# Patient Record
Sex: FEMALE | Race: WHITE | NOT HISPANIC OR LATINO | Employment: OTHER | URBAN - METROPOLITAN AREA
[De-identification: names, ages, dates, MRNs, and addresses within clinical notes are randomized per-mention and may not be internally consistent; named-entity substitution may affect disease eponyms.]

---

## 2017-03-24 ENCOUNTER — TELEPHONE (OUTPATIENT)
Dept: FAMILY MEDICINE | Facility: CLINIC | Age: 77
End: 2017-03-24

## 2017-03-24 NOTE — TELEPHONE ENCOUNTER
Panel Management Review      Patient has the following on her problem list: None      Composite cancer screening  Chart review shows that this patient is due/due soon for the following Mammogram  Summary:    Patient is due/failing the following:   MAMMOGRAM    Action needed:   Patient needs referral/order: mammogram    Type of outreach:    Phone, left message for patient to call back.     Questions for provider review:    None                                                                                                                                    Reina Alvarado MA       Chart routed to Care Team .

## 2017-04-03 ENCOUNTER — RADIANT APPOINTMENT (OUTPATIENT)
Dept: MAMMOGRAPHY | Facility: CLINIC | Age: 77
End: 2017-04-03
Attending: FAMILY MEDICINE
Payer: COMMERCIAL

## 2017-04-03 DIAGNOSIS — Z12.31 VISIT FOR SCREENING MAMMOGRAM: ICD-10-CM

## 2017-04-03 PROCEDURE — G0202 SCR MAMMO BI INCL CAD: HCPCS | Mod: TC

## 2017-07-18 ENCOUNTER — OFFICE VISIT (OUTPATIENT)
Dept: FAMILY MEDICINE | Facility: CLINIC | Age: 77
End: 2017-07-18
Payer: COMMERCIAL

## 2017-07-18 VITALS — DIASTOLIC BLOOD PRESSURE: 82 MMHG | HEART RATE: 72 BPM | SYSTOLIC BLOOD PRESSURE: 134 MMHG

## 2017-07-18 DIAGNOSIS — Z23 NEED FOR SHINGLES VACCINE: ICD-10-CM

## 2017-07-18 DIAGNOSIS — K21.9 GASTROESOPHAGEAL REFLUX DISEASE, ESOPHAGITIS PRESENCE NOT SPECIFIED: ICD-10-CM

## 2017-07-18 DIAGNOSIS — D17.30 LIPOMA OF SKIN AND SUBCUTANEOUS TISSUE: Primary | ICD-10-CM

## 2017-07-18 PROCEDURE — 99213 OFFICE O/P EST LOW 20 MIN: CPT | Performed by: FAMILY MEDICINE

## 2017-07-18 NOTE — PATIENT INSTRUCTIONS
Health Maintenance   Topic Date Due     TETANUS IMMUNIZATION (SYSTEM ASSIGNED)  06/19/1960     DEXA SCAN SCREENING (SYSTEM ASSIGNED)  06/19/2007     LIPID SCREEN Q5 YR FEMALE (SYSTEM ASSIGNED)  02/24/2016     FALL RISK ASSESSMENT  12/03/2016     INFLUENZA VACCINE (SYSTEM ASSIGNED)  09/01/2017     MAMMO SCREEN Q2 YR (SYSTEM ASSIGNED)  04/03/2019     ADVANCE DIRECTIVE PLANNING Q5 YRS  08/07/2019     COLON CANCER SCREEN (SYSTEM ASSIGNED)  12/12/2022     PNEUMOCOCCAL  Completed     Thank you for choosing [facility].  You may be receiving a survey in the mail from [employer] regarding your visit today.  Please take a few minutes to complete and return the survey to let us know how we are doing.  Our Clinic hours are:  Mondays    7:20 am - 7 pm  Tues -  Fri  7:20 am - 5 pm  Clinic Phone: [phone]  The clinic lab opens at 7:30 am Mon - Fri and appointments are required.  [facility]. [phone]  Monday-Thursday 8 am - 7pm  Tues/Wed/Fri 8 am - 5:30 pm

## 2017-07-18 NOTE — NURSING NOTE
"Chief Complaint   Patient presents with     Derm Problem     growth on left shoulder       Initial /82 (BP Location: Right arm, Patient Position: Chair, Cuff Size: Adult Regular)  Pulse 72 Estimated body mass index is 28.58 kg/(m^2) as calculated from the following:    Height as of 12/3/15: 5' 3.75\" (1.619 m).    Weight as of 12/12/16: 165 lb 3.2 oz (74.9 kg).  Medication Reconciliation: complete   Radha Franz cMA    "

## 2017-07-18 NOTE — MR AVS SNAPSHOT
After Visit Summary   7/18/2017    Marlene Millan    MRN: [id number]           Patient Information     Date Of Birth          1940        Visit Information        Provider Department      7/18/2017 3:40 PM Ariella Castañeda MD [facility]        Today's Diagnoses     Lipoma of skin and subcutaneous tissue    -  1    Gastroesophageal reflux disease, esophagitis presence not specified          Care Instructions    Health Maintenance   Topic Date Due     TETANUS IMMUNIZATION (SYSTEM ASSIGNED)  06/19/1960     DEXA SCAN SCREENING (SYSTEM ASSIGNED)  06/19/2007     LIPID SCREEN Q5 YR FEMALE (SYSTEM ASSIGNED)  02/24/2016     FALL RISK ASSESSMENT  12/03/2016     INFLUENZA VACCINE (SYSTEM ASSIGNED)  09/01/2017     MAMMO SCREEN Q2 YR (SYSTEM ASSIGNED)  04/03/2019     ADVANCE DIRECTIVE PLANNING Q5 YRS  08/07/2019     COLON CANCER SCREEN (SYSTEM ASSIGNED)  12/12/2022     PNEUMOCOCCAL  Completed     Thank you for choosing [facility].  You may be receiving a survey in the mail from [facility] regarding your visit today.  Please take a few minutes to complete and return the survey to let us know how we are doing.  Our Clinic hours are:  Mondays    7:20 am - 7 pm  Tues -  Fri  7:20 am - 5 pm  Clinic Phone: [phone]  The clinic lab opens at 7:30 am Mon - Fri and appointments are required.  [facility]  Ph. [phone]  Monday-Thursday 8 am - 7pm  Tues/Wed/Fri 8 am - 5:30 pm             Follow-ups after your visit        Who to contact     If you have questions or need follow up information about today's clinic visit or your schedule please contact [facility] directly at [phone].  Normal or non-critical lab and imaging results will be communicated to you by MyChart, letter or phone within 4 business days after the clinic has received the results. If you do not hear from us within 7 days, please contact the clinic through MyChart or phone.  If you have a critical or abnormal lab result, we will notify you by phone as soon as possible.  Submit refill requests through [employer] or call your pharmacy and they will forward the refill request to us. Please allow 3 business days for your refill to be completed.          Additional Information About Your Visit        Nanoleafhart Information     [employer] gives you secure access to your electronic health record. If you see a primary care provider, you can also send messages to your care team and make appointments. If you have questions, please call your primary care clinic.  If you do not have a primary care provider, please call [phone] and they will assist you.        Care EveryWhere ID     This is your Care EveryWhere ID. This could be used by other organizations to access your [place] medical records  [id number]        Your Vitals Were     Pulse                   72            Blood Pressure from Last 3 Encounters:   07/18/17 134/82   12/12/16 133/78   04/15/16 116/66    Weight from Last 3 Encounters:   12/12/16 165 lb 3.2 oz (74.9 kg)   12/03/15 163 lb 9.6 oz (74.2 kg)   09/25/15 169 lb (76.7 kg)              Today, you had the following     No orders found for display         Today's Medication Changes          These changes are accurate as of: 7/18/17  4:53 PM.  If you have any questions, ask your nurse or doctor.               Start taking these medicines.        Dose/Directions    omeprazole 20 MG CR capsule   Commonly known as:  priLOSEC   Used for:  Gastroesophageal reflux disease, esophagitis presence not specified   Started by:  Ariella Castañeda MD        Dose:  20 mg   Take 1 capsule (20 mg) by mouth daily 1/2 hour prior to main meal   Quantity:  30 capsule   Refills:  1            Where to get your medicines      These medications were sent to [facility] [place], [place] - [address]  [address], [facility] [address]     Phone:  [phone]      omeprazole 20 MG CR capsule                Primary Care Provider Office Phone # Fax #    Ariella Dora Castañeda [phone]       [facility] [address] [address] [facility] [address]        Equal Access to Services     JAYLA EMMANUEL : Hadii leigh morgan hadstuo Soomaali, waaxda luqadaha, qaybta kaalmada adeegyada, waxerika idiin hayelidan ademendez light laElizabethjason nicole. So [facility] [phone].    ATENCIÓN: Si habla español, tiene a aldridge disposición servicios gratuitos de asistencia lingüística. Llame al [phone].    We comply with applicable federal civil rights laws and [place] laws. We do not discriminate on the basis of race, color, national origin, age, disability sex, sexual orientation or gender identity.            Thank you!     Thank you for choosing [facility]  for your care. Our goal is always to provide you with excellent care. Hearing back from our patients is one way we can continue to improve our services. Please take a few minutes to complete the written survey that you may receive in the mail after your visit with us. Thank you!             Your Updated Medication List - Protect others around you: Learn how to safely use, store and throw away your medicines at www.disposemymeds.org.          This list is accurate as of: 7/18/17  4:53 PM.  Always use your most recent med list.                   Brand Name Dispense Instructions for use Diagnosis    ibuprofen 800 MG tablet    ADVIL/MOTRIN    30 tablet    Take 1 tablet (800 mg) by mouth every 8 hours as needed for moderate pain    Herpes zoster without complication       MACULAR VITAMIN BENEFIT PO      Take 1 tablet by mouth daily        MULTIPLE VITAMINS PO      Take by mouth daily Chewable - 2 of them each day        omeprazole 20 MG CR capsule    priLOSEC    30 capsule    Take 1 capsule (20 mg) by mouth daily 1/2 hour prior to main meal    Gastroesophageal reflux disease, esophagitis presence not specified        valACYclovir 1000 mg tablet    VALTREX    21 tablet    Take 1 tablet (1,000 mg) by mouth 3 times daily    Herpes zoster without complication

## 2017-07-18 NOTE — PROGRESS NOTES
SUBJECTIVE:                                                    Marlene Millan is a 77 year old female who presents to clinic today for the following health issues:    Chief Complaint   Patient presents with     Derm Problem     growth on left shoulder     Janee is concerned about a soft tissue nodule on the top of her left shoulder in the midclavicular line. This is nontender; she is uncertain how long it has been present, or whether it has been recently enlarging.    She also notes some recurrent esophageal discomfort after eating, relieved by TUMs, but sometimes quite severe. She has been diagnosed with GERD, was on omeprazole and/or ranitidine in the past, but doesn't like to take medicines so weaned herself off the PPI and the H2 blocker. She does elevate the head of her bed, knows she is supposed to avoid bedtime snacks but feels she does better if she has a bit to eat then.    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    OBJECTIVE: /82 (BP Location: Right arm, Patient Position: Chair, Cuff Size: Adult Regular)  Pulse 72    She has a soft, compressible mass about 4-5 cm in diameter on the top of her left shoulder with indistinct borders, consistent with a benign lipoma.    ASSESSMENT:/PLAN:      ICD-10-CM    1. Lipoma of skin and subcutaneous tissue D17.30    2. Gastroesophageal reflux disease, esophagitis presence not specified K21.9 omeprazole (PRILOSEC) 20 MG CR capsule   3. Need for shingles vaccine Z23 ZOSTER VACC LIVE SUBQ NJX     She will check on insurance coverage for the shingles vaccine; a future order was placed in case she decides to get that here.  She is advised to resume the omeprazole for the next month or two.  If her symptoms resolve, then she may try going back to ranitidine, which would be a safer option for long term use.  But if her symptoms persist despite the PPI, then I would recommend an  EGD.    The lipoma is benign and of cosmetic significance only.  IF and when it bothers her enough to want it removed, she may request a referral to a general surgeon.    Ariella Castañeda md

## 2017-11-16 ENCOUNTER — ALLIED HEALTH/NURSE VISIT (OUTPATIENT)
Dept: FAMILY MEDICINE | Facility: CLINIC | Age: 77
End: 2017-11-16
Payer: COMMERCIAL

## 2017-11-16 DIAGNOSIS — Z23 NEED FOR PROPHYLACTIC VACCINATION AND INOCULATION AGAINST INFLUENZA: Primary | ICD-10-CM

## 2017-11-16 PROCEDURE — 99207 ZZC NO CHARGE NURSE ONLY: CPT

## 2017-11-16 PROCEDURE — 90662 IIV NO PRSV INCREASED AG IM: CPT

## 2017-11-16 PROCEDURE — 90471 IMMUNIZATION ADMIN: CPT

## 2017-11-16 NOTE — PROGRESS NOTES
Injectable Influenza Immunization Documentation    1.  Is the person to be vaccinated sick today?   No    2. Does the person to be vaccinated have an allergy to a component   of the vaccine?   No  Egg Allergy Algorithm Link    3. Has the person to be vaccinated ever had a serious reaction   to influenza vaccine in the past?   No    4. Has the person to be vaccinated ever had Guillain-Barré syndrome?   No    Form completed by Radha Franz CMA  Per orders of Dr. Leos, injection of flu vaccine given by Radha Franz CMA. Patient instructed to remain in clinic for 15 minutes afterwards, and to report any adverse reaction to me immediately.

## 2017-11-16 NOTE — MR AVS SNAPSHOT
After Visit Summary   11/16/2017    Marlene Millan    MRN: [id number]           Patient Information     Date Of Birth          1940        Visit Information        Provider Department      11/16/2017 2:15 PM Cma/Lpn, Fl Cl [facility]        Today's Diagnoses     Need for prophylactic vaccination and inoculation against influenza    -  1       Follow-ups after your visit        Your next 10 appointments already scheduled     Nov 16, 2017  2:15 PM CST   Nurse Only with Fl Cl Cma/Lpn   [facility] ([facility]    [address]  [facility] [address]   [phone]              Who to contact     If you have questions or need follow up information about today's clinic visit or your schedule please contact [facility] directly at [phone].  Normal or non-critical lab and imaging results will be communicated to you by Videolicioushart, letter or phone within 4 business days after the clinic has received the results. If you do not hear from us within 7 days, please contact the clinic through Videolicioushart or phone. If you have a critical or abnormal lab result, we will notify you by phone as soon as possible.  Submit refill requests through [employer] or call your pharmacy and they will forward the refill request to us. Please allow 3 business days for your refill to be completed.          Additional Information About Your Visit        MyChart Information     [employer] gives you secure access to your electronic health record. If you see a primary care provider, you can also send messages to your care team and make appointments. If you have questions, please call your primary care clinic.  If you do not have a primary care provider, please call [phone] and they will assist you.        Care EveryWhere ID     This is your Care EveryWhere ID. This could be used by other organizations to access your [facility]  records  [id number]         Blood Pressure from Last 3 Encounters:   07/18/17 134/82   12/12/16 133/78   04/15/16 116/66    Weight from Last 3 Encounters:   12/12/16 165 lb 3.2 oz (74.9 kg)   12/03/15 163 lb 9.6 oz (74.2 kg)   09/25/15 169 lb (76.7 kg)              We Performed the Following     FLU VACCINE, INCREASED ANTIGEN, PRESV FREE, AGE 65+ [65926]     Vaccine Administration, Initial [06250]        Primary Care Provider Office Phone # Fax #    Ariella Dora Castañeda [phone] [address] [facility] [address]        Equal Access to Services     JAYLA BENITEZ : Hadii leigh del rosarioo Soalbertina, waaxda luqadaha, qaybta kaalmada adeegyada, loni nicole. So [facility] [phone].    ATENCIÓN: Si habla español, tiene a aldridge disposición servicios gratuitos de asistencia lingüística. Llame al [phone].    We comply with applicable federal civil rights laws and [place] laws. We do not discriminate on the basis of race, color, national origin, age, disability, sex, sexual orientation, or gender identity.            Thank you!     Thank you for choosing [facility]  for your care. Our goal is always to provide you with excellent care. Hearing back from our patients is one way we can continue to improve our services. Please take a few minutes to complete the written survey that you may receive in the mail after your visit with us. Thank you!             Your Updated Medication List - Protect others around you: Learn how to safely use, store and throw away your medicines at www.disposemymeds.org.          This list is accurate as of: 11/16/17  2:06 PM.  Always use your most recent med list.                   Brand Name Dispense Instructions for use Diagnosis    ibuprofen 800 MG tablet    ADVIL/MOTRIN    30 tablet    Take 1 tablet (800 mg) by mouth every 8 hours as needed for moderate pain    Herpes zoster without complication       MACULAR VITAMIN  BENEFIT PO      Take 1 tablet by mouth daily        MULTIPLE VITAMINS PO      Take by mouth daily Chewable - 2 of them each day        omeprazole 20 MG CR capsule    priLOSEC    30 capsule    Take 1 capsule (20 mg) by mouth daily 1/2 hour prior to main meal    Gastroesophageal reflux disease, esophagitis presence not specified       valACYclovir 1000 mg tablet    VALTREX    21 tablet    Take 1 tablet (1,000 mg) by mouth 3 times daily    Herpes zoster without complication

## 2017-11-30 ENCOUNTER — TRANSFERRED RECORDS (OUTPATIENT)
Dept: HEALTH INFORMATION MANAGEMENT | Facility: CLINIC | Age: 77
End: 2017-11-30

## 2017-12-13 ENCOUNTER — OFFICE VISIT (OUTPATIENT)
Dept: FAMILY MEDICINE | Facility: CLINIC | Age: 77
End: 2017-12-13
Payer: COMMERCIAL

## 2017-12-13 VITALS
BODY MASS INDEX: 28.31 KG/M2 | DIASTOLIC BLOOD PRESSURE: 84 MMHG | TEMPERATURE: 98 F | SYSTOLIC BLOOD PRESSURE: 155 MMHG | HEIGHT: 64 IN | HEART RATE: 60 BPM | WEIGHT: 165.8 LBS

## 2017-12-13 DIAGNOSIS — Z23 NEED FOR VACCINATION: ICD-10-CM

## 2017-12-13 DIAGNOSIS — E78.5 HYPERLIPIDEMIA LDL GOAL <130: ICD-10-CM

## 2017-12-13 DIAGNOSIS — Z01.818 PREOP GENERAL PHYSICAL EXAM: Primary | ICD-10-CM

## 2017-12-13 DIAGNOSIS — H25.9 SENILE CATARACT OF RIGHT EYE, UNSPECIFIED AGE-RELATED CATARACT TYPE: ICD-10-CM

## 2017-12-13 LAB
CHOLEST SERPL-MCNC: 242 MG/DL
HDLC SERPL-MCNC: 54 MG/DL
LDLC SERPL CALC-MCNC: 156 MG/DL
NONHDLC SERPL-MCNC: 188 MG/DL
TRIGL SERPL-MCNC: 161 MG/DL

## 2017-12-13 PROCEDURE — 99214 OFFICE O/P EST MOD 30 MIN: CPT | Mod: 25 | Performed by: FAMILY MEDICINE

## 2017-12-13 PROCEDURE — 36415 COLL VENOUS BLD VENIPUNCTURE: CPT | Performed by: FAMILY MEDICINE

## 2017-12-13 PROCEDURE — 80061 LIPID PANEL: CPT | Performed by: FAMILY MEDICINE

## 2017-12-13 PROCEDURE — 90715 TDAP VACCINE 7 YRS/> IM: CPT | Performed by: FAMILY MEDICINE

## 2017-12-13 PROCEDURE — 90471 IMMUNIZATION ADMIN: CPT | Performed by: FAMILY MEDICINE

## 2017-12-13 ASSESSMENT — PAIN SCALES - GENERAL: PAINLEVEL: NO PAIN (0)

## 2017-12-13 NOTE — PROGRESS NOTES
[facility]  [address]  [facility] [address]  [phone]  Dept: [phone]    PRE-OP EVALUATION:  Today's date: 2017    Marlene Millan (: 1940) presents for pre-operative evaluation assessment as requested by Dr. Michel.  She requires evaluation and anesthesia risk assessment prior to undergoing surgery/procedure for treatment of cataract .  Proposed procedure: Right Cataract Removal with Implant    Date of Surgery/ Procedure: 17  Time of Surgery/ Procedure: ?  Hospital/Surgical Facility: [facility]    Primary Physician: Ariella Castañeda  Type of Anesthesia Anticipated: Combined MAC with Retrobulbar    Patient has a Health Care Directive or Living Will:  NO    1. NO - Do you have a history of heart attack, stroke, stent, bypass or surgery on an artery in the head, neck, heart or legs?  2. NO - Do you ever have any pain or discomfort in your chest?  3. NO - Do you have a history of  Heart Failure?  4. NO - Are you troubled by shortness of breath when: walking on the level, up a slight hill or at night?  5. NO - Do you currently have a cold, bronchitis or other respiratory infection?  6. NO - Do you have a cough, shortness of breath or wheezing?  7. NO - Do you sometimes get pains in the calves of your legs when you walk?  8. YES - Do you or anyone in your family have previous history of blood clots? Personal hx of blood clots  9. NO - Do you or does anyone in your family have a serious bleeding problem such as prolonged bleeding following surgeries or cuts?  10. NO - Have you ever had problems with anemia or been told to take iron pills?  11. NO - Have you had any abnormal blood loss such as black, tarry or bloody stools, or abnormal vaginal bleeding?  12. NO - Have you ever had a blood transfusion?  13. YES - Have you or any of your relatives ever had problems with anesthesia?  14. NO - Do you have sleep apnea, excessive snoring or  daytime drowsiness?  15. NO - Do you have any prosthetic heart valves?  16. YES - Do you have prosthetic joints?  Bilateral knee replacements  17. NO - Is there any chance that you may be pregnant?        HPI:                                                      Brief HPI related to upcoming procedure: Symptomatic cataracts.        See problem list for active medical problems.  Problems all longstanding and stable, except as noted/documented.  See ROS for pertinent symptoms related to these conditions.                                                                                                  .  HYPERLIPIDEMIA - Patient has a long history of significant Hyperlipidemia requiring medication for treatment with recent unable to assess control. Has no checked lipids since 2011.                                   .    MEDICAL HISTORY:                                                    Patient Active Problem List    Diagnosis Date Noted     Health Care Home 12/12/2012     Priority: Medium     Jazmin Morgan RN-PHN  FPA / ASH[facility]   [phone]    DX V65.8 REPLACED WITH 77454 HEALTH CARE HOME (04/08/2013)       Advanced directives, counseling/discussion 06/11/2012     Priority: Medium     Received 2007         Patent foramen ovale 02/27/2012     Priority: Medium     Incidental finding on transesophageal echocardiogram       GERD (gastroesophageal reflux disease) 02/25/2011     Priority: Medium     Controlled by diet       HYPERLIPIDEMIA LDL GOAL <130 10/31/2010     Priority: Medium     Macular degeneration 07/14/2008     Priority: Medium     Left eye       Spinal Stenosis of Lumbar Region 07/14/2008     Priority: Medium      Past Medical History:   Diagnosis Date     Hypertension 6/7/2010     Macular degeneration      PONV (postoperative nausea and vomiting)      Pulmonary embolism (H)      Pulmonary embolism, bilateral (H) 2/19/2012    Post-surgical at [facility] following total knee  replacement      Shingles outbreak December 2016     Past Surgical History:   Procedure Laterality Date     basal cell cancer of nose  Oct 2012     BREAST SURGERY      breast reduction     CARPAL TUNNEL RELEASE RT/LT      right     COMBINED REPAIR PTOSIS WITH BLEPHAROPLASTY BILATERAL Bilateral 6/30/2015    Procedure: COMBINED REPAIR PTOSIS WITH BLEPHAROPLASTY BILATERAL;  Surgeon: Ilir Marvin MD;  Location:  [facility]     HC REMOVAL GALLBLADDER       LIGATE VEIN VARICOSE, PHLEBECTOMY MULTIPLE STAB, COMBINED Bilateral 8/13/2015    Procedure: COMBINED LIGATE VEIN VARICOSE, PHLEBECTOMY MULTIPLE STAB;  Surgeon: Alphonse Pro MD;  Location: [facility] OR     ORTHOPEDIC SURGERY      bilateral knee     SURGICAL HISTORY OF -       breast reduction mammoplasty 1990s     SURGICAL HISTORY OF -       bilateral lower extremity vein stripping     SURGICAL HISTORY OF -   2/22/2010    left total knee arthroplasty     Current Outpatient Prescriptions   Medication Sig Dispense Refill     ibuprofen (ADVIL/MOTRIN) 800 MG tablet Take 1 tablet (800 mg) by mouth every 8 hours as needed for moderate pain 30 tablet 1     Multiple Vitamins-Minerals (MACULAR VITAMIN BENEFIT PO) Take 1 tablet by mouth daily       MULTIPLE VITAMINS PO Take by mouth daily Chewable - 2 of them each day       omeprazole (PRILOSEC) 20 MG CR capsule Take 1 capsule (20 mg) by mouth daily 1/2 hour prior to main meal (Patient not taking: Reported on 12/13/2017) 30 capsule 1     valACYclovir (VALTREX) 1000 mg tablet Take 1 tablet (1,000 mg) by mouth 3 times daily (Patient not taking: Reported on 12/13/2017) 21 tablet 0     OTC products: None, except as noted above    Allergies   Allergen Reactions     Sulfa Drugs      Affected eyesight      Latex Allergy: NO    Social History   Substance Use Topics     Smoking status: Former Smoker     Years: 20.00     Types: Cigarettes     Quit date: 2/13/1982     Smokeless tobacco: Never Used     Alcohol use Yes      Comment: occ  "wine     History   Drug Use No       REVIEW OF SYSTEMS:                                                    Constitutional, neuro, ENT, endocrine, pulmonary, cardiac, gastrointestinal, genitourinary, musculoskeletal, integument and psychiatric systems are negative, except as otherwise noted.      EXAM:                                                    /84 (BP Location: Right arm, Cuff Size: Adult Regular)  Pulse 60  Temp 98  F (36.7  C) (Oral)  Ht 5' 3.5\" (1.613 m)  Wt 165 lb 12.8 oz (75.2 kg)  Breastfeeding? No  BMI 28.91 kg/m2    GENERAL APPEARANCE: healthy, alert and no distress     EYES: EOMI, PERRL     HENT: ear canals and TM's normal and nose and mouth without ulcers or lesions     NECK: no adenopathy, no asymmetry, masses, or scars and thyroid normal to palpation     RESP: lungs clear to auscultation - no rales, rhonchi or wheezes     CV: regular rates and rhythm, normal S1 S2, no S3 or S4 and no murmur, click or rub     ABDOMEN:  soft, nontender, no HSM or masses and bowel sounds normal     MS: extremities normal- no gross deformities noted, no evidence of inflammation in joints, FROM in all extremities.     SKIN: no suspicious lesions or rashes     NEURO: Normal strength and tone, sensory exam grossly normal, mentation intact and speech normal     PSYCH: mentation appears normal. and affect normal/bright     LYMPHATICS: No axillary, cervical, or supraclavicular nodes    DIAGNOSTICS:                                                    No labs or EKG required for low risk surgery (cataract, skin procedure, breast biopsy, etc)    Recent Labs   Lab Test  08/06/15   1706 07/13/12 06/15/12   02/08/12   1028   HGB  12.4   --    --    --   13.0   PLT  254   --    --    --   259   INR   --   1.6*  1.6*   < >  0.96   NA  138   --    --    --   141   POTASSIUM  4.1   --    --    --   4.3   CR  0.80   --    --    --   0.79    < > = values in this interval not displayed.        IMPRESSION:                     "                                Reason for surgery/procedure: cataract  Diagnosis/reason for consult: Preoperative H&P     The proposed surgical procedure is considered LOW risk.    REVISED CARDIAC RISK INDEX  The patient has the following serious cardiovascular risks for perioperative complications such as (MI, PE, VFib and 3  AV Block):  No serious cardiac risks  INTERPRETATION: 0 risks: Class I (very low risk - 0.4% complication rate)    The patient has the following additional risks for perioperative complications:  No identified additional risks      ICD-10-CM    1. Preop general physical exam Z01.818    2. Senile cataract of right eye, unspecified age-related cataract type H25.9    3. Hyperlipidemia LDL goal <130 E78.5 Lipid Profile (Chol, Trig, HDL, LDL calc)   4. Need for vaccination Z23 TDAP VACCINE (ADACEL) [74168.002]     1st  Administration  [73534]       RECOMMENDATIONS:                                                          --Patient is to take all scheduled medications on the day of surgery EXCEPT for modifications listed below.    APPROVAL GIVEN to proceed with proposed procedure, without further diagnostic evaluation       Signed Electronically by: Katy Rodriguez MD    Copy of this evaluation report is provided to requesting physician.    [facility] Preop Guidelines

## 2017-12-13 NOTE — MR AVS SNAPSHOT
After Visit Summary   12/13/2017    Marlene Millan    MRN: [id number]           Patient Information     Date Of Birth          1940        Visit Information        Provider Department      12/13/2017 8:00 AM Katy Rodriguez MD [facility]        Today's Diagnoses     Preop general physical exam    -  1    Senile cataract of right eye, unspecified age-related cataract type        Hyperlipidemia LDL goal <130        Need for vaccination          Care Instructions      Before Your Surgery      Call your surgeon if there is any change in your health. This includes signs of a cold or flu (such as a sore throat, runny nose, cough, rash or fever).    Do not smoke, drink alcohol or take over the counter medicine (unless your surgeon or primary care doctor tells you to) for the 24 hours before and after surgery.    If you take prescribed drugs: Follow your doctor s orders about which medicines to take and which to stop until after surgery.    Eating and drinking prior to surgery: follow the instructions from your surgeon    Take a shower or bath the night before surgery. Use the soap your surgeon gave you to gently clean your skin. If you do not have soap from your surgeon, use your regular soap. Do not shave or scrub the surgery site.  Wear clean pajamas and have clean sheets on your bed.           Follow-ups after your visit        Your next 10 appointments already scheduled     Dec 21, 2017   Procedure with Clif Michel MD   [facility] (--)    [address] [address] [phone]           The medical center is located at [address]. (between [address] in [place], [place] miles north of [place]).              Who to contact     If you have questions or need follow up information about today's clinic visit or your schedule please contact [facility] directly at [phone].  Normal or  "non-critical lab and imaging results will be communicated to you by MyChart, letter or phone within 4 business days after the clinic has received the results. If you do not hear from us within 7 days, please contact the clinic through [facility] or phone. If you have a critical or abnormal lab result, we will notify you by phone as soon as possible.  Submit refill requests through [employer] or call your pharmacy and they will forward the refill request to us. Please allow 3 business days for your refill to be completed.          Additional Information About Your Visit        Tre[employer]of[employer] Hit Information     Us[employer] gives you secure access to your electronic health record. If you see a primary care provider, you can also send messages to your care team and make appointments. If you have questions, please call your primary care clinic.  If you do not have a primary care provider, please call [phone] and they will assist you.        Care EveryWhere ID     This is your Care EveryWhere ID. This could be used by other organizations to access your [facility] medical records  [id number]        Your Vitals Were     Pulse Temperature Height Breastfeeding? BMI (Body Mass Index)       60 98  F (36.7  C) (Oral) 5' 3.5\" (1.613 m) No 28.91 kg/m2        Blood Pressure from Last 3 Encounters:   12/13/17 155/84   07/18/17 134/82   12/12/16 133/78    Weight from Last 3 Encounters:   12/13/17 165 lb 12.8 oz (75.2 kg)   12/12/16 165 lb 3.2 oz (74.9 kg)   12/03/15 163 lb 9.6 oz (74.2 kg)              We Performed the Following     1st  Administration  [45861]     Lipid Profile (Chol, Trig, HDL, LDL calc)     TDAP VACCINE (ADACEL) [84089.002]        Primary Care Provider Office Phone # Fax #    Ariella Dora Castañeda [phone] [address] [facility] [address]        Equal Access to Services     JAYLA BENITEZ AH: Hadii leigh morgan hadasho Soomaali, waaxda luqadaha, qaybta kaalmaaleisha caban, loni light " laallie nicole. So [facility] [phone].    ATENCIÓN: Si habla daniela, tiene a aldridge disposición servicios gratuitos de asistencia lingüística. Radha segundo [phone].    We comply with applicable federal civil rights laws and [place] laws. We do not discriminate on the basis of race, color, national origin, age, disability, sex, sexual orientation, or gender identity.            Thank you!     Thank you for choosing [facility]  for your care. Our goal is always to provide you with excellent care. Hearing back from our patients is one way we can continue to improve our services. Please take a few minutes to complete the written survey that you may receive in the mail after your visit with us. Thank you!             Your Updated Medication List - Protect others around you: Learn how to safely use, store and throw away your medicines at www.disposemymeds.org.          This list is accurate as of: 12/13/17  9:25 AM.  Always use your most recent med list.                   Brand Name Dispense Instructions for use Diagnosis    ibuprofen 800 MG tablet    ADVIL/MOTRIN    30 tablet    Take 1 tablet (800 mg) by mouth every 8 hours as needed for moderate pain    Herpes zoster without complication       MACULAR VITAMIN BENEFIT PO      Take 1 tablet by mouth daily        MULTIPLE VITAMINS PO      Take by mouth daily Chewable - 2 of them each day        omeprazole 20 MG CR capsule    priLOSEC    30 capsule    Take 1 capsule (20 mg) by mouth daily 1/2 hour prior to main meal    Gastroesophageal reflux disease, esophagitis presence not specified       valACYclovir 1000 mg tablet    VALTREX    21 tablet    Take 1 tablet (1,000 mg) by mouth 3 times daily    Herpes zoster without complication

## 2017-12-13 NOTE — NURSING NOTE
"Chief Complaint   Patient presents with     Pre-Op Exam     Imm/Inj     adacel       Initial /84 (BP Location: Right arm, Cuff Size: Adult Regular)  Pulse 60  Temp 98  F (36.7  C) (Oral)  Ht 5' 3.5\" (1.613 m)  Wt 165 lb 12.8 oz (75.2 kg)  Breastfeeding? No  BMI 28.91 kg/m2 Estimated body mass index is 28.91 kg/(m^2) as calculated from the following:    Height as of this encounter: 5' 3.5\" (1.613 m).    Weight as of this encounter: 165 lb 12.8 oz (75.2 kg).  Medication Reconciliation: complete    "

## 2017-12-14 DIAGNOSIS — K21.9 GASTROESOPHAGEAL REFLUX DISEASE, ESOPHAGITIS PRESENCE NOT SPECIFIED: ICD-10-CM

## 2017-12-14 DIAGNOSIS — E78.5 HYPERLIPIDEMIA LDL GOAL <130: Primary | ICD-10-CM

## 2017-12-14 PROBLEM — Q21.10 ASD (ATRIAL SEPTAL DEFECT): Status: ACTIVE | Noted: 2017-12-14

## 2017-12-14 RX ORDER — ATORVASTATIN CALCIUM 40 MG/1
40 TABLET, FILM COATED ORAL DAILY
Qty: 90 TABLET | Refills: 3 | Status: SHIPPED | OUTPATIENT
Start: 2017-12-14 | End: 2018-12-27

## 2017-12-14 NOTE — PROGRESS NOTES
Notified via MyChart: The 10-year ASCVD risk score (Claudine SCHMIDT Jr, et al., 2013) is: 22.8%    Values used to calculate the score:      Age: 77 years      Sex: Female      Is Non- : No      Diabetic: No      Tobacco smoker: No      Systolic Blood Pressure: 155 mmHg      Is BP treated: No      HDL Cholesterol: 54 mg/dL      Total Cholesterol: 242 mg/dL

## 2017-12-19 ENCOUNTER — ANESTHESIA EVENT (OUTPATIENT)
Dept: SURGERY | Facility: CLINIC | Age: 77
End: 2017-12-19
Payer: COMMERCIAL

## 2017-12-19 NOTE — ANESTHESIA PREPROCEDURE EVALUATION
Anesthesia Evaluation     . Pt has had prior anesthetic.     History of anesthetic complications   - PONV  severe PONV       ROS/MED HX    ENT/Pulmonary:     (+), recent URI unresolved nasal drainage : . Other pulmonary disease PE.    Neurologic:  - neg neurologic ROS     Cardiovascular: Comment: PFO    (+) Dyslipidemia, hypertension----. : . . . :. . Atrial Septal defect:,       METS/Exercise Tolerance:  >4 METS   Hematologic:  - neg hematologic  ROS       Musculoskeletal:   (+) , , other musculoskeletal- Spinal stenosis      GI/Hepatic:     (+) GERD Asymptomatic on medication,       Renal/Genitourinary:  - ROS Renal section negative       Endo:  - neg endo ROS       Psychiatric:  - neg psychiatric ROS       Infectious Disease:  - neg infectious disease ROS       Malignancy:      - no malignancy   Other:    - neg other ROS                 Physical Exam  Normal systems: cardiovascular and pulmonary    Airway   Mallampati: II  TM distance: >3 FB  Neck ROM: full    Dental   (+) missing    Cardiovascular       Pulmonary                     Anesthesia Plan      History & Physical Review  History and physical reviewed and following examination; no interval change.    ASA Status:  2 .    NPO Status:  > 8 hours    Plan for MAC with Propofol induction.   PONV prophylaxis:  Ondansetron (or other 5HT-3) and Dexamethasone or Solumedrol       Postoperative Care  Postoperative pain management:  IV analgesics and Oral pain medications.      Consents  Anesthetic plan, risks, benefits and alternatives discussed with:  Patient and Daughter/Son..                          .

## 2017-12-21 ENCOUNTER — HOSPITAL ENCOUNTER (OUTPATIENT)
Facility: CLINIC | Age: 77
Discharge: HOME | End: 2017-12-21
Attending: OPHTHALMOLOGY | Admitting: OPHTHALMOLOGY
Payer: COMMERCIAL

## 2017-12-21 ENCOUNTER — ANESTHESIA (OUTPATIENT)
Dept: SURGERY | Facility: CLINIC | Age: 77
End: 2017-12-21
Payer: COMMERCIAL

## 2017-12-21 ENCOUNTER — SURGERY (OUTPATIENT)
Age: 77
End: 2017-12-21

## 2017-12-21 VITALS
TEMPERATURE: 97.8 F | HEART RATE: 68 BPM | HEIGHT: 64 IN | OXYGEN SATURATION: 98 % | RESPIRATION RATE: 16 BRPM | DIASTOLIC BLOOD PRESSURE: 85 MMHG | BODY MASS INDEX: 27.83 KG/M2 | WEIGHT: 163 LBS | SYSTOLIC BLOOD PRESSURE: 153 MMHG

## 2017-12-21 PROCEDURE — 36000025 ZZH CATARACT SURGICAL PACKAGE: Performed by: OPHTHALMOLOGY

## 2017-12-21 PROCEDURE — 71000022 ZZH RECOVERY CATRACT PACKAGE: Performed by: OPHTHALMOLOGY

## 2017-12-21 PROCEDURE — 25000125 ZZHC RX 250: Performed by: NURSE ANESTHETIST, CERTIFIED REGISTERED

## 2017-12-21 PROCEDURE — 37000012 ZZH ANESTHESIA CATARACT PACKAGE: Performed by: OPHTHALMOLOGY

## 2017-12-21 PROCEDURE — 25000128 H RX IP 250 OP 636: Performed by: NURSE ANESTHETIST, CERTIFIED REGISTERED

## 2017-12-21 PROCEDURE — 25000132 ZZH RX MED GY IP 250 OP 250 PS 637: Performed by: OPHTHALMOLOGY

## 2017-12-21 PROCEDURE — 25000125 ZZHC RX 250: Performed by: OPHTHALMOLOGY

## 2017-12-21 PROCEDURE — V2632 POST CHMBR INTRAOCULAR LENS: HCPCS | Performed by: OPHTHALMOLOGY

## 2017-12-21 PROCEDURE — 25000128 H RX IP 250 OP 636: Performed by: OPHTHALMOLOGY

## 2017-12-21 DEVICE — IMPLANTABLE DEVICE: Type: IMPLANTABLE DEVICE | Site: EYE | Status: FUNCTIONAL

## 2017-12-21 RX ORDER — ONDANSETRON 4 MG/1
4 TABLET, ORALLY DISINTEGRATING ORAL EVERY 30 MIN PRN
Status: DISCONTINUED | OUTPATIENT
Start: 2017-12-21 | End: 2017-12-21 | Stop reason: HOSPADM

## 2017-12-21 RX ORDER — SODIUM CHLORIDE, SODIUM LACTATE, POTASSIUM CHLORIDE, CALCIUM CHLORIDE 600; 310; 30; 20 MG/100ML; MG/100ML; MG/100ML; MG/100ML
INJECTION, SOLUTION INTRAVENOUS CONTINUOUS
Status: DISCONTINUED | OUTPATIENT
Start: 2017-12-21 | End: 2017-12-21 | Stop reason: HOSPADM

## 2017-12-21 RX ORDER — PHENYLEPHRINE HYDROCHLORIDE 100 MG/ML
SOLUTION/ DROPS OPHTHALMIC PRN
Status: DISCONTINUED | OUTPATIENT
Start: 2017-12-21 | End: 2017-12-21 | Stop reason: HOSPADM

## 2017-12-21 RX ORDER — FLURBIPROFEN SODIUM 0.3 MG/ML
1 SOLUTION/ DROPS OPHTHALMIC
Status: COMPLETED | OUTPATIENT
Start: 2017-12-21 | End: 2017-12-21

## 2017-12-21 RX ORDER — MEPERIDINE HYDROCHLORIDE 25 MG/ML
12.5 INJECTION INTRAMUSCULAR; INTRAVENOUS; SUBCUTANEOUS
Status: DISCONTINUED | OUTPATIENT
Start: 2017-12-21 | End: 2017-12-21 | Stop reason: HOSPADM

## 2017-12-21 RX ORDER — FENTANYL CITRATE 50 UG/ML
25-50 INJECTION, SOLUTION INTRAMUSCULAR; INTRAVENOUS
Status: DISCONTINUED | OUTPATIENT
Start: 2017-12-21 | End: 2017-12-21 | Stop reason: HOSPADM

## 2017-12-21 RX ORDER — ONDANSETRON 2 MG/ML
4 INJECTION INTRAMUSCULAR; INTRAVENOUS EVERY 30 MIN PRN
Status: DISCONTINUED | OUTPATIENT
Start: 2017-12-21 | End: 2017-12-21 | Stop reason: HOSPADM

## 2017-12-21 RX ORDER — DEXAMETHASONE SODIUM PHOSPHATE 4 MG/ML
4 INJECTION, SOLUTION INTRA-ARTICULAR; INTRALESIONAL; INTRAMUSCULAR; INTRAVENOUS; SOFT TISSUE EVERY 10 MIN PRN
Status: DISCONTINUED | OUTPATIENT
Start: 2017-12-21 | End: 2017-12-21 | Stop reason: HOSPADM

## 2017-12-21 RX ORDER — NALOXONE HYDROCHLORIDE 0.4 MG/ML
.1-.4 INJECTION, SOLUTION INTRAMUSCULAR; INTRAVENOUS; SUBCUTANEOUS
Status: DISCONTINUED | OUTPATIENT
Start: 2017-12-21 | End: 2017-12-21 | Stop reason: HOSPADM

## 2017-12-21 RX ORDER — NEOMYCIN SULFATE, POLYMYXIN B SULFATE, AND DEXAMETHASONE 3.5; 10000; 1 MG/G; [USP'U]/G; MG/G
OINTMENT OPHTHALMIC PRN
Status: DISCONTINUED | OUTPATIENT
Start: 2017-12-21 | End: 2017-12-21 | Stop reason: HOSPADM

## 2017-12-21 RX ORDER — PROPOFOL 10 MG/ML
INJECTION, EMULSION INTRAVENOUS PRN
Status: DISCONTINUED | OUTPATIENT
Start: 2017-12-21 | End: 2017-12-21

## 2017-12-21 RX ORDER — HYDROXYZINE HYDROCHLORIDE 25 MG/1
25 TABLET, FILM COATED ORAL EVERY 6 HOURS PRN
Status: DISCONTINUED | OUTPATIENT
Start: 2017-12-21 | End: 2017-12-21 | Stop reason: HOSPADM

## 2017-12-21 RX ORDER — ONDANSETRON 2 MG/ML
INJECTION INTRAMUSCULAR; INTRAVENOUS PRN
Status: DISCONTINUED | OUTPATIENT
Start: 2017-12-21 | End: 2017-12-21

## 2017-12-21 RX ORDER — LIDOCAINE HYDROCHLORIDE 10 MG/ML
INJECTION, SOLUTION EPIDURAL; INFILTRATION; INTRACAUDAL; PERINEURAL PRN
Status: DISCONTINUED | OUTPATIENT
Start: 2017-12-21 | End: 2017-12-21

## 2017-12-21 RX ORDER — TIMOLOL MALEATE 5 MG/ML
SOLUTION/ DROPS OPHTHALMIC PRN
Status: DISCONTINUED | OUTPATIENT
Start: 2017-12-21 | End: 2017-12-21 | Stop reason: HOSPADM

## 2017-12-21 RX ORDER — SCOLOPAMINE TRANSDERMAL SYSTEM 1 MG/1
1 PATCH, EXTENDED RELEASE TRANSDERMAL
Status: DISCONTINUED | OUTPATIENT
Start: 2017-12-21 | End: 2017-12-21 | Stop reason: HOSPADM

## 2017-12-21 RX ORDER — LIDOCAINE 40 MG/G
CREAM TOPICAL
Status: DISCONTINUED | OUTPATIENT
Start: 2017-12-21 | End: 2017-12-21 | Stop reason: HOSPADM

## 2017-12-21 RX ORDER — METOPROLOL TARTRATE 1 MG/ML
1-2 INJECTION, SOLUTION INTRAVENOUS EVERY 5 MIN PRN
Status: DISCONTINUED | OUTPATIENT
Start: 2017-12-21 | End: 2017-12-21 | Stop reason: HOSPADM

## 2017-12-21 RX ORDER — DEXAMETHASONE SODIUM PHOSPHATE 4 MG/ML
INJECTION, SOLUTION INTRA-ARTICULAR; INTRALESIONAL; INTRAMUSCULAR; INTRAVENOUS; SOFT TISSUE PRN
Status: DISCONTINUED | OUTPATIENT
Start: 2017-12-21 | End: 2017-12-21

## 2017-12-21 RX ORDER — IBUPROFEN 400 MG/1
400 TABLET, FILM COATED ORAL ONCE
Status: COMPLETED | OUTPATIENT
Start: 2017-12-21 | End: 2017-12-21

## 2017-12-21 RX ORDER — ALBUTEROL SULFATE 0.83 MG/ML
2.5 SOLUTION RESPIRATORY (INHALATION) EVERY 4 HOURS PRN
Status: DISCONTINUED | OUTPATIENT
Start: 2017-12-21 | End: 2017-12-21 | Stop reason: HOSPADM

## 2017-12-21 RX ORDER — PROPARACAINE HYDROCHLORIDE 5 MG/ML
SOLUTION/ DROPS OPHTHALMIC PRN
Status: DISCONTINUED | OUTPATIENT
Start: 2017-12-21 | End: 2017-12-21 | Stop reason: HOSPADM

## 2017-12-21 RX ADMIN — FLURBIPROFEN SODIUM 1 DROP: 0.3 SOLUTION/ DROPS OPHTHALMIC at 08:10

## 2017-12-21 RX ADMIN — CYCLOPENTOLATE HYDROCHLORIDE AND PHENYLEPHRINE HYDROCHLORIDE 1 DROP: 2; 10 SOLUTION/ DROPS OPHTHALMIC at 08:10

## 2017-12-21 RX ADMIN — TIMOLOL MALEATE 1 DROP: 5 SOLUTION OPHTHALMIC at 09:37

## 2017-12-21 RX ADMIN — CYCLOPENTOLATE HYDROCHLORIDE AND PHENYLEPHRINE HYDROCHLORIDE 1 DROP: 2; 10 SOLUTION/ DROPS OPHTHALMIC at 07:55

## 2017-12-21 RX ADMIN — PHENYLEPHRINE HYDROCHLORIDE 2 DROP: 100 SOLUTION/ DROPS OPHTHALMIC at 09:18

## 2017-12-21 RX ADMIN — SODIUM CHLORIDE, POTASSIUM CHLORIDE, SODIUM LACTATE AND CALCIUM CHLORIDE: 600; 310; 30; 20 INJECTION, SOLUTION INTRAVENOUS at 08:03

## 2017-12-21 RX ADMIN — DEXAMETHASONE SODIUM PHOSPHATE 4 MG: 4 INJECTION, SOLUTION INTRA-ARTICULAR; INTRALESIONAL; INTRAMUSCULAR; INTRAVENOUS; SOFT TISSUE at 09:20

## 2017-12-21 RX ADMIN — LIDOCAINE HYDROCHLORIDE 5 ML: 10 INJECTION, SOLUTION EPIDURAL; INFILTRATION; INTRACAUDAL; PERINEURAL at 09:13

## 2017-12-21 RX ADMIN — SODIUM CHONDROITIN SULFATE / SODIUM HYALURONATE 1.05 ML: 0.55-0.5 INJECTION INTRAOCULAR at 09:37

## 2017-12-21 RX ADMIN — CYCLOPENTOLATE HYDROCHLORIDE AND PHENYLEPHRINE HYDROCHLORIDE 1 DROP: 2; 10 SOLUTION/ DROPS OPHTHALMIC at 08:02

## 2017-12-21 RX ADMIN — LIDOCAINE HYDROCHLORIDE 1 ML: 10 INJECTION, SOLUTION EPIDURAL; INFILTRATION; INTRACAUDAL; PERINEURAL at 08:14

## 2017-12-21 RX ADMIN — IBUPROFEN 400 MG: 400 TABLET ORAL at 07:50

## 2017-12-21 RX ADMIN — ONDANSETRON 4 MG: 2 INJECTION INTRAMUSCULAR; INTRAVENOUS at 09:20

## 2017-12-21 RX ADMIN — PROPARACAINE HYDROCHLORIDE 2 DROP: 5 SOLUTION/ DROPS OPHTHALMIC at 09:18

## 2017-12-21 RX ADMIN — FLURBIPROFEN SODIUM 1 DROP: 0.3 SOLUTION/ DROPS OPHTHALMIC at 08:02

## 2017-12-21 RX ADMIN — LIDOCAINE HYDROCHLORIDE: 20 INJECTION, SOLUTION EPIDURAL; INFILTRATION; INTRACAUDAL; PERINEURAL at 09:17

## 2017-12-21 RX ADMIN — PROPOFOL 30 MG: 10 INJECTION, EMULSION INTRAVENOUS at 09:15

## 2017-12-21 RX ADMIN — VANCOMYCIN HYDROCHLORIDE 100 ML: 500 INJECTION, POWDER, LYOPHILIZED, FOR SOLUTION INTRAVENOUS at 09:36

## 2017-12-21 RX ADMIN — FLURBIPROFEN SODIUM 1 DROP: 0.3 SOLUTION/ DROPS OPHTHALMIC at 07:56

## 2017-12-21 RX ADMIN — NEOMYCIN SULFATE, POLYMYXIN B SULFATE, AND DEXAMETHASONE 1 TUBE: 3.5; 10000; 1 OINTMENT OPHTHALMIC at 09:37

## 2017-12-21 RX ADMIN — PROPOFOL 30 MG: 10 INJECTION, EMULSION INTRAVENOUS at 09:13

## 2017-12-21 NOTE — ANESTHESIA CARE TRANSFER NOTE
Patient: Marlene Millan    Procedure(s):  Right Cataract Removal with Implant - Wound Class: I-Clean    Diagnosis: cataract  Diagnosis Additional Information: No value filed.    Anesthesia Type:   No value filed.     Note:  Airway :Room Air  Patient transferred to:Phase II  Handoff Report: Identifed the Patient, Identified the Reponsible Provider, Reviewed the pertinent medical history, Discussed the surgical course, Reviewed Intra-OP anesthesia mangement and issues during anesthesia, Set expectations for post-procedure period and Allowed opportunity for questions and acknowledgement of understanding      Vitals: (Last set prior to Anesthesia Care Transfer)    CRNA VITALS  12/21/2017 0910 - 12/21/2017 0940      12/21/2017             Pulse: 62    SpO2: 99 %                Electronically Signed By: NOBLE Torrez CRNA  December 21, 2017  9:40 AM

## 2017-12-21 NOTE — OP NOTE
OPERATIVE NOTE:  PROCEDURE:  Phacoemulsification of right eye cataract with insertion of intraocular lens.  PREOPERATIVE DIAGNOSIS:  Senile nuclear cataract right eye.  POSTOPERATIVE DIAGNOSIS:  Same  ANESTHESIA:  Local with monitored anesthesia care.  DESCRIPTION OF PROCEDURE:  Marlene Millan was brought to the operating room, and after satisfactory IV sedation a retrobulbar block was done using 5 cc of 2% Xylocaine with Wydase added.  Five minutes of ocular pressure was applied manually and the patient was prepped and draped for surgery of the eye in the usual ophthalmic fashion.  With the use of an operating microscope, a lid speculum was inserted and a side port incision was made with a chandrakant knife.  The chamber was filled with Viscoat and the chandrakant knife was used to make a clear corneal temporal incision of approximately 3 mm.  A pre-bent 25-gauge needle was used to make a circular tear anterior capsulotomy.  The nucleus was loosened with balanced salt injection and rotated.  The phacoemulsification tip was used to emulsify and aspirate the nucleus using a nuclear cracking technique. After the nuclear material was removed, the remaining cortical fragments and cortex were removed a 0.3-mm irrigation aspiration tip.  The posterior capsule was vacuumed and was clear.  Provisc was injected into the anterior chamber to slightly inflate the capsular bag.  The intraocular lens was injected into the capsular bag and rotated to be certain that it was in a secure position.  Provisc was then aspirated from the eye and the wound was hydrated, tested and found to be secure.  The chamber was pressurized to normal pressure.  A drop of 0.5% Timoptic was instilled as well as some Maxitrol ointment.  The lid speculum was removed.  A patch was applied.  The patient left the operating room in good condition.  EBL: None  SPECIMENS:  None  COMPLICATIONS:  None  MD Clif Lerma 12/21/2017 9:43 AM

## 2018-02-13 ENCOUNTER — TRANSFERRED RECORDS (OUTPATIENT)
Dept: HEALTH INFORMATION MANAGEMENT | Facility: CLINIC | Age: 78
End: 2018-02-13

## 2018-03-01 ENCOUNTER — TRANSFERRED RECORDS (OUTPATIENT)
Dept: HEALTH INFORMATION MANAGEMENT | Facility: CLINIC | Age: 78
End: 2018-03-01

## 2018-04-12 ENCOUNTER — ALLIED HEALTH/NURSE VISIT (OUTPATIENT)
Dept: FAMILY MEDICINE | Facility: CLINIC | Age: 78
End: 2018-04-12
Payer: COMMERCIAL

## 2018-04-12 ENCOUNTER — TELEPHONE (OUTPATIENT)
Dept: FAMILY MEDICINE | Facility: CLINIC | Age: 78
End: 2018-04-12

## 2018-04-12 VITALS — HEART RATE: 64 BPM | DIASTOLIC BLOOD PRESSURE: 90 MMHG | SYSTOLIC BLOOD PRESSURE: 154 MMHG

## 2018-04-12 DIAGNOSIS — I10 HTN (HYPERTENSION): Primary | ICD-10-CM

## 2018-04-12 PROCEDURE — 99207 ZZC NO CHARGE NURSE ONLY: CPT

## 2018-04-12 NOTE — MR AVS SNAPSHOT
After Visit Summary   4/12/2018    Marlene Millan    MRN: [id number]           Patient Information     Date Of Birth          1940        Visit Information        Provider Department      4/12/2018 9:30 AM FL CL RN [facility]        Today's Diagnoses     HTN (hypertension)    -  1       Follow-ups after your visit        Who to contact     If you have questions or need follow up information about today's clinic visit or your schedule please contact [facility] directly at [phone].  Normal or non-critical lab and imaging results will be communicated to you by SterraClimbhart, letter or phone within 4 business days after the clinic has received the results. If you do not hear from us within 7 days, please contact the clinic through Qoviat or phone. If you have a critical or abnormal lab result, we will notify you by phone as soon as possible.  Submit refill requests through [employer] or call your pharmacy and they will forward the refill request to us. Please allow 3 business days for your refill to be completed.          Additional Information About Your Visit        MyChart Information     [employer] gives you secure access to your electronic health record. If you see a primary care provider, you can also send messages to your care team and make appointments. If you have questions, please call your primary care clinic.  If you do not have a primary care provider, please call [phone] and they will assist you.        Care EveryWhere ID     This is your Care EveryWhere ID. This could be used by other organizations to access your [place] medical records  [id number]        Your Vitals Were     Pulse                   64            Blood Pressure from Last 3 Encounters:   04/12/18 154/90   12/21/17 153/85   12/13/17 155/84    Weight from Last 3 Encounters:   12/21/17 163 lb (73.9 kg)   12/13/17 165 lb 12.8 oz (75.2 kg)   12/12/16 165 lb 3.2 oz (74.9 kg)               Today, you had the following     No orders found for display       Primary Care Provider Office Phone # Fax #    Katy Cynthia Rodriguez [phone] [address] [address] [facility] [address]        Equal Access to Services     TABBYGLENIS EMMANUEL : Hadii aad ku hadstuo Soomaali, waaxda luqadaha, qaybta kaalmada adeegyada, waxerika idiin hayelidan ademendez light laElizabethjason nicole. So [facility] [place] [facility] [phone].    ATENCIÓN: Si habla español, tiene a aldridge disposición servicios gratuitos de asistencia lingüística. Llame al [phone].    We comply with applicable federal civil rights laws and [place] laws. We do not discriminate on the basis of race, color, national origin, age, disability, sex, sexual orientation, or gender identity.            Thank you!     Thank you for choosing [facility]  for your care. Our goal is always to provide you with excellent care. Hearing back from our patients is one way we can continue to improve our services. Please take a few minutes to complete the written survey that you may receive in the mail after your visit with us. Thank you!             Your Updated Medication List - Protect others around you: Learn how to safely use, store and throw away your medicines at www.disposemymeds.org.          This list is accurate as of 4/12/18 10:04 AM.  Always use your most recent med list.                   Brand Name Dispense Instructions for use Diagnosis    atorvastatin 40 MG tablet    LIPITOR    90 tablet    Take 1 tablet (40 mg) by mouth daily    Hyperlipidemia LDL goal <130       ibuprofen 800 MG tablet    ADVIL/MOTRIN    30 tablet    Take 1 tablet (800 mg) by mouth every 8 hours as needed for moderate pain    Herpes zoster without complication       MACULAR VITAMIN BENEFIT PO      Take 1 tablet by mouth daily        MULTIPLE VITAMINS PO      Take by mouth daily Chewable - 2 of them each day        omeprazole 20 MG CR capsule    priLOSEC    30 capsule    Take 1 capsule (20  mg) by mouth daily 1/2 hour prior to main meal    Gastroesophageal reflux disease, esophagitis presence not specified       valACYclovir 1000 mg tablet    VALTREX    21 tablet    Take 1 tablet (1,000 mg) by mouth 3 times daily    Herpes zoster without complication

## 2018-04-12 NOTE — NURSING NOTE
S-(situation): Patient in clinic today for bp check.  Patient is not taking any medications for bp control.  Patient was at the eye clinic earlier, she is waiting for her eyes to dilate, so decided to have her bp checked as well.  She states that about 2 days ago, she noticed a change in her vision.  She is having a hard time seeing distance.  Patient gives example that she used to be able to read her clock on the wall from across the room and now she has to stand 1-2 feet away from it to read it.  She also noticed that she is having a hard time reading things in front of her.  She reports hx of macular degeneration in her Rt eye.  She had cataract surgery December in her Lt eye.  After her bp check she is returning to eye clinic for further evaluation of vision.  She denies any other s/s of high or low bp.  Patient also states that she did not receive her lipid results from 12/2017.  RN reviewed these results with her.  She did not start the atorvastatin as recommended because she was not aware of results.  She prefers not to start any medications at this time.  She would like to make diet and exercise changes to see if that would help.  She does states that if PCP prefers she go on medication then she will.  Patient was given information on cholesterol.  Patient does not check her bp at home regularly.  She states once in awhile.  She did check this morning and bp was 156/74-78.  She states she does not check her sodium intake.  She also reports that she does not get much physical activity either.  She states with the weather warming up she will try to increase this.  Again, she would prefer to not start any medication and do some lifestyle modifications first to lower bp however if PCP recommends she start medication then she will.    B-(background): Blood pressures in clinic:  1st reading = 158/87, HR - 62  2nd reading = 154/90, HR - 64    A-(assessment): Bp still above goal.    R-(recommendations): Please  advise.   Pharm ready.    Katheryn VERMA RN

## 2018-04-12 NOTE — TELEPHONE ENCOUNTER
Her 10 year calculated risk of CVA, MI is 22%. Because of this I would recommend statin in addition to any dietary/lifestyle changes.    Ok to try lifestyle changes for blood pressure. Would recommend office visit in 3 months for blood pressure re-check and start medications at that time if still elevated.

## 2018-07-03 ENCOUNTER — OFFICE VISIT (OUTPATIENT)
Dept: FAMILY MEDICINE | Facility: CLINIC | Age: 78
End: 2018-07-03
Payer: COMMERCIAL

## 2018-07-03 VITALS
OXYGEN SATURATION: 97 % | SYSTOLIC BLOOD PRESSURE: 110 MMHG | BODY MASS INDEX: 29.95 KG/M2 | HEIGHT: 63 IN | HEART RATE: 60 BPM | RESPIRATION RATE: 12 BRPM | TEMPERATURE: 98.3 F | WEIGHT: 169 LBS | DIASTOLIC BLOOD PRESSURE: 78 MMHG

## 2018-07-03 DIAGNOSIS — J06.9 VIRAL UPPER RESPIRATORY TRACT INFECTION: Primary | ICD-10-CM

## 2018-07-03 LAB
BASOPHILS # BLD AUTO: 0 10E9/L (ref 0–0.2)
BASOPHILS NFR BLD AUTO: 0.3 %
DIFFERENTIAL METHOD BLD: ABNORMAL
EOSINOPHIL # BLD AUTO: 1 10E9/L (ref 0–0.7)
EOSINOPHIL NFR BLD AUTO: 14.4 %
LYMPHOCYTES # BLD AUTO: 2.6 10E9/L (ref 0.8–5.3)
LYMPHOCYTES NFR BLD AUTO: 36.5 %
MONOCYTES # BLD AUTO: 0.6 10E9/L (ref 0–1.3)
MONOCYTES NFR BLD AUTO: 8.1 %
NEUTROPHILS # BLD AUTO: 2.9 10E9/L (ref 1.6–8.3)
NEUTROPHILS NFR BLD AUTO: 40.7 %
WBC # BLD AUTO: 7.2 10E9/L (ref 4–11)

## 2018-07-03 PROCEDURE — 36415 COLL VENOUS BLD VENIPUNCTURE: CPT | Performed by: NURSE PRACTITIONER

## 2018-07-03 PROCEDURE — 99213 OFFICE O/P EST LOW 20 MIN: CPT | Performed by: NURSE PRACTITIONER

## 2018-07-03 PROCEDURE — 85048 AUTOMATED LEUKOCYTE COUNT: CPT | Performed by: NURSE PRACTITIONER

## 2018-07-03 PROCEDURE — 85004 AUTOMATED DIFF WBC COUNT: CPT | Performed by: NURSE PRACTITIONER

## 2018-07-03 ASSESSMENT — PAIN SCALES - GENERAL: PAINLEVEL: NO PAIN (0)

## 2018-07-03 NOTE — PATIENT INSTRUCTIONS
Increase your fluids and rest and eat a well balanced diet.     Tylenol or Ibuprofen if able to take for fevers and discomfort. Do not exceed 4 grams of Tylenol in a 24 hour period. Take Ibuprofen with food.   Follow up in 3-5 days if not improving or return sooner if worsening or fail to improve as anticipated.
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
## 2018-07-03 NOTE — MR AVS SNAPSHOT
After Visit Summary   7/3/2018    Marlene Millan    MRN: [id number]           Patient Information     Date Of Birth          1940        Visit Information        Provider Department      7/3/2018 1:00 PM Viridiana Ladd APRN CNP [facility]        Today's Diagnoses     Viral upper respiratory tract infection    -  1      Care Instructions    Increase your fluids and rest and eat a well balanced diet.     Tylenol or Ibuprofen if able to take for fevers and discomfort. Do not exceed 4 grams of Tylenol in a 24 hour period. Take Ibuprofen with food.   Follow up in 3-5 days if not improving or return sooner if worsening or fail to improve as anticipated.              Follow-ups after your visit        Who to contact     If you have questions or need follow up information about today's clinic visit or your schedule please contact [facility] directly at [phone].  Normal or non-critical lab and imaging results will be communicated to you by Skinfixhart, letter or phone within 4 business days after the clinic has received the results. If you do not hear from us within 7 days, please contact the clinic through Skinfixhart or phone. If you have a critical or abnormal lab result, we will notify you by phone as soon as possible.  Submit refill requests through PollGround or call your pharmacy and they will forward the refill request to us. Please allow 3 business days for your refill to be completed.          Additional Information About Your Visit        MyChart Information     PollGround gives you secure access to your electronic health record. If you see a primary care provider, you can also send messages to your care team and make appointments. If you have questions, please call your primary care clinic.  If you do not have a primary care provider, please call [phone] and they will assist you.        Care EveryWhere ID     This is your Care EveryWhere ID. This could  "be used by other organizations to access your [place] medical records  [id number]        Your Vitals Were     Pulse Temperature Respirations Height Pulse Oximetry Breastfeeding?    60 98.3  F (36.8  C) (Tympanic) 12 5' 3.25\" (1.607 m) 97% No    BMI (Body Mass Index)                   29.7 kg/m2            Blood Pressure from Last 3 Encounters:   07/03/18 110/78   04/12/18 154/90   12/21/17 153/85    Weight from Last 3 Encounters:   07/03/18 169 lb (76.7 kg)   12/21/17 163 lb (73.9 kg)   12/13/17 165 lb 12.8 oz (75.2 kg)              We Performed the Following     WBC with Diff        Primary Care Provider Office Phone # Fax #    Katy Rodriguez [phone] [address] [facility] [address]        Equal Access to Services     [facility] : Hadii leigh ku hadasho Soalbertina, waaxda luqadaha, qaybta kaalmada adeegyada, loni reynolds . So [facility] [phone].    ATENCIÓN: Si salla español, tiene a aldridge disposición servicios gratuitos de asistencia lingüística. Llame al [phone].    We comply with applicable federal civil rights laws and [place] laws. We do not discriminate on the basis of race, color, national origin, age, disability, sex, sexual orientation, or gender identity.            Thank you!     Thank you for choosing [facility]  for your care. Our goal is always to provide you with excellent care. Hearing back from our patients is one way we can continue to improve our services. Please take a few minutes to complete the written survey that you may receive in the mail after your visit with us. Thank you!             Your Updated Medication List - Protect others around you: Learn how to safely use, store and throw away your medicines at www.disposemymeds.org.          This list is accurate as of 7/3/18  2:00 PM.  Always use your most recent med list.                   Brand Name Dispense Instructions for use Diagnosis    " atorvastatin 40 MG tablet    LIPITOR    90 tablet    Take 1 tablet (40 mg) by mouth daily    Hyperlipidemia LDL goal <130       ibuprofen 800 MG tablet    ADVIL/MOTRIN    30 tablet    Take 1 tablet (800 mg) by mouth every 8 hours as needed for moderate pain    Herpes zoster without complication       MACULAR VITAMIN BENEFIT PO      Take 1 tablet by mouth daily        MULTIPLE VITAMINS PO      Take by mouth daily Chewable - 2 of them each day        omeprazole 20 MG CR capsule    priLOSEC    30 capsule    Take 1 capsule (20 mg) by mouth daily 1/2 hour prior to main meal    Gastroesophageal reflux disease, esophagitis presence not specified       valACYclovir 1000 mg tablet    VALTREX    21 tablet    Take 1 tablet (1,000 mg) by mouth 3 times daily    Herpes zoster without complication

## 2018-12-20 ENCOUNTER — OFFICE VISIT (OUTPATIENT)
Dept: FAMILY MEDICINE | Facility: CLINIC | Age: 78
End: 2018-12-20
Payer: COMMERCIAL

## 2018-12-20 VITALS
OXYGEN SATURATION: 97 % | WEIGHT: 169 LBS | HEART RATE: 68 BPM | SYSTOLIC BLOOD PRESSURE: 130 MMHG | TEMPERATURE: 97.8 F | HEIGHT: 63 IN | RESPIRATION RATE: 16 BRPM | DIASTOLIC BLOOD PRESSURE: 80 MMHG | BODY MASS INDEX: 29.95 KG/M2

## 2018-12-20 DIAGNOSIS — M19.041 PRIMARY OSTEOARTHRITIS OF BOTH HANDS: ICD-10-CM

## 2018-12-20 DIAGNOSIS — K21.9 GASTROESOPHAGEAL REFLUX DISEASE WITHOUT ESOPHAGITIS: ICD-10-CM

## 2018-12-20 DIAGNOSIS — M19.042 PRIMARY OSTEOARTHRITIS OF BOTH HANDS: ICD-10-CM

## 2018-12-20 DIAGNOSIS — K21.9 GASTROESOPHAGEAL REFLUX DISEASE, ESOPHAGITIS PRESENCE NOT SPECIFIED: ICD-10-CM

## 2018-12-20 DIAGNOSIS — S30.0XXA CONTUSION OF SACRUM, INITIAL ENCOUNTER: ICD-10-CM

## 2018-12-20 DIAGNOSIS — S06.0X0A CONCUSSION WITHOUT LOSS OF CONSCIOUSNESS, INITIAL ENCOUNTER: Primary | ICD-10-CM

## 2018-12-20 PROCEDURE — 99214 OFFICE O/P EST MOD 30 MIN: CPT | Performed by: FAMILY MEDICINE

## 2018-12-20 ASSESSMENT — MIFFLIN-ST. JEOR: SCORE: 1229.67

## 2018-12-20 NOTE — TELEPHONE ENCOUNTER
"Requested Prescriptions   Pending Prescriptions Disp Refills     omeprazole (PRILOSEC) 20 MG DR capsule 30 capsule 1     Sig: Take 1 capsule (20 mg) by mouth daily 1/2 hour prior to main meal    PPI Protocol Passed - 12/20/2018  9:42 AM       Passed - Not on Clopidogrel (unless Pantoprazole ordered)       Passed - No diagnosis of osteoporosis on record       Passed - Recent (12 mo) or future (30 days) visit within the authorizing provider's specialty    Patient had office visit in the last 12 months or has a visit in the next 30 days with authorizing provider or within the authorizing provider's specialty.  See \"Patient Info\" tab in inbasket, or \"Choose Columns\" in Meds & Orders section of the refill encounter.             Passed - Patient is age 18 or older       Passed - No active pregnacy on record       Passed - No positive pregnancy test in past 12 months        Prescription approved per [facility] refill protocol. Carol ESCALANTE RN      "
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
## 2018-12-20 NOTE — PATIENT INSTRUCTIONS
Try the diclofenac gel for your hands.    Your concussion symptoms will slowly get better.    The contusion of the sacrum will slowly get better also.    I would recommend you take the omeprazole daily and consider scheduling an UGI endoscopy

## 2018-12-27 DIAGNOSIS — E78.5 HYPERLIPIDEMIA LDL GOAL <130: ICD-10-CM

## 2018-12-27 NOTE — LETTER
[facility]  [address]  [facility] [address]  [phone]        December 28, 2018    Marlene Millan                                                                                                                     [address] [facility] [address]            Dear Marlene,    We have received a refill request from your pharmacy, however, we were only able to provide a one time fill because you are due for a fasting cholesterol check.     Please call [phone] to schedule an appointment before you are due for your next refill.          Sincerely,         Katy Rodriguez's Care Team

## 2018-12-28 RX ORDER — ATORVASTATIN CALCIUM 40 MG/1
TABLET, FILM COATED ORAL
Qty: 30 TABLET | Refills: 0 | Status: SHIPPED | OUTPATIENT
Start: 2018-12-28 | End: 2019-04-26

## 2018-12-28 NOTE — TELEPHONE ENCOUNTER
"Requested Prescriptions   Pending Prescriptions Disp Refills     atorvastatin (LIPITOR) 40 MG tablet [Pharmacy Med Name: ATORVASTATIN CALCIUM 40MG TABS] 90 tablet 3     Sig: TAKE ONE TABLET BY MOUTH EVERY DAY    Statins Protocol Failed - 12/27/2018  6:40 PM       Failed - LDL on file in past 12 months    Recent Labs   Lab Test 12/13/17  0854   *            Passed - No abnormal creatine kinase in past 12 months    No lab results found.            Passed - Recent (12 mo) or future (30 days) visit within the authorizing provider's specialty    Patient had office visit in the last 12 months or has a visit in the next 30 days with authorizing provider or within the authorizing provider's specialty.  See \"Patient Info\" tab in inbasket, or \"Choose Columns\" in Meds & Orders section of the refill encounter.      Last Written Prescription Date:  12/14/17  Last Fill Quantity: 90,  # refills: 3   Last office visit: 12/20/2018 with prescribing provider:     Future Office Visit:               Passed - Patient is age 18 or older       Passed - No active pregnancy on record       Passed - No positive pregnancy test in past 12 months          "

## 2019-02-14 ENCOUNTER — TRANSFERRED RECORDS (OUTPATIENT)
Dept: HEALTH INFORMATION MANAGEMENT | Facility: CLINIC | Age: 79
End: 2019-02-14

## 2019-04-11 ENCOUNTER — TRANSFERRED RECORDS (OUTPATIENT)
Dept: HEALTH INFORMATION MANAGEMENT | Facility: CLINIC | Age: 79
End: 2019-04-11

## 2019-04-16 ENCOUNTER — OFFICE VISIT (OUTPATIENT)
Dept: FAMILY MEDICINE | Facility: CLINIC | Age: 79
End: 2019-04-16
Payer: COMMERCIAL

## 2019-04-16 VITALS
BODY MASS INDEX: 29.3 KG/M2 | WEIGHT: 165.4 LBS | DIASTOLIC BLOOD PRESSURE: 80 MMHG | TEMPERATURE: 98 F | HEIGHT: 63 IN | HEART RATE: 73 BPM | OXYGEN SATURATION: 99 % | RESPIRATION RATE: 16 BRPM | SYSTOLIC BLOOD PRESSURE: 142 MMHG

## 2019-04-16 DIAGNOSIS — R07.89 ATYPICAL CHEST PAIN: ICD-10-CM

## 2019-04-16 DIAGNOSIS — K12.2 UVULITIS: Primary | ICD-10-CM

## 2019-04-16 DIAGNOSIS — R07.0 THROAT PAIN: ICD-10-CM

## 2019-04-16 LAB
DEPRECATED S PYO AG THROAT QL EIA: NORMAL
SPECIMEN SOURCE: NORMAL

## 2019-04-16 PROCEDURE — 99214 OFFICE O/P EST MOD 30 MIN: CPT | Performed by: NURSE PRACTITIONER

## 2019-04-16 PROCEDURE — 87081 CULTURE SCREEN ONLY: CPT | Performed by: NURSE PRACTITIONER

## 2019-04-16 PROCEDURE — 87880 STREP A ASSAY W/OPTIC: CPT | Performed by: NURSE PRACTITIONER

## 2019-04-16 RX ORDER — AMOXICILLIN 500 MG/1
500 CAPSULE ORAL 2 TIMES DAILY
Qty: 20 CAPSULE | Refills: 0 | Status: SHIPPED | OUTPATIENT
Start: 2019-04-16 | End: 2019-04-26

## 2019-04-16 ASSESSMENT — MIFFLIN-ST. JEOR: SCORE: 1213.34

## 2019-04-16 NOTE — PROGRESS NOTES
SUBJECTIVE:   Marlene Millan is a 78 year old female who presents to clinic today for the following   health issues:      ENT Symptoms             Symptoms: cc Present Absent Comment   Fever/Chills   x    Fatigue  x  Feels run down and sick   Muscle Aches   x    Eye Irritation   x    Sneezing   x    Nasal Deonte/Drg  x  Post nasal drainage   Sinus Pressure/Pain   x    Loss of smell   x    Dental pain   x    Sore Throat x x  In the middle, worse at night and in the morning. Low appetite, hurts to swallow. Denies worsening GERD, on Omeprazole.   Swollen Glands   x    Ear Pain/Fullness   x    Cough  x  productive   Wheeze   x    Chest Pain  x  Off and on. Happens at rest, no associated dyspnea, diaphoresis, dizziness. Unsure if related to eating.     Shortness of breath   x    Rash   x    Other    Denies hx of allergies, denies sneezing or itchy eyes.      Symptom duration:  8 days    Symptom severity:  moderate to severe    Treatments tried:  benadryl, cough drops, advil, antihistamine    Contacts:  none        Additional history: as documented    Reviewed  and updated as needed this visit by clinical staff  Tobacco  Allergies  Meds  Med Hx  Surg Hx  Fam Hx  Soc Hx        Reviewed and updated as needed this visit by Provider         Patient Active Problem List   Diagnosis     Macular degeneration     Spinal Stenosis of Lumbar Region     HYPERLIPIDEMIA LDL GOAL <130     GERD (gastroesophageal reflux disease)     Patent foramen ovale     Advanced directives, counseling/discussion     Health Care Home     ASD (atrial septal defect)     Senile nuclear cataract     Past Surgical History:   Procedure Laterality Date     basal cell cancer of nose  Oct 2012     BREAST SURGERY      breast reduction     CARPAL TUNNEL RELEASE RT/LT      right     COMBINED REPAIR PTOSIS WITH BLEPHAROPLASTY BILATERAL Bilateral 6/30/2015    Procedure: COMBINED REPAIR PTOSIS WITH BLEPHAROPLASTY BILATERAL;  Surgeon: Ilir Marvin MD;   "Location: [facility]     HC REMOVAL GALLBLADDER       LIGATE VEIN VARICOSE, PHLEBECTOMY MULTIPLE STAB, COMBINED Bilateral 2015    Procedure: COMBINED LIGATE VEIN VARICOSE, PHLEBECTOMY MULTIPLE STAB;  Surgeon: Alphonse Pro MD;  Location: [facility]     ORTHOPEDIC SURGERY      bilateral knee     PHACOEMULSIFICATION WITH STANDARD INTRAOCULAR LENS IMPLANT Right 2017    Procedure: PHACOEMULSIFICATION WITH STANDARD INTRAOCULAR LENS IMPLANT;  Right Cataract Removal with Implant;  Surgeon: Clif Michel MD;  Location: [facility]     SURGICAL HISTORY OF -       breast reduction mammoplasty      SURGICAL HISTORY OF -       bilateral lower extremity vein stripping     SURGICAL HISTORY OF -   2010    left total knee arthroplasty       Social History     Tobacco Use     Smoking status: Former Smoker     Years: 20.00     Types: Cigarettes     Last attempt to quit: 1982     Years since quittin.1     Smokeless tobacco: Never Used   Substance Use Topics     Alcohol use: Yes     Comment: milena wine     Family History   Problem Relation Age of Onset     Diabetes Father         last both legs to the disease     Heart Disease Father          age 91           ROS:  Constitutional, HEENT, cardiovascular, pulmonary, gi and gu systems are negative, except as otherwise noted.    OBJECTIVE:     /80 (BP Location: Right arm, Patient Position: Sitting, Cuff Size: Adult Regular)   Pulse 73   Temp 98  F (36.7  C) (Tympanic)   Resp 16   Ht 1.607 m (5' 3.25\")   Wt 75 kg (165 lb 6.4 oz)   SpO2 99%   BMI 29.07 kg/m    Body mass index is 29.07 kg/m .  GENERAL: healthy, alert and no distress  EYES: Eyes grossly normal to inspection, PERRL and conjunctivae and sclerae normal  HENT: normal cephalic/atraumatic, ear canals and TM's normal, nose and mouth without ulcers or lesions, oropharynx clear, oral mucous membranes moist, sinuses: not tender and uvula edematous and erythematous  NECK: no adenopathy, no " asymmetry, masses, or scars and thyroid normal to palpation  RESP: lungs clear to auscultation - no rales, rhonchi or wheezes  CV: regular rates and rhythm, normal S1 S2, no S3 or S4, no murmur, click or rub, peripheral pulses strong and no peripheral edema  MS: no gross musculoskeletal defects noted, no edema  SKIN: no suspicious lesions or rashes  NEURO: Normal strength and tone, mentation intact and speech normal  PSYCH: mentation appears normal, affect normal/bright    Diagnostic Test Results:  Results for orders placed or performed in visit on 04/16/19 (from the past 24 hour(s))   Strep, Rapid Screen   Result Value Ref Range    Specimen Description Throat     Rapid Strep A Screen       NEGATIVE: No Group A streptococcal antigen detected by immunoassay, await culture report.       ASSESSMENT/PLAN:       ICD-10-CM    1. Uvulitis K12.2 amoxicillin (AMOXIL) 500 MG capsule   2. Throat pain R07.0 Strep, Rapid Screen     Beta strep group A culture   3. Atypical chest pain R07.89        CONSULTATION/REFERRAL to PCP as planned for physical next week. Suggested we perform EKG today, patient declines, states she does not have time today. Wishes to discuss with PCP next week. No chest pain for over 5 days. No associated symptoms. Happens primarily at rest she thinks. Discussed paying attention to associated symptoms/precipitating factors and discuss with Dr. Rodriguez next week. Does not sound like typical classic angina. Reviewed red flag symptoms that should prompt an ER visit.    FUTURE APPOINTMENTS:       - Follow up in 1 week for persistent throat symptoms, sooner for new or worsening symptoms, trial Amoxicillin. Question whether the chest pain and throat issues may all be related to GERD but patient does not believe that to be the case, Dr. Leos has recommended upper GI endoscopy to her in the past. Also had increased eosinophils on CBC 07/2018 when she was having similar symptoms, she has tried antihistamines for  this without success.     Patient Instructions       Patient Education     Uvulitis    The uvula is the tissue that hangs in the back of the throat. Uvulitis is inflammation of the uvula. Inflammation happens when the body responds to an injury, allergic reaction, infection or illness. Symptoms of inflammation may include redness, irritation, itching, swelling, or burning. Uvulitis is more common in children than adults.  Symptoms of uvulitis include:    Sore throat    Trouble swallowing    Painful swallowing    Trouble breathing  Possible causes of uvulitis include:    Throat infection    Inhaling or swallowing chemicals     Inhaling hot air or steam    Allergic reaction to something eaten, touched or breathed in  In rare cases, uvulitis can be caused by a condition called angioedema. Angioendema can be:    Hereditary (runs in the family)    A side effect of a class of drugs used for high blood pressure called ACE inhibitors    Life-threatening. It can lead to swelling of the air passage in the mouth or throat. Severe swelling can block your breathing and cause death. Watch for the earliest signs of this illness. Call 911 if the swelling involves the face, mouth, or throat areas.  To help find the cause of the uvulitis, imaging tests may be done. If infection is suspected, swabs from the throat or samples of blood may be tested. Questions may be asked about an individual s vaccination history to be sure it is up to date. A cause for uvulitis is not always found.  Treatment depends on the cause and the severity of the symptoms.  Home care  Medicines  The doctor may prescribe antibiotics for an infection. For an allergic reaction or angioedema, medicines called steroids or antihistamines may be given. Follow instructions when using any medicine.  General care  To care for the condition at home:    Rest until the symptoms go away.    If medicines were prescribed, be sure they are taken as directed. They should be  taken until they are gone or the healthcare provider says to stop them.    If you were told that your angioedema was from a medicine that you are taking, you must stop taking this medicine. Contact your doctor for a prescription for a different medicine. Advise future medical providers that you are allergic to this medicine.    Contact your healthcare provider before taking any over-the-counter medicines.    Drink fluids. Pain when swallowing may make it harder to drink and lead to dehydration. To prevent this, sip fluids throughout the day. Children can be given frozen juice bars, milk, or other cold liquids. Watch for the signs of dehydration listed below.    Ask your healthcare provider when you can return to work or school. Ask your child s healthcare provider when your child can return to school or .  Follow-up care  Follow up with the healthcare provider, or as directed. You may be referred to an allergy doctor or an ear, nose, or throat (ENT) doctor for further evaluation and treatment. Make these visits as soon as possible.  When to seek medical advice  Call the healthcare provider right away for any of the following:    Symptoms not going away or getting worse    Symptoms of dehydration, including dark urine, dry mouth, cracked lips, dizziness, or sunken eyes    A child acting very sick or not improving    Fever over 100.4 F (38 C) in adults, or as directed by your healthcare provider    Fever in children (see Fever and children, below)  Call 911  Call 911 if any of these occur:    Drooling or trouble swallowing    Trouble breathing    Trouble talking    Swollen tongue, lips, face, or throat. You can tell this if your child's voice changes.    Jerking and loss of consciousness; seizure    Lack of response, extreme drowsiness, or trouble waking up    Fainting    Confusion    Child being unresponsive    Skin or lips look blue, purple, or gray  Fever and children  Always use a digital thermometer to  check your child s temperature. Never use a mercury thermometer.  For infants and toddlers, be sure to use a rectal thermometer correctly. A rectal thermometer may accidentally poke a hole in (perforate) the rectum. It may also pass on germs from the stool. Always follow the product maker s directions for proper use. If you don t feel comfortable taking a rectal temperature, use another method. When you talk to your child s healthcare provider, tell him or her which method you used to take your child s temperature.  Here are guidelines for fever temperature. Ear temperatures aren t accurate before 6 months of age. Don t take an oral temperature until your child is at least 4 years old.  Infant under 3 months old:    Ask your child s healthcare provider how you should take the temperature.    Rectal or forehead (temporal artery) temperature of 100.4 F (38 C) or higher, or as directed by the provider    Armpit temperature of 99 F (37.2 C) or higher, or as directed by the provider  Child age 3 to 36 months:    Rectal, forehead (temporal artery), or ear temperature of 102 F (38.9 C) or higher, or as directed by the provider    Armpit temperature of 101 F (38.3 C) or higher, or as directed by the provider  Child of any age:    Repeated temperature of 104 F (40 C) or higher, or as directed by the provider    Fever that lasts more than 24 hours in a child under 2 years old. Or a fever that lasts for 3 days in a child 2 years or older.   Date Last Reviewed: 5/1/2018 2000-2018 [employer]. [address] [place] [address], [place] [address]. All rights reserved. This information is not intended as a substitute for professional medical care. Always follow your healthcare professional's instructions.               NOBLE Sepulveda [facility]

## 2019-04-16 NOTE — PATIENT INSTRUCTIONS
Patient Education     Uvulitis    The uvula is the tissue that hangs in the back of the throat. Uvulitis is inflammation of the uvula. Inflammation happens when the body responds to an injury, allergic reaction, infection or illness. Symptoms of inflammation may include redness, irritation, itching, swelling, or burning. Uvulitis is more common in children than adults.  Symptoms of uvulitis include:    Sore throat    Trouble swallowing    Painful swallowing    Trouble breathing  Possible causes of uvulitis include:    Throat infection    Inhaling or swallowing chemicals     Inhaling hot air or steam    Allergic reaction to something eaten, touched or breathed in  In rare cases, uvulitis can be caused by a condition called angioedema. Angioendema can be:    Hereditary (runs in the family)    A side effect of a class of drugs used for high blood pressure called ACE inhibitors    Life-threatening. It can lead to swelling of the air passage in the mouth or throat. Severe swelling can block your breathing and cause death. Watch for the earliest signs of this illness. Call 911 if the swelling involves the face, mouth, or throat areas.  To help find the cause of the uvulitis, imaging tests may be done. If infection is suspected, swabs from the throat or samples of blood may be tested. Questions may be asked about an individual s vaccination history to be sure it is up to date. A cause for uvulitis is not always found.  Treatment depends on the cause and the severity of the symptoms.  Home care  Medicines  The doctor may prescribe antibiotics for an infection. For an allergic reaction or angioedema, medicines called steroids or antihistamines may be given. Follow instructions when using any medicine.  General care  To care for the condition at home:    Rest until the symptoms go away.    If medicines were prescribed, be sure they are taken as directed. They should be taken until they are gone or the healthcare provider  says to stop them.    If you were told that your angioedema was from a medicine that you are taking, you must stop taking this medicine. Contact your doctor for a prescription for a different medicine. Advise future medical providers that you are allergic to this medicine.    Contact your healthcare provider before taking any over-the-counter medicines.    Drink fluids. Pain when swallowing may make it harder to drink and lead to dehydration. To prevent this, sip fluids throughout the day. Children can be given frozen juice bars, milk, or other cold liquids. Watch for the signs of dehydration listed below.    Ask your healthcare provider when you can return to work or school. Ask your child s healthcare provider when your child can return to school or .  Follow-up care  Follow up with the healthcare provider, or as directed. You may be referred to an allergy doctor or an ear, nose, or throat (ENT) doctor for further evaluation and treatment. Make these visits as soon as possible.  When to seek medical advice  Call the healthcare provider right away for any of the following:    Symptoms not going away or getting worse    Symptoms of dehydration, including dark urine, dry mouth, cracked lips, dizziness, or sunken eyes    A child acting very sick or not improving    Fever over 100.4 F (38 C) in adults, or as directed by your healthcare provider    Fever in children (see Fever and children, below)  Call 911  Call 911 if any of these occur:    Drooling or trouble swallowing    Trouble breathing    Trouble talking    Swollen tongue, lips, face, or throat. You can tell this if your child's voice changes.    Jerking and loss of consciousness; seizure    Lack of response, extreme drowsiness, or trouble waking up    Fainting    Confusion    Child being unresponsive    Skin or lips look blue, purple, or gray  Fever and children  Always use a digital thermometer to check your child s temperature. Never use a mercury  thermometer.  For infants and toddlers, be sure to use a rectal thermometer correctly. A rectal thermometer may accidentally poke a hole in (perforate) the rectum. It may also pass on germs from the stool. Always follow the product maker s directions for proper use. If you don t feel comfortable taking a rectal temperature, use another method. When you talk to your child s healthcare provider, tell him or her which method you used to take your child s temperature.  Here are guidelines for fever temperature. Ear temperatures aren t accurate before 6 months of age. Don t take an oral temperature until your child is at least 4 years old.  Infant under 3 months old:    Ask your child s healthcare provider how you should take the temperature.    Rectal or forehead (temporal artery) temperature of 100.4 F (38 C) or higher, or as directed by the provider    Armpit temperature of 99 F (37.2 C) or higher, or as directed by the provider  Child age 3 to 36 months:    Rectal, forehead (temporal artery), or ear temperature of 102 F (38.9 C) or higher, or as directed by the provider    Armpit temperature of 101 F (38.3 C) or higher, or as directed by the provider  Child of any age:    Repeated temperature of 104 F (40 C) or higher, or as directed by the provider    Fever that lasts more than 24 hours in a child under 2 years old. Or a fever that lasts for 3 days in a child 2 years or older.   Date Last Reviewed: 5/1/2018 2000-2018 [employer]. [address] [place] [address], [place] [address]. All rights reserved. This information is not intended as a substitute for professional medical care. Always follow your healthcare professional's instructions.

## 2019-04-16 NOTE — LETTER
April 17, 2019      Marlene Millan  [address] [facility] [id number]        The results of your 24 hour throat culture were negative. If you have any further questions please contact your clinic.            Sincerely,        NOBLE Sepulveda CNP

## 2019-04-17 LAB
BACTERIA SPEC CULT: NORMAL
SPECIMEN SOURCE: NORMAL

## 2019-04-17 NOTE — RESULT ENCOUNTER NOTE
Jayant Rosario    Your throat culture results came back within normal limits/negative. Please let us know if you have any questions.     Thanks for coming in to the clinic.    NOBLE Garcia CNP

## 2019-04-25 ENCOUNTER — OFFICE VISIT (OUTPATIENT)
Dept: FAMILY MEDICINE | Facility: CLINIC | Age: 79
End: 2019-04-25
Payer: COMMERCIAL

## 2019-04-25 VITALS
HEART RATE: 64 BPM | RESPIRATION RATE: 16 BRPM | SYSTOLIC BLOOD PRESSURE: 130 MMHG | OXYGEN SATURATION: 98 % | TEMPERATURE: 98.7 F | DIASTOLIC BLOOD PRESSURE: 70 MMHG | HEIGHT: 64 IN | WEIGHT: 165.4 LBS | BODY MASS INDEX: 28.24 KG/M2

## 2019-04-25 DIAGNOSIS — E78.5 HYPERLIPIDEMIA LDL GOAL <130: ICD-10-CM

## 2019-04-25 DIAGNOSIS — Z00.00 WELL ADULT EXAM: Primary | ICD-10-CM

## 2019-04-25 DIAGNOSIS — R00.2 PALPITATIONS: ICD-10-CM

## 2019-04-25 DIAGNOSIS — M19.90 OSTEOARTHRITIS, UNSPECIFIED OSTEOARTHRITIS TYPE, UNSPECIFIED SITE: ICD-10-CM

## 2019-04-25 DIAGNOSIS — Z78.0 POSTMENOPAUSAL STATUS: ICD-10-CM

## 2019-04-25 LAB
CHOLEST SERPL-MCNC: 225 MG/DL
HDLC SERPL-MCNC: 54 MG/DL
LDLC SERPL CALC-MCNC: 149 MG/DL
NONHDLC SERPL-MCNC: 171 MG/DL
TRIGL SERPL-MCNC: 111 MG/DL

## 2019-04-25 PROCEDURE — G0438 PPPS, INITIAL VISIT: HCPCS | Performed by: FAMILY MEDICINE

## 2019-04-25 PROCEDURE — 80061 LIPID PANEL: CPT | Performed by: FAMILY MEDICINE

## 2019-04-25 PROCEDURE — 36415 COLL VENOUS BLD VENIPUNCTURE: CPT | Performed by: FAMILY MEDICINE

## 2019-04-25 PROCEDURE — 99213 OFFICE O/P EST LOW 20 MIN: CPT | Mod: 25 | Performed by: FAMILY MEDICINE

## 2019-04-25 PROCEDURE — 93000 ELECTROCARDIOGRAM COMPLETE: CPT | Performed by: FAMILY MEDICINE

## 2019-04-25 ASSESSMENT — MIFFLIN-ST. JEOR: SCORE: 1217.31

## 2019-04-25 ASSESSMENT — PAIN SCALES - GENERAL: PAINLEVEL: NO PAIN (0)

## 2019-04-25 ASSESSMENT — ACTIVITIES OF DAILY LIVING (ADL): CURRENT_FUNCTION: NO ASSISTANCE NEEDED

## 2019-04-25 NOTE — PROGRESS NOTES
"SUBJECTIVE:   Marlene Millan is a 78 year old female who presents for Preventive Visit.     Discussed that additional charges may be applied for addressing additional concerns at today's visit.  Patient verbalized understanding and would like additional concerns addressed today.   Chief Complaint   Patient presents with     Physical     Finger     pain in fingers in right hand, and has trouble getting a  to open a jar or anything that may need a    Also hs been having intermittent palpitations. No chest pain, shortness of breath, lightheadedness, dizziness, diaphoresis, nausea/vomiting.     Are you in the first 12 months of your Medicare coverage?  No    Healthy Habits:    In general, how would you rate your overall health?  Good    Frequency of exercise:  2-3 days/week    Duration of exercise:  15-30 minutes    Do you usually eat at least 4 servings of fruit and vegetables a day, include whole grains    & fiber and avoid regularly eating high fat or \"junk\" foods?  No    Taking medications regularly:  No    Barriers to taking medications:  Other    Medication side effects:  Other    Ability to successfully perform activities of daily living:  No assistance needed    Home Safety:  Lack of grab bars in the bathroom    Hearing Impairment:  Difficulty following a conversation in a noisy restaurant or crowded room    In the past 6 months, have you been bothered by leaking of urine?  No    In general, how would you rate your overall mental or emotional health?  Very good      PHQ-2 Total Score:    Additional concerns today:  Yes    Do you feel safe in your environment? Yes    Do you have a Health Care Directive? No: Advance care planning was reviewed with patient; patient declined at this time.      Fall risk  Fallen 2 or more times in the past year?: Yes  Any fall with injury in the past year?: No  Timed Up and Go Test (>13.5 is fall risk; contact physician) : 8    Cognitive Screening   1) Repeat 3 items " (Leader, Season, Table)    2) Clock draw: NORMAL  3) 3 item recall: Recalls 2 objects   Results: NORMAL clock, 1-2 items recalled: COGNITIVE IMPAIRMENT LESS LIKELY    Mini-CogTM Copyright LOUISA Vanegas. Licensed by the author for use in [facility]; reprinted with permission (aura@[facility]). All rights reserved.      Do you have sleep apnea, excessive snoring or daytime drowsiness?: no    Reviewed and updated as needed this visit by clinical staff  Tobacco  Allergies  Meds  Med Hx  Surg Hx  Fam Hx  Soc Hx        Reviewed and updated as needed this visit by Provider        Social History     Tobacco Use     Smoking status: Former Smoker     Years: 20.00     Types: Cigarettes     Last attempt to quit: 1982     Years since quittin.2     Smokeless tobacco: Never Used   Substance Use Topics     Alcohol use: Yes     Comment: occ wine     If you drink alcohol do you typically have >3 drinks per day or >7 drinks per week? No    Alcohol Use 2019   Prescreen: >3 drinks/day or >7 drinks/week? No             Current providers sharing in care for this patient include:   Patient Care Team:  Katy Rodriguez MD as PCP - General (Family Practice)  Post, ELIE Ramirez MD as Assigned PCP    The following health maintenance items are reviewed in Epic and correct as of today:  Health Maintenance   Topic Date Due     ZOSTER IMMUNIZATION (1 of 2) 1992     DEXA SCAN SCREENING (SYSTEM ASSIGNED)  2007     MEDICARE ANNUAL WELLNESS VISIT  2015     INFLUENZA VACCINE (1) 2018     PHQ-2  2019     ADVANCE DIRECTIVE PLANNING Q5 YRS  2019     FALL RISK ASSESSMENT  2019     LIPID SCREEN Q5 YR FEMALE (SYSTEM ASSIGNED)  2022     DTAP/TDAP/TD IMMUNIZATION (2 - Td) 2027     IPV IMMUNIZATION  Aged Out     MENINGITIS IMMUNIZATION  Aged Out     Labs reviewed in EPIC  Patient Active Problem List   Diagnosis     Macular degeneration     Spinal Stenosis of Lumbar Region      HYPERLIPIDEMIA LDL GOAL <130     GERD (gastroesophageal reflux disease)     Patent foramen ovale     Advanced directives, counseling/discussion     Health Care Home     ASD (atrial septal defect)     Senile nuclear cataract     Past Surgical History:   Procedure Laterality Date     basal cell cancer of nose  Oct 2012     BREAST SURGERY      breast reduction     CARPAL TUNNEL RELEASE RT/LT      right     COMBINED REPAIR PTOSIS WITH BLEPHAROPLASTY BILATERAL Bilateral 2015    Procedure: COMBINED REPAIR PTOSIS WITH BLEPHAROPLASTY BILATERAL;  Surgeon: Ilir Marvin MD;  Location:  [facility]     HC REMOVAL GALLBLADDER       LIGATE VEIN VARICOSE, PHLEBECTOMY MULTIPLE STAB, COMBINED Bilateral 2015    Procedure: COMBINED LIGATE VEIN VARICOSE, PHLEBECTOMY MULTIPLE STAB;  Surgeon: Alphonse Pro MD;  Location: [facility]     ORTHOPEDIC SURGERY      bilateral knee     PHACOEMULSIFICATION WITH STANDARD INTRAOCULAR LENS IMPLANT Right 2017    Procedure: PHACOEMULSIFICATION WITH STANDARD INTRAOCULAR LENS IMPLANT;  Right Cataract Removal with Implant;  Surgeon: Clif Michel MD;  Location: [facility]     SURGICAL HISTORY OF -       breast reduction mammoplasty      SURGICAL HISTORY OF -       bilateral lower extremity vein stripping     SURGICAL HISTORY OF -   2010    left total knee arthroplasty       Social History     Tobacco Use     Smoking status: Former Smoker     Years: 20.00     Types: Cigarettes     Last attempt to quit: 1982     Years since quittin.2     Smokeless tobacco: Never Used   Substance Use Topics     Alcohol use: Yes     Comment: occ wine     Family History   Problem Relation Age of Onset     Diabetes Father         last both legs to the disease     Heart Disease Father          age 91         Current Outpatient Medications   Medication Sig Dispense Refill     diclofenac (VOLTAREN) 1 % topical gel Apply 4 grams to knees or 2 grams to hands four times daily using  "enclosed dosing card. 100 g 1     ibuprofen (ADVIL/MOTRIN) 800 MG tablet Take 1 tablet (800 mg) by mouth every 8 hours as needed for moderate pain 30 tablet 1     Multiple Vitamins-Minerals (HAIR SKIN AND NAILS FORMULA PO) Take 2 chew tab by mouth       Multiple Vitamins-Minerals (MACULAR VITAMIN BENEFIT PO) Take 1 tablet by mouth daily       omeprazole (PRILOSEC) 20 MG DR capsule Take 1 capsule (20 mg) by mouth daily 1/2 hour prior to main meal 90 capsule 1     atorvastatin (LIPITOR) 40 MG tablet Take 1 tablet (40 mg) by mouth daily 90 tablet 3     MULTIPLE VITAMINS PO Take by mouth daily Chewable - 2 of them each day       Allergies   Allergen Reactions     Sulfa Drugs      Affected eyesight     Mammogram Screening: Patient over age 75, has elected to stop mammography screening.    Review of Systems  Constitutional, neuro, ENT, endocrine, pulmonary, cardiac, gastrointestinal, genitourinary, musculoskeletal, integument and psychiatric systems are negative, except as otherwise noted.     OBJECTIVE:   /70 (BP Location: Right arm, Cuff Size: Adult Regular)   Pulse 64   Temp 98.7  F (37.1  C) (Tympanic)   Resp 16   Ht 1.613 m (5' 3.5\")   Wt 75 kg (165 lb 6.4 oz)   SpO2 98%   BMI 28.84 kg/m   Estimated body mass index is 28.84 kg/m  as calculated from the following:    Height as of this encounter: 1.613 m (5' 3.5\").    Weight as of this encounter: 75 kg (165 lb 6.4 oz).  Physical Exam  GENERAL APPEARANCE: healthy, alert and no distress  EYES: Eyes grossly normal to inspection, PERRL and conjunctivae and sclerae normal  HENT: ear canals and TM's normal, nose and mouth without ulcers or lesions, oropharynx clear and oral mucous membranes moist  NECK: no adenopathy, no asymmetry, masses, or scars and thyroid normal to palpation  RESP: lungs clear to auscultation - no rales, rhonchi or wheezes  BREAST: normal without masses, tenderness or nipple discharge and no palpable axillary masses or adenopathy  CV: " "regular rate and rhythm, normal S1 S2, no S3 or S4, no murmur, click or rub, no peripheral edema and peripheral pulses strong  ABDOMEN: soft, nontender, no hepatosplenomegaly, no masses and bowel sounds normal  MS: no musculoskeletal defects are noted and gait is age appropriate without ataxia  SKIN: no suspicious lesions or rashes  NEURO: Normal strength and tone, sensory exam grossly normal, mentation intact and speech normal  PSYCH: mentation appears normal and affect normal/bright    EKG: (read and interpreted by me) sinus bradycardia.  ASSESSMENT / PLAN:   1. Well adult exam    2. Postmenopausal status  Due for DEXA  - DX Hip/Pelvis/Spine; Future    3. Palpitations  EKG shows sinus bradycardia. Discussed holter for further evaluation and management if ongoing symptoms.  - EKG 12-lead complete w/read - Clinics    4. Hyperlipidemia LDL goal <130  - Lipid panel reflex to direct LDL Fasting    5. Osteoarthritis, unspecified osteoarthritis type, unspecified site      End of Life Planning:  Patient currently has an advanced directive: No.  I have verified the patient's ablity to prepare an advanced directive/make health care decisions.  Literature was provided to assist patient in preparing an advanced directive.    COUNSELING:  Reviewed preventive health counseling, as reflected in patient instructions    BP Readings from Last 1 Encounters:   04/25/19 130/70     Estimated body mass index is 28.84 kg/m  as calculated from the following:    Height as of this encounter: 1.613 m (5' 3.5\").    Weight as of this encounter: 75 kg (165 lb 6.4 oz).    BP Screening:   Last 3 BP Readings:    BP Readings from Last 3 Encounters:   04/25/19 130/70   04/16/19 142/80   12/20/18 130/80       The following was recommended to the patient:  Re-screen BP within a year and recommended lifestyle modifications       reports that she quit smoking about 37 years ago. Her smoking use included cigarettes. She quit after 20.00 years of use. She " has never used smokeless tobacco.      Appropriate preventive services were discussed with this patient, including applicable screening as appropriate for cardiovascular disease, diabetes, osteopenia/osteoporosis, and glaucoma.  As appropriate for age/gender, discussed screening for colorectal cancer, prostate cancer, breast cancer, and cervical cancer. Checklist reviewing preventive services available has been given to the patient.    Reviewed patients plan of care and provided an AVS. The Basic Care Plan (routine screening as documented in Health Maintenance) for Marlene meets the Care Plan requirement. This Care Plan has been established and reviewed with the Patient.    Counseling Resources:  ATP IV Guidelines  Pooled Cohorts Equation Calculator  Breast Cancer Risk Calculator  FRAX Risk Assessment  ICSI Preventive Guidelines  Dietary Guidelines for Americans, 2010  USDA's MyPlate  ASA Prophylaxis  Lung CA Screening    Katy Rodriguez MD  [facility]    Identified Health Risks:

## 2019-04-25 NOTE — PATIENT INSTRUCTIONS
Our Clinic hours are:  Mondays    7:20 am - 7 pm  Tues -  Fri  7:20 am - 5 pm    Clinic Phone: [phone]    The clinic lab opens at 7:30 am Mon - Fri and appointments are required.    [facility]. [phone]  Monday  8 am - 7pm  Tues - Fri 8 am - 5:30 pm         Patient Education

## 2019-04-26 DIAGNOSIS — E78.5 HYPERLIPIDEMIA LDL GOAL <130: ICD-10-CM

## 2019-04-26 RX ORDER — ATORVASTATIN CALCIUM 40 MG/1
40 TABLET, FILM COATED ORAL DAILY
Qty: 90 TABLET | Refills: 3 | Status: SHIPPED | OUTPATIENT
Start: 2019-04-26 | End: 2020-10-06

## 2019-04-26 NOTE — RESULT ENCOUNTER NOTE
Notified via LDR Holdingt: Cholesterol significantly elevated. I would recommend starting cholesterol medication to reduce risk of heart attack, stroke and peripheral vascular disease. Prescription for lipitor faxed to your pharmacy.      The 10-year ASCVD risk score (Claudine SCHMIDT Jr., et al., 2013) is: 18.4%    Values used to calculate the score:      Age: 78 years      Sex: Female      Is Non- : No      Diabetic: No      Tobacco smoker: No      Systolic Blood Pressure: 130 mmHg      Is BP treated: No      HDL Cholesterol: 54 mg/dL      Total Cholesterol: 225 mg/dL

## 2019-10-01 ENCOUNTER — HEALTH MAINTENANCE LETTER (OUTPATIENT)
Age: 79
End: 2019-10-01

## 2020-02-06 ENCOUNTER — TRANSFERRED RECORDS (OUTPATIENT)
Dept: HEALTH INFORMATION MANAGEMENT | Facility: CLINIC | Age: 80
End: 2020-02-06

## 2020-02-25 NOTE — TELEPHONE ENCOUNTER
Medication is being filled for 1 time refill only due to:  Patient needs labs lipids.  Sent message with script to pharmacy and mailed letter to home.            36.9

## 2020-07-07 ENCOUNTER — OFFICE VISIT (OUTPATIENT)
Dept: DERMATOLOGY | Facility: CLINIC | Age: 80
End: 2020-07-07
Payer: COMMERCIAL

## 2020-07-07 VITALS — HEART RATE: 69 BPM | DIASTOLIC BLOOD PRESSURE: 78 MMHG | SYSTOLIC BLOOD PRESSURE: 148 MMHG | OXYGEN SATURATION: 97 %

## 2020-07-07 DIAGNOSIS — D18.01 ANGIOMA OF SKIN: ICD-10-CM

## 2020-07-07 DIAGNOSIS — L82.1 SEBORRHEIC KERATOSIS: Primary | ICD-10-CM

## 2020-07-07 DIAGNOSIS — L81.4 LENTIGO: ICD-10-CM

## 2020-07-07 DIAGNOSIS — L82.0 INFLAMED SEBORRHEIC KERATOSIS: ICD-10-CM

## 2020-07-07 PROCEDURE — 99203 OFFICE O/P NEW LOW 30 MIN: CPT | Mod: 25 | Performed by: DERMATOLOGY

## 2020-07-07 PROCEDURE — 17111 DESTRUCTION B9 LESIONS 15/>: CPT | Performed by: DERMATOLOGY

## 2020-07-07 NOTE — PATIENT INSTRUCTIONS
For face:  -Retinol  -Vitamin C    WOUND CARE INSTRUCTIONS   FOR CRYOSURGERY   This area treated with liquid nitrogen should form a blister (areas treated may or may not blister-skin may just turn dark and slough off). You do not need to bandage the area unless a blister forms and breaks (which may be a few days). When the blister breaks, begin daily dressing changes as follows:  1) Clean and dry the area with tap water using clean Q-tip or sterile gauze pad.   2) Apply Polysporin ointment or Bacitracin ointment over entire wound. Do NOT use Neosporin ointment.   3) Cover the wound with a band-aid or sterile non-stick gauze pad and micropore paper tape.   REPEAT THESE INSTRUCTIONS AT LEAST ONCE A DAY UNTIL THE WOUND HAS COMPLETELY HEALED.   It is an old wives tale that a wound heals better when it is exposed to air and allowed to dry out. The wound will heal faster with a better cosmetic result if it is kept moist with ointment and covered with a bandage.   Do not let the wound dry out.   IMPORTANT INFORMATION ON REVERSE SIDE   Supplies Needed:   *Cotton tipped applicators (Q-tips)   *Polysporin ointment or Bacitracin ointment (NOT NEOSPORIN)   *Band-aids, or non stick gauze pads and micropore paper tape   PATIENT INFORMATION   During the healing process you will notice a number of changes. All wounds develop a small halo of redness surrounding the wound. This means healing is occurring. Severe itching with extensive redness usually indicates sensitivity to the ointment or bandage tape used to dress the wound. You should call our office if this develops.   Swelling and/or discoloration around your surgical site is common, particularly when performed around the eye.   All wounds normally drain. The larger the wound the more drainage there will be. After 7-10 days, you will notice the wound beginning to shrink and new skin will begin to grow. The wound is healed when you can see skin has formed over the entire area. A  healed wound has a healthy, shiny look to the surface and is red to dark pink in color to normalize. Wounds may take approximately 4-6 weeks to heal. Larger wounds may take 6-8 weeks. After the wound is healed you may discontinue dressing changes.   You may experience a sensation of tightness as your wound heals. This is normal and will gradually subside.   Your healed wound may be sensitive to temperature changes. This sensitivity improves with time, but if you re having a lot of discomfort, try to avoid temperature extremes.   Patients frequently experience itching after their wound appears to have healed because of the continue healing under the skin. Plain Vaseline will help relieve the itching.

## 2020-07-07 NOTE — LETTER
7/7/2020         RE: Marlene Millan  [address] [facility] [place] [address]        Dear Colleague,    Thank you for referring your patient, Marlene Millan, to the [facility]. Please see a copy of my visit note below.    Malrene Millan is a 80 year old year old female patient here today for spots on face and neck.   .  Patient states this has been present for a while.  Patient reports the following symptoms:  itching.  Patient reports the following previous treatments none.  These treatments did not work.  Patient reports the following modifying factors none.  Associated symptoms: none.  Patient has no other skin complaints today.  Remainder of the HPI, Meds, PMH, Allergies, FH, and SH was reviewed in chart.      Past Medical History:   Diagnosis Date     Basal cell carcinoma      Hypertension 6/7/2010     Macular degeneration      PONV (postoperative nausea and vomiting)      Pulmonary embolism (H)      Pulmonary embolism, bilateral (H) 2/19/2012    Post-surgical at [facility] following total knee replacement      Shingles outbreak December 2016       Past Surgical History:   Procedure Laterality Date     basal cell cancer of nose  Oct 2012     BREAST SURGERY      breast reduction     CARPAL TUNNEL RELEASE RT/LT      right     COMBINED REPAIR PTOSIS WITH BLEPHAROPLASTY BILATERAL Bilateral 6/30/2015    Procedure: COMBINED REPAIR PTOSIS WITH BLEPHAROPLASTY BILATERAL;  Surgeon: Ilir Marvin MD;  Location: [facility]     HC REMOVAL GALLBLADDER       LIGATE VEIN VARICOSE, PHLEBECTOMY MULTIPLE STAB, COMBINED Bilateral 8/13/2015    Procedure: COMBINED LIGATE VEIN VARICOSE, PHLEBECTOMY MULTIPLE STAB;  Surgeon: Alphonse Pro MD;  Location: [facility]     ORTHOPEDIC SURGERY      bilateral knee     PHACOEMULSIFICATION WITH STANDARD INTRAOCULAR LENS IMPLANT Right 12/21/2017    Procedure: PHACOEMULSIFICATION WITH STANDARD INTRAOCULAR LENS IMPLANT;  Right Cataract Removal with  Implant;  Surgeon: Clif Michel MD;  Location: [facility]     SURGICAL HISTORY OF -       breast reduction mammoplasty      SURGICAL HISTORY OF -       bilateral lower extremity vein stripping     SURGICAL HISTORY OF -   2010    left total knee arthroplasty        Family History   Problem Relation Age of Onset     Diabetes Father         last both legs to the disease     Heart Disease Father          age 91       Social History     Socioeconomic History     Marital status:      Spouse name: Not on file     Number of children: Not on file     Years of education: Not on file     Highest education level: Not on file   Occupational History     Not on file   Social Needs     Financial resource strain: Not on file     Food insecurity     Worry: Not on file     Inability: Not on file     Transportation needs     Medical: Not on file     Non-medical: Not on file   Tobacco Use     Smoking status: Former Smoker     Years: 20.00     Types: Cigarettes     Last attempt to quit: 1982     Years since quittin.4     Smokeless tobacco: Never Used   Substance and Sexual Activity     Alcohol use: Yes     Comment: occ wine     Drug use: No     Sexual activity: Not Currently   Lifestyle     Physical activity     Days per week: Not on file     Minutes per session: Not on file     Stress: Not on file   Relationships     Social connections     Talks on phone: Not on file     Gets together: Not on file     Attends [employer]ism service: Not on file     Active member of club or organization: Not on file     Attends meetings of clubs or organizations: Not on file     Relationship status: Not on file     Intimate partner violence     Fear of current or ex partner: Not on file     Emotionally abused: Not on file     Physically abused: Not on file     Forced sexual activity: Not on file   Other Topics Concern     Parent/sibling w/ CABG, MI or angioplasty before 65F 55M? No   Social History Narrative     Not on file        Outpatient Encounter Medications as of 7/7/2020   Medication Sig Dispense Refill     atorvastatin (LIPITOR) 40 MG tablet Take 1 tablet (40 mg) by mouth daily 90 tablet 3     ibuprofen (ADVIL/MOTRIN) 800 MG tablet Take 1 tablet (800 mg) by mouth every 8 hours as needed for moderate pain 30 tablet 1     MULTIPLE VITAMINS PO Take by mouth daily Chewable - 2 of them each day       Multiple Vitamins-Minerals (HAIR SKIN AND NAILS FORMULA PO) Take 2 chew tab by mouth       Multiple Vitamins-Minerals (MACULAR VITAMIN BENEFIT PO) Take 1 tablet by mouth daily       omeprazole (PRILOSEC) 20 MG DR capsule Take 1 capsule (20 mg) by mouth daily 1/2 hour prior to main meal (Patient not taking: Reported on 7/7/2020) 90 capsule 1     No facility-administered encounter medications on file as of 7/7/2020.              Review Of Systems  Skin: As above  Eyes: negative  Ears/Nose/Throat: negative  Respiratory: No shortness of breath, dyspnea on exertion, cough, or hemoptysis  Cardiovascular: negative  Gastrointestinal: negative  Genitourinary: negative  Musculoskeletal: negative  Neurologic: negative  Psychiatric: negative  Hematologic/Lymphatic/Immunologic: negative  Endocrine: negative      O:   NAD, WDWN, Alert & Oriented, Mood & Affect wnl, Vitals stable   Here today alone   BP (!) 148/78   Pulse 69   SpO2 97%    General appearance normal   Vitals stable   Alert, oriented and in no acute distress        Stuck on papules and brown macules on trunk and ext   Red papules on trunk  Inflamed seborrheic keratosis on face and neck      The remainder of expanded problem focused exam was normal; the following areas were examined:  scalp/hair, conjunctiva/lids, face, neck, lips, chest, digits/nails, RUE, LUE.      Eyes: Conjunctivae/lids:Normal     ENT: Lips, buccal mucosa, tongue: normal    MSK:Normal    Cardiovascular: peripheral edema none    Pulm: Breathing Normal    Neuro/Psych: Orientation:Alert and Orientedx3 ;  Mood/Affect:normal       A/P:  1. Seborrheic keratosis, lentigo, angioma,   2. Inflamed seborrheic keratosis   Neck x10   Face x5  LN2:  Treated with LN2 for 5s for 1-2 cycles. Warned risks of blistering, pain, pigment change, scarring, and incomplete resolution.  Advised patient to return if lesions do not completely resolve.  Wound care sheet given.    BENIGN LESIONS DISCUSSED WITH PATIENT:  I discussed the specifics of tumor, prognosis, and genetics of benign lesions.  I explained that treatment of these lesions would be purely cosmetic and not medically neccessary.  I discussed with patient different removal options including excision, cautery and /or laser.      Nature and genetics of benign skin lesions dicussed with patient.  Signs and Symptoms of skin cancer discussed with patient.  Patient encouraged to perform monthly skin exams.  UV precautions reviewed with patient.  Patient to follow up with Primary Care provider regarding elevated blood pressure.  Skin care regimen reviewed with patient: Eliminate harsh soaps, i.e. Dial, zest, irsih spring; Mild soaps such as Cetaphil or Dove sensitive skin, avoid hot or cold showers, aggressive use of emollients including vanicream, cetaphil or cerave discussed with patient.    Risks of non-melanoma skin cancer discussed with patient   Return to clinic 6 months      Again, thank you for allowing me to participate in the care of your patient.        Sincerely,        Errol Anderson MD

## 2020-07-07 NOTE — PROGRESS NOTES
Marlene Millan is a 80 year old year old female patient here today for spots on face and neck.   .  Patient states this has been present for a while.  Patient reports the following symptoms:  itching.  Patient reports the following previous treatments none.  These treatments did not work.  Patient reports the following modifying factors none.  Associated symptoms: none.  Patient has no other skin complaints today.  Remainder of the HPI, Meds, PMH, Allergies, FH, and SH was reviewed in chart.      Past Medical History:   Diagnosis Date     Basal cell carcinoma      Hypertension 6/7/2010     Macular degeneration      PONV (postoperative nausea and vomiting)      Pulmonary embolism (H)      Pulmonary embolism, bilateral (H) 2/19/2012    Post-surgical at [facility] following total knee replacement      Shingles outbreak December 2016       Past Surgical History:   Procedure Laterality Date     basal cell cancer of nose  Oct 2012     BREAST SURGERY      breast reduction     CARPAL TUNNEL RELEASE RT/LT      right     COMBINED REPAIR PTOSIS WITH BLEPHAROPLASTY BILATERAL Bilateral 6/30/2015    Procedure: COMBINED REPAIR PTOSIS WITH BLEPHAROPLASTY BILATERAL;  Surgeon: Ilir Marvin MD;  Location: [facility]     HC REMOVAL GALLBLADDER       LIGATE VEIN VARICOSE, PHLEBECTOMY MULTIPLE STAB, COMBINED Bilateral 8/13/2015    Procedure: COMBINED LIGATE VEIN VARICOSE, PHLEBECTOMY MULTIPLE STAB;  Surgeon: Alphonse Pro MD;  Location: [facility]     ORTHOPEDIC SURGERY      bilateral knee     PHACOEMULSIFICATION WITH STANDARD INTRAOCULAR LENS IMPLANT Right 12/21/2017    Procedure: PHACOEMULSIFICATION WITH STANDARD INTRAOCULAR LENS IMPLANT;  Right Cataract Removal with Implant;  Surgeon: Clif Michel MD;  Location: [facility]     SURGICAL HISTORY OF -       breast reduction mammoplasty 1990s     SURGICAL HISTORY OF -       bilateral lower extremity vein stripping     SURGICAL HISTORY OF -   2/22/2010    left total knee  arthroplasty        Family History   Problem Relation Age of Onset     Diabetes Father         last both legs to the disease     Heart Disease Father          age 91       Social History     Socioeconomic History     Marital status:      Spouse name: Not on file     Number of children: Not on file     Years of education: Not on file     Highest education level: Not on file   Occupational History     Not on file   Social Needs     Financial resource strain: Not on file     Food insecurity     Worry: Not on file     Inability: Not on file     Transportation needs     Medical: Not on file     Non-medical: Not on file   Tobacco Use     Smoking status: Former Smoker     Years: 20.00     Types: Cigarettes     Last attempt to quit: 1982     Years since quittin.4     Smokeless tobacco: Never Used   Substance and Sexual Activity     Alcohol use: Yes     Comment: occ wine     Drug use: No     Sexual activity: Not Currently   Lifestyle     Physical activity     Days per week: Not on file     Minutes per session: Not on file     Stress: Not on file   Relationships     Social connections     Talks on phone: Not on file     Gets together: Not on file     Attends Taoism service: Not on file     Active member of club or organization: Not on file     Attends meetings of clubs or organizations: Not on file     Relationship status: Not on file     Intimate partner violence     Fear of current or ex partner: Not on file     Emotionally abused: Not on file     Physically abused: Not on file     Forced sexual activity: Not on file   Other Topics Concern     Parent/sibling w/ CABG, MI or angioplasty before 65F 55M? No   Social History Narrative     Not on file       Outpatient Encounter Medications as of 2020   Medication Sig Dispense Refill     atorvastatin (LIPITOR) 40 MG tablet Take 1 tablet (40 mg) by mouth daily 90 tablet 3     ibuprofen (ADVIL/MOTRIN) 800 MG tablet Take 1 tablet (800 mg) by mouth every 8  hours as needed for moderate pain 30 tablet 1     MULTIPLE VITAMINS PO Take by mouth daily Chewable - 2 of them each day       Multiple Vitamins-Minerals (HAIR SKIN AND NAILS FORMULA PO) Take 2 chew tab by mouth       Multiple Vitamins-Minerals (MACULAR VITAMIN BENEFIT PO) Take 1 tablet by mouth daily       omeprazole (PRILOSEC) 20 MG DR capsule Take 1 capsule (20 mg) by mouth daily 1/2 hour prior to main meal (Patient not taking: Reported on 7/7/2020) 90 capsule 1     No facility-administered encounter medications on file as of 7/7/2020.              Review Of Systems  Skin: As above  Eyes: negative  Ears/Nose/Throat: negative  Respiratory: No shortness of breath, dyspnea on exertion, cough, or hemoptysis  Cardiovascular: negative  Gastrointestinal: negative  Genitourinary: negative  Musculoskeletal: negative  Neurologic: negative  Psychiatric: negative  Hematologic/Lymphatic/Immunologic: negative  Endocrine: negative      O:   NAD, WDWN, Alert & Oriented, Mood & Affect wnl, Vitals stable   Here today alone   BP (!) 148/78   Pulse 69   SpO2 97%    General appearance normal   Vitals stable   Alert, oriented and in no acute distress        Stuck on papules and brown macules on trunk and ext   Red papules on trunk  Inflamed seborrheic keratosis on face and neck      The remainder of expanded problem focused exam was normal; the following areas were examined:  scalp/hair, conjunctiva/lids, face, neck, lips, chest, digits/nails, RUE, LUE.      Eyes: Conjunctivae/lids:Normal     ENT: Lips, buccal mucosa, tongue: normal    MSK:Normal    Cardiovascular: peripheral edema none    Pulm: Breathing Normal    Neuro/Psych: Orientation:Alert and Orientedx3 ; Mood/Affect:normal       A/P:  1. Seborrheic keratosis, lentigo, angioma,   2. Inflamed seborrheic keratosis   Neck x10   Face x5  LN2:  Treated with LN2 for 5s for 1-2 cycles. Warned risks of blistering, pain, pigment change, scarring, and incomplete resolution.  Advised  patient to return if lesions do not completely resolve.  Wound care sheet given.    BENIGN LESIONS DISCUSSED WITH PATIENT:  I discussed the specifics of tumor, prognosis, and genetics of benign lesions.  I explained that treatment of these lesions would be purely cosmetic and not medically neccessary.  I discussed with patient different removal options including excision, cautery and /or laser.      Nature and genetics of benign skin lesions dicussed with patient.  Signs and Symptoms of skin cancer discussed with patient.  Patient encouraged to perform monthly skin exams.  UV precautions reviewed with patient.  Patient to follow up with Primary Care provider regarding elevated blood pressure.  Skin care regimen reviewed with patient: Eliminate harsh soaps, i.e. Dial, zest, irsih spring; Mild soaps such as Cetaphil or Dove sensitive skin, avoid hot or cold showers, aggressive use of emollients including vanicream, cetaphil or cerave discussed with patient.    Risks of non-melanoma skin cancer discussed with patient   Return to clinic 6 months

## 2020-07-08 ENCOUNTER — ANCILLARY PROCEDURE (OUTPATIENT)
Dept: MAMMOGRAPHY | Facility: CLINIC | Age: 80
End: 2020-07-08
Attending: FAMILY MEDICINE
Payer: COMMERCIAL

## 2020-07-08 DIAGNOSIS — Z12.31 VISIT FOR SCREENING MAMMOGRAM: ICD-10-CM

## 2020-07-08 PROCEDURE — 77067 SCR MAMMO BI INCL CAD: CPT | Mod: TC

## 2020-08-27 ENCOUNTER — TRANSFERRED RECORDS (OUTPATIENT)
Dept: HEALTH INFORMATION MANAGEMENT | Facility: CLINIC | Age: 80
End: 2020-08-27

## 2020-10-05 ENCOUNTER — TELEPHONE (OUTPATIENT)
Dept: FAMILY MEDICINE | Facility: CLINIC | Age: 80
End: 2020-10-05

## 2020-10-05 DIAGNOSIS — E78.5 HYPERLIPIDEMIA LDL GOAL <130: ICD-10-CM

## 2020-10-06 RX ORDER — ATORVASTATIN CALCIUM 40 MG/1
TABLET, FILM COATED ORAL
Qty: 90 TABLET | Refills: 3 | Status: SHIPPED | OUTPATIENT
Start: 2020-10-06 | End: 2020-10-06

## 2020-10-06 RX ORDER — ATORVASTATIN CALCIUM 40 MG/1
40 TABLET, FILM COATED ORAL DAILY
Qty: 30 TABLET | Refills: 0 | COMMUNITY
Start: 2020-10-06 | End: 2020-10-21

## 2020-10-06 NOTE — TELEPHONE ENCOUNTER
"Requested Prescriptions   Pending Prescriptions Disp Refills     atorvastatin (LIPITOR) 40 MG tablet [Pharmacy Med Name: ATORVASTATIN CALCIUM 40MG TABS] 90 tablet 3     Sig: TAKE ONE TABLET BY MOUTH ONCE DAILY       Statins Protocol Failed - 10/5/2020 11:42 AM        Failed - LDL on file in past 12 months     Recent Labs   Lab Test 04/25/19  1219   *             Passed - No abnormal creatine kinase in past 12 months     No lab results found.             Passed - Recent (12 mo) or future (30 days) visit within the authorizing provider's specialty     Patient has had an office visit with the authorizing provider or a provider within the authorizing providers department within the previous 12 mos or has a future within next 30 days. See \"Patient Info\" tab in inbasket, or \"Choose Columns\" in Meds & Orders section of the refill encounter.              Passed - Medication is active on med list        Passed - Patient is age 18 or older        Passed - No active pregnancy on record        Passed - No positive pregnancy test in past 12 months             "

## 2020-10-06 NOTE — TELEPHONE ENCOUNTER
Inadvertently approved. It's be 1 1/2 years since she was last seen. Due for office visit. Please contact pharmacy and change prescription to 30 day supply and schedule her for office visit.

## 2020-10-06 NOTE — TELEPHONE ENCOUNTER
"I called our pharmacy here, spoke with [facility] and made the change to 30 days supply only on her atorvasttin.     Tried to call Janee to come in for appt and fasting labs, and unable to leave a message:     \"sorry that mailbox is full\"     Domenica Carrington RNC    "

## 2020-10-06 NOTE — TELEPHONE ENCOUNTER
Routing refill request to provider for review/approval because:  Labs not current:  LDL    LDL Cholesterol Calculated   Date Value Ref Range Status   04/25/2019 149 (H) <100 mg/dL Final     Comment:     Above desirable:  100-129 mg/dl  Borderline High:  130-159 mg/dL  High:             160-189 mg/dL  Very high:       >189 mg/dl       Bing Campbell RN

## 2020-10-21 ENCOUNTER — OFFICE VISIT (OUTPATIENT)
Dept: FAMILY MEDICINE | Facility: CLINIC | Age: 80
End: 2020-10-21
Payer: COMMERCIAL

## 2020-10-21 VITALS
RESPIRATION RATE: 16 BRPM | HEART RATE: 63 BPM | DIASTOLIC BLOOD PRESSURE: 68 MMHG | WEIGHT: 171 LBS | TEMPERATURE: 97.9 F | HEIGHT: 64 IN | OXYGEN SATURATION: 99 % | BODY MASS INDEX: 29.19 KG/M2 | SYSTOLIC BLOOD PRESSURE: 128 MMHG

## 2020-10-21 DIAGNOSIS — E78.5 HYPERLIPIDEMIA LDL GOAL <130: ICD-10-CM

## 2020-10-21 DIAGNOSIS — M85.88 OTHER SPECIFIED DISORDERS OF BONE DENSITY AND STRUCTURE, OTHER SITE: ICD-10-CM

## 2020-10-21 DIAGNOSIS — Z00.00 ENCOUNTER FOR MEDICARE ANNUAL WELLNESS EXAM: Primary | ICD-10-CM

## 2020-10-21 DIAGNOSIS — K21.9 GASTROESOPHAGEAL REFLUX DISEASE, UNSPECIFIED WHETHER ESOPHAGITIS PRESENT: ICD-10-CM

## 2020-10-21 DIAGNOSIS — Z13.0 SCREENING FOR ENDOCRINE, METABOLIC AND IMMUNITY DISORDER: ICD-10-CM

## 2020-10-21 DIAGNOSIS — Z23 NEED FOR PROPHYLACTIC VACCINATION AND INOCULATION AGAINST INFLUENZA: ICD-10-CM

## 2020-10-21 DIAGNOSIS — Z13.228 SCREENING FOR ENDOCRINE, METABOLIC AND IMMUNITY DISORDER: ICD-10-CM

## 2020-10-21 DIAGNOSIS — R20.2 PARESTHESIAS: ICD-10-CM

## 2020-10-21 DIAGNOSIS — Z13.29 SCREENING FOR ENDOCRINE, METABOLIC AND IMMUNITY DISORDER: ICD-10-CM

## 2020-10-21 LAB
ANION GAP SERPL CALCULATED.3IONS-SCNC: 5 MMOL/L (ref 3–14)
BUN SERPL-MCNC: 20 MG/DL (ref 7–30)
CALCIUM SERPL-MCNC: 9.5 MG/DL (ref 8.5–10.1)
CHLORIDE SERPL-SCNC: 106 MMOL/L (ref 94–109)
CHOLEST SERPL-MCNC: 203 MG/DL
CO2 SERPL-SCNC: 30 MMOL/L (ref 20–32)
CREAT SERPL-MCNC: 0.91 MG/DL (ref 0.52–1.04)
GFR SERPL CREATININE-BSD FRML MDRD: 60 ML/MIN/{1.73_M2}
GLUCOSE SERPL-MCNC: 102 MG/DL (ref 70–99)
HDLC SERPL-MCNC: 61 MG/DL
LDLC SERPL CALC-MCNC: 111 MG/DL
NONHDLC SERPL-MCNC: 142 MG/DL
POTASSIUM SERPL-SCNC: 4.2 MMOL/L (ref 3.4–5.3)
SODIUM SERPL-SCNC: 141 MMOL/L (ref 133–144)
TRIGL SERPL-MCNC: 155 MG/DL

## 2020-10-21 PROCEDURE — 36415 COLL VENOUS BLD VENIPUNCTURE: CPT | Performed by: FAMILY MEDICINE

## 2020-10-21 PROCEDURE — 80061 LIPID PANEL: CPT | Performed by: FAMILY MEDICINE

## 2020-10-21 PROCEDURE — G0008 ADMIN INFLUENZA VIRUS VAC: HCPCS | Performed by: FAMILY MEDICINE

## 2020-10-21 PROCEDURE — 90662 IIV NO PRSV INCREASED AG IM: CPT | Performed by: FAMILY MEDICINE

## 2020-10-21 PROCEDURE — 80048 BASIC METABOLIC PNL TOTAL CA: CPT | Performed by: FAMILY MEDICINE

## 2020-10-21 PROCEDURE — 99397 PER PM REEVAL EST PAT 65+ YR: CPT | Mod: 25 | Performed by: FAMILY MEDICINE

## 2020-10-21 RX ORDER — ATORVASTATIN CALCIUM 40 MG/1
40 TABLET, FILM COATED ORAL DAILY
Qty: 90 TABLET | Refills: 4 | Status: SHIPPED | OUTPATIENT
Start: 2020-10-21 | End: 2022-02-09

## 2020-10-21 ASSESSMENT — PAIN SCALES - GENERAL: PAINLEVEL: NO PAIN (0)

## 2020-10-21 ASSESSMENT — MIFFLIN-ST. JEOR: SCORE: 1232.71

## 2020-10-21 NOTE — PATIENT INSTRUCTIONS
Shingrix is the new shingles vaccine. It is typically a pharmacy benefit under most insurances. The [facility] pharmacy can check your insurance and give it if it's covered and you don't need an appointment to do this.     Patient Education   Personalized Prevention Plan  You are due for the preventive services outlined below.  Your care team is available to assist you in scheduling these services.  If you have already completed any of these items, please share that information with your care team to update in your medical record.  Health Maintenance Due   Topic Date Due     Osteoporosis Screening  1940     Zoster (Shingles) Vaccine (1 of 2) 06/19/1992     Discuss Advance Care Planning  08/07/2019     PHQ-2  01/01/2020     Annual Wellness Visit  04/25/2020     FALL RISK ASSESSMENT  04/25/2020     Flu Vaccine (1) 09/01/2020           Our Clinic hours are:  Mondays    7:20 am - 7 pm  Tues -  Fri  7:20 am - 5 pm    Clinic Phone: [phone]    The clinic lab opens at 7:30 am Mon - Fri and appointments are required.    [facility]  Ph. [phone]  Monday  8 am - 7pm  Tues - Fri 8 am - 5:30 pm

## 2020-10-21 NOTE — PROGRESS NOTES
"3  SUBJECTIVE:   Marlene Millan is a 80 year old female who presents for Preventive Visit.      Patient has been advised of split billing requirements and indicates understanding: Yes  Are you in the first 12 months of your Medicare Part B coverage?  No    Chief Complaint   Patient presents with     Physical     Discuss Shingrix     Numbness     /pain that goes through her head on going off and on since the 1980's and now is more frequent, maybe 3-4 times per month.     Gastrophageal Reflux     still has acid reflux     Flu Shot         Physical Health:    In general, how would you rate your overall physical health? excellent    Outside of work, how many days during the week do you exercise? 4-5 days/week    Outside of work, approximately how many minutes a day do you exercise?30-45 minutes    If you drink alcohol do you typically have >3 drinks per day or >7 drinks per week? No    Do you usually eat at least 4 servings of fruit and vegetables a day, include whole grains & fiber and avoid regularly eating high fat or \"junk\" foods? NO    Do you have any problems taking medications regularly?  No    Do you have any side effects from medications? none    Needs assistance for the following daily activities: no assistance needed    Which of the following safety concerns are present in your home?  throw rugs in the hallway and lack of grab bars in the bathroom     Hearing impairment: Yes, Difficulty following a conversation in a noisy restaurant or crowded room.    In the past 6 months, have you been bothered by leaking of urine? no    Mental Health:    In general, how would you rate your overall mental or emotional health? excellent  PHQ-2 Score:      Do you feel safe in your environment? Yes    Have you ever done Advance Care Planning? (For example, a Health Directive, POLST, or a discussion with a medical provider or your loved ones about your wishes): No, advance care planning information given to patient to review. "  Patient plans to discuss their wishes with loved ones or provider.      Additional concerns to address?      Chief Complaint   Patient presents with     Physical     Discuss Shingrix     Numbness     /pain that goes through her head on going off and on since the  and now is more frequent, maybe 3-4 times per month.     Gastrophageal Reflux     still has acid reflux     Flu Shot        Fall risk:  Fallen 2 or more times in the past year?: No  Any fall with injury in the past year?: No    Cognitive Screenin) Repeat 3 items (Leader, Season, Table)    2) Clock draw: NORMAL  3) 3 item recall: Recalls 3 objects  Results: 3 items recalled: COGNITIVE IMPAIRMENT LESS LIKELY    Mini-CogTM Copyright S Romina. Licensed by the author for use in [facility]; reprinted with permission (aura@[facility]). All rights reserved.      Do you have sleep apnea, excessive snoring or daytime drowsiness?: no          Reviewed and updated as needed this visit by clinical staff  Tobacco  Allergies    Med Hx  Surg Hx  Fam Hx  Soc Hx        Reviewed and updated as needed this visit by Provider                Social History     Tobacco Use     Smoking status: Former Smoker     Years: 20.00     Types: Cigarettes     Quit date: 1982     Years since quittin.7     Smokeless tobacco: Never Used   Substance Use Topics     Alcohol use: Yes     Comment: occ wine                           Current providers sharing in care for this patient include:   Patient Care Team:  Katy Rodriguez MD as PCP - General (Family Practice)  Katy Rodriguez MD as Assigned PCP    The following health maintenance items are reviewed in Epic and correct as of today:  Health Maintenance   Topic Date Due     DEXA  1940     ZOSTER IMMUNIZATION (1 of 2) 1992     ADVANCE CARE PLANNING  2019     PHQ-2  2020     MEDICARE ANNUAL WELLNESS VISIT  2020     FALL RISK ASSESSMENT  2020     INFLUENZA  VACCINE (1) 09/01/2020     COLORECTAL CANCER SCREENING  12/12/2022     LIPID  04/25/2024     DTAP/TDAP/TD IMMUNIZATION (2 - Td) 12/13/2027     Pneumococcal Vaccine: 65+ Years  Completed     Pneumococcal Vaccine: Pediatrics (0 to 5 Years) and At-Risk Patients (6 to 64 Years)  Aged Out     IPV IMMUNIZATION  Aged Out     MENINGITIS IMMUNIZATION  Aged Out     HEPATITIS B IMMUNIZATION  Aged Out     Labs reviewed in EPIC  Patient Active Problem List   Diagnosis     Macular degeneration     Spinal Stenosis of Lumbar Region     HYPERLIPIDEMIA LDL GOAL <130     GERD (gastroesophageal reflux disease)     Patent foramen ovale     Advanced directives, counseling/discussion     Health Care Home     ASD (atrial septal defect)     Senile nuclear cataract     Past Surgical History:   Procedure Laterality Date     basal cell cancer of nose  Oct 2012     BREAST SURGERY      breast reduction     CARPAL TUNNEL RELEASE RT/LT      right     COMBINED REPAIR PTOSIS WITH BLEPHAROPLASTY BILATERAL Bilateral 6/30/2015    Procedure: COMBINED REPAIR PTOSIS WITH BLEPHAROPLASTY BILATERAL;  Surgeon: Ilir Marvin MD;  Location: [facility]     HC REMOVAL GALLBLADDER       LIGATE VEIN VARICOSE, PHLEBECTOMY MULTIPLE STAB, COMBINED Bilateral 8/13/2015    Procedure: COMBINED LIGATE VEIN VARICOSE, PHLEBECTOMY MULTIPLE STAB;  Surgeon: Alphonse Pro MD;  Location: [facility]     ORTHOPEDIC SURGERY      bilateral knee     PHACOEMULSIFICATION WITH STANDARD INTRAOCULAR LENS IMPLANT Right 12/21/2017    Procedure: PHACOEMULSIFICATION WITH STANDARD INTRAOCULAR LENS IMPLANT;  Right Cataract Removal with Implant;  Surgeon: Clif Michel MD;  Location: [facility]     SURGICAL HISTORY OF -       breast reduction mammoplasty 1990s     SURGICAL HISTORY OF -       bilateral lower extremity vein stripping     SURGICAL HISTORY OF -   2/22/2010    left total knee arthroplasty       Social History     Tobacco Use     Smoking status: Former Smoker     Years: 20.00      "Types: Cigarettes     Quit date: 1982     Years since quittin.7     Smokeless tobacco: Never Used   Substance Use Topics     Alcohol use: Yes     Comment: occ wine     Family History   Problem Relation Age of Onset     Diabetes Father         last both legs to the disease     Heart Disease Father          age 91         Current Outpatient Medications   Medication Sig Dispense Refill     ASPIRIN 81 PO Take 1 tablet by mouth daily       atorvastatin (LIPITOR) 40 MG tablet Take 1 tablet (40 mg) by mouth daily 90 tablet 4     Cholecalciferol (VITAMIN D3) 25 MCG (1000 UT) CAPS Take 1 capsule by mouth daily       FIBER ADULT GUMMIES PO Take 2 chew tab by mouth daily       ibuprofen (ADVIL/MOTRIN) 800 MG tablet Take 1 tablet (800 mg) by mouth every 8 hours as needed for moderate pain 30 tablet 1     MULTIPLE VITAMINS PO Take by mouth daily Chewable - 2 of them each day       Multiple Vitamins-Minerals (HAIR SKIN AND NAILS FORMULA PO) Take 2 chew tab by mouth       Multiple Vitamins-Minerals (MACULAR VITAMIN BENEFIT PO) Take 1 tablet by mouth daily       omeprazole (PRILOSEC) 20 MG DR capsule Take 1 capsule (20 mg) by mouth daily 1/2 hour prior to main meal 90 capsule 4     Allergies   Allergen Reactions     Sulfa Drugs      Affected eyesight       ROS:  Constitutional, neuro, ENT, endocrine, pulmonary, cardiac, gastrointestinal, genitourinary, musculoskeletal, integument and psychiatric systems are negative, except as otherwise noted.     OBJECTIVE:   There were no vitals taken for this visit. Estimated body mass index is 28.84 kg/m  as calculated from the following:    Height as of 19: 1.613 m (5' 3.5\").    Weight as of 19: 75 kg (165 lb 6.4 oz).  EXAM:   GENERAL APPEARANCE: healthy, alert and no distress  EYES: Eyes grossly normal to inspection, PERRL and conjunctivae and sclerae normal  HENT: ear canals and TM's normal  NECK: no adenopathy, no asymmetry, masses, or scars and thyroid normal to " palpation  RESP: lungs clear to auscultation - no rales, rhonchi or wheezes  BREAST: normal without masses, tenderness or nipple discharge and no palpable axillary masses or adenopathy  CV: regular rate and rhythm, normal S1 S2, no S3 or S4, no murmur, click or rub, no peripheral edema and peripheral pulses strong  ABDOMEN: soft, nontender, no hepatosplenomegaly, no masses and bowel sounds normal  MS: no musculoskeletal defects are noted and gait is age appropriate without ataxia  SKIN: no suspicious lesions or rashes  NEURO: Normal strength and tone, sensory exam grossly normal, mentation intact and speech normal  PSYCH: mentation appears normal and affect normal/bright    Diagnostic Test Results:  Labs reviewed in Epic    ASSESSMENT / PLAN:   1. Encounter for Medicare annual wellness exam    2. Paresthesias  ?TMJ vs neuralgia vs other. No associated neurological symptoms. Brief, rare, self limited and longstanding episodes. Advised monitoring. Follow-up if increasing in frequency or new associated symptoms.    3. Hyperlipidemia LDL goal <130  Well controlled. Refilled medication. Check labs.    - atorvastatin (LIPITOR) 40 MG tablet; Take 1 tablet (40 mg) by mouth daily  Dispense: 90 tablet; Refill: 4  - Lipid panel reflex to direct LDL Fasting    4. Gastroesophageal reflux disease, unspecified whether esophagitis present  Well controlled. Refilled medication.     - omeprazole (PRILOSEC) 20 MG DR capsule; Take 1 capsule (20 mg) by mouth daily 1/2 hour prior to main meal  Dispense: 90 capsule; Refill: 4    5. Need for prophylactic vaccination and inoculation against influenza  - FLUZONE HIGH DOSE 65+  [27612]  - ADMIN INFLUENZA (For MEDICARE Patients ONLY) []    6. Screening for endocrine, metabolic and immunity disorder  - Basic metabolic panel    7. Other specified disorders of bone density and structure, other site  - DX Hip/Pelvis/Spine; Future    Patient has been advised of split billing requirements and  "indicates understanding: Yes    COUNSELING:  Reviewed preventive health counseling, as reflected in patient instructions    Estimated body mass index is 28.84 kg/m  as calculated from the following:    Height as of 4/25/19: 1.613 m (5' 3.5\").    Weight as of 4/25/19: 75 kg (165 lb 6.4 oz).    Weight management plan: Discussed healthy diet and exercise guidelines    She reports that she quit smoking about 38 years ago. Her smoking use included cigarettes. She quit after 20.00 years of use. She has never used smokeless tobacco.    Appropriate preventive services were discussed with this patient, including applicable screening as appropriate for cardiovascular disease, diabetes, osteopenia/osteoporosis, and glaucoma.  As appropriate for age/gender, discussed screening for colorectal cancer, prostate cancer, breast cancer, and cervical cancer. Checklist reviewing preventive services available has been given to the patient.    Reviewed patients plan of care and provided an AVS. The Intermediate Care Plan ( asthma action plan, low back pain action plan, and migraine action plan) for Marlene meets the Care Plan requirement. This Care Plan has been established and reviewed with the Patient.    Counseling Resources:  ATP IV Guidelines  Pooled Cohorts Equation Calculator  Breast Cancer Risk Calculator  BRCA-Related Cancer Risk Assessment: FHS-7 Tool  FRAX Risk Assessment  ICSI Preventive Guidelines  Dietary Guidelines for Americans, 2010  USDA's MyPlate  ASA Prophylaxis  Lung CA Screening    Katy Rodriguez MD  [facility]  "

## 2020-10-22 NOTE — RESULT ENCOUNTER NOTE
Notified via [employer]: Cholesterol mildly elevated.  Not abnormal enough at this point to warrant medication changes but I would recommend: increasing daily exercise, increasing dietary fiber, eliminating trans fats and reducing saturated fats. Cholesterol mildly elevated.  Not abnormal enough at this point to warrant medication changes but I would recommend: increasing daily exercise, increasing dietary fiber, eliminating trans fats and reducing saturated fats.  Otherwise labs look good.

## 2020-11-18 ENCOUNTER — HOSPITAL ENCOUNTER (OUTPATIENT)
Dept: BONE DENSITY | Facility: CLINIC | Age: 80
Discharge: HOME | End: 2020-11-18
Attending: FAMILY MEDICINE | Admitting: FAMILY MEDICINE
Payer: COMMERCIAL

## 2020-11-18 DIAGNOSIS — M85.88 OTHER SPECIFIED DISORDERS OF BONE DENSITY AND STRUCTURE, OTHER SITE: ICD-10-CM

## 2020-11-18 PROCEDURE — 77080 DXA BONE DENSITY AXIAL: CPT

## 2020-11-18 NOTE — RESULT ENCOUNTER NOTE
Notified via [email or web address]: Bone density scan shows osteopenia (thin bones) but not osteoporosis. I would recommend optimizing calcium and vitamin D. I would recommend 1500 mg of calcium daily along with 1000 international units of vitamin D daily. No indication for additional treatment this time.

## 2020-12-17 ENCOUNTER — TRANSFERRED RECORDS (OUTPATIENT)
Dept: HEALTH INFORMATION MANAGEMENT | Facility: CLINIC | Age: 80
End: 2020-12-17

## 2021-02-11 ENCOUNTER — TRANSFERRED RECORDS (OUTPATIENT)
Dept: HEALTH INFORMATION MANAGEMENT | Facility: CLINIC | Age: 81
End: 2021-02-11

## 2021-03-01 ENCOUNTER — IMMUNIZATION (OUTPATIENT)
Dept: FAMILY MEDICINE | Facility: CLINIC | Age: 81
End: 2021-03-01
Payer: COMMERCIAL

## 2021-03-01 PROCEDURE — 91300 PR COVID VAC PFIZER DIL RECON 30 MCG/0.3 ML IM: CPT

## 2021-03-01 PROCEDURE — 0001A PR COVID VAC PFIZER DIL RECON 30 MCG/0.3 ML IM: CPT

## 2021-03-22 ENCOUNTER — IMMUNIZATION (OUTPATIENT)
Dept: FAMILY MEDICINE | Facility: CLINIC | Age: 81
End: 2021-03-22
Attending: FAMILY MEDICINE
Payer: COMMERCIAL

## 2021-03-22 PROCEDURE — 0002A PR COVID VAC PFIZER DIL RECON 30 MCG/0.3 ML IM: CPT

## 2021-03-22 PROCEDURE — 91300 PR COVID VAC PFIZER DIL RECON 30 MCG/0.3 ML IM: CPT

## 2021-06-02 ENCOUNTER — RECORDS - HEALTHEAST (OUTPATIENT)
Dept: ADMINISTRATIVE | Facility: CLINIC | Age: 81
End: 2021-06-02

## 2021-09-04 ENCOUNTER — HEALTH MAINTENANCE LETTER (OUTPATIENT)
Age: 81
End: 2021-09-04

## 2021-11-23 ENCOUNTER — VIRTUAL VISIT (OUTPATIENT)
Dept: FAMILY MEDICINE | Facility: CLINIC | Age: 81
End: 2021-11-23
Payer: COMMERCIAL

## 2021-11-23 ENCOUNTER — LAB (OUTPATIENT)
Dept: FAMILY MEDICINE | Facility: CLINIC | Age: 81
End: 2021-11-23
Attending: NURSE PRACTITIONER
Payer: COMMERCIAL

## 2021-11-23 DIAGNOSIS — Z20.822 SUSPECTED 2019 NOVEL CORONAVIRUS INFECTION: ICD-10-CM

## 2021-11-23 DIAGNOSIS — Z20.822 SUSPECTED 2019 NOVEL CORONAVIRUS INFECTION: Primary | ICD-10-CM

## 2021-11-23 PROCEDURE — 99213 OFFICE O/P EST LOW 20 MIN: CPT | Mod: 95 | Performed by: NURSE PRACTITIONER

## 2021-11-23 PROCEDURE — U0005 INFEC AGEN DETEC AMPLI PROBE: HCPCS

## 2021-11-23 PROCEDURE — U0003 INFECTIOUS AGENT DETECTION BY NUCLEIC ACID (DNA OR RNA); SEVERE ACUTE RESPIRATORY SYNDROME CORONAVIRUS 2 (SARS-COV-2) (CORONAVIRUS DISEASE [COVID-19]), AMPLIFIED PROBE TECHNIQUE, MAKING USE OF HIGH THROUGHPUT TECHNOLOGIES AS DESCRIBED BY CMS-2020-01-R: HCPCS

## 2021-11-23 NOTE — PROGRESS NOTES
Janee is a 81 year old who is being evaluated via a billable video visit.    TEXT PTS PHONE FOR VISIT  How would you like to obtain your AVS? MyChart  If the video visit is dropped, the invitation should be resent by: Text to cell phone: [phone] Will anyone else be joining your video visit? No      Video Start Time: 11:06 AM    Assessment & Plan     Suspected 2019 novel coronavirus infection  Symptoms consistent with COVID 19. With positive exposure I recommend testing.   - Symptomatic COVID-19 Virus (Coronavirus) by PCR Nose; Future         Return if symptoms worsen or fail to improve.    NOBLE Mendez CNP  [facility]   Janee is a 81 year old who presents for the following health issues     HPI       Concern for COVID-19/pt was exposed to covid on 11/20/21 and 11/21/21 is having 11/22/21 fatigue, headache, throat drainage and sore, cough, body aches, wants testing.    About how many days ago did these symptoms start? 11/22/21  Is this your first visit for this illness? Yes  In the 14 days before your symptoms started, have you had close contact with someone with COVID-19 (Coronavirus)? Yes, I have been in contact with someone who has COVID-19/Coronavirus (confirmed by lab test).  Do you have a fever or chills? No  Are you having new or worsening difficulty breathing? Yes   Please describe what kind of difficulty you are having breathing:Mild dyspnea (able to do ADLs without difficulty, mild shortness of breath with activities such as climbing one or two flights of stairs or walking briskly)  Do you have new or worsening cough? Yes, it's a dry cough.   Have you had any new or unexplained body aches? YES    Have you experienced any of the following NEW symptoms?    Headache: YES    Sore throat: YES    Loss of taste or smell: No    Chest pain: No    Diarrhea: loose    Rash: No  What treatments have you tried? Zinc, vitamin C, vitamin D3   Who do you live with?  self  Are you, or a household member, a healthcare worker or a ? No  Do you live in a nursing home, group home, or shelter? No  Do you have a way to get food/medications if quarantined? Yes, I have a friend or family member who can help me.              Review of Systems         Objective         Vitals:  No vitals were obtained today due to virtual visit.    Physical Exam   GENERAL: Healthy, alert and no distress  EYES: Eyes grossly normal to inspection.  No discharge or erythema, or obvious scleral/conjunctival abnormalities.  RESP: No audible wheeze, cough, or visible cyanosis.  No visible retractions or increased work of breathing.    SKIN: Visible skin clear. No significant rash, abnormal pigmentation or lesions.  NEURO: Cranial nerves grossly intact.  Mentation and speech appropriate for age.  PSYCH: Mentation appears normal, affect normal/bright, judgement and insight intact, normal speech and appearance well-groomed.                Video-Visit Details    Type of service:  telephone visit.   Telephone visit length: 10  Video End Time:unable to do telephone visit.     Originating Location (pt. Location): Home    Distant Location (provider location):  [facility]     Platform used for Video Visit: Unable to complete video visit

## 2021-11-24 ENCOUNTER — TELEPHONE (OUTPATIENT)
Dept: SURGERY | Facility: CLINIC | Age: 81
End: 2021-11-24
Payer: COMMERCIAL

## 2021-11-24 ENCOUNTER — TELEPHONE (OUTPATIENT)
Dept: FAMILY MEDICINE | Facility: CLINIC | Age: 81
End: 2021-11-24
Payer: COMMERCIAL

## 2021-11-24 LAB — SARS-COV-2 RNA RESP QL NAA+PROBE: POSITIVE

## 2021-11-24 NOTE — TELEPHONE ENCOUNTER
Coronavirus (COVID-19) Notification     Reason for call  Patient requesting results     Lab Result    Lab test 2019-nCoV rRt-PCR in process        RN Recommendations/Instructions per [facility]  Continue quarantee and following instructions until you receive the results     Please Contact your PCP clinic or return to the Emergency department if your:    Symptoms worsen or other concerning symptom's.     Patient informed that if test for COVID19 is POSITIVE,  you will receive a call typically within 48 hours from the test date (date lab collected).  If NEGATIVE result, you will receive a letter in the mail or PWAhart.      Ella Sanders LPN

## 2021-11-24 NOTE — TELEPHONE ENCOUNTER
Patient is looking for her COVID results. Her internet is down and she doesn't remember her MyChart log in.      Nursing action:  Patient was advised that this is still in process.  She was given the COVID results line to call for theses results.  Thank you. Heydi Gil R.N.

## 2021-11-25 NOTE — TELEPHONE ENCOUNTER
"-Coronavirus (COVID-19) Notification    Caller Name (Patient, parent, daughter/son, grandparent, etc)  Patient     Reason for call  Notify of Positive Coronavirus (COVID-19) lab results, assess symptoms,  review  [employer] [place] recommendations    Lab Result    Lab test:  2019-nCoV rRt-PCR or SARS-CoV-2 PCR    Oropharyngeal AND/OR nasopharyngeal swabs is POSITIVE for 2019-nCoV RNA/SARS-COV-2 PCR (COVID-19 virus)    RN Recommendations/Instructions per [facility] Coronavirus COVID-19 recommendations    Brief introduction script  Introduce self then review script:  \"I am calling on behalf of [employer].  We were notified that your Coronavirus test (COVID-19) for was POSITIVE for the virus.  I have some information to relay to you but first I wanted to mention that the [place] Dept of Health will be contacting you shortly [it's possible MD already called Patient] to talk to you more about how you are feeling and other people you have had contact with who might now also have the virus.  Also,  [employer] [place] is Partnering with the [facility] for Covid-19 research, you may be contacted directly by research staff.\"    Assessment (Inquire about Patient's current symptoms)   Assessment   Current Symptoms at time of phone call: (if no symptoms, document No symptoms] Nasal stuffiness, slight cough   Symptoms onset (if applicable) 2 days ago     If at time of call, Patients symptoms hare worsened, the Patient should contact 911 or have someone drive them to Emergency Dept promptly:      If Patient calling 911, inform 911 personal that you have tested positive for the Coronavirus (COVID-19).  Place mask on and await 911 to arrive.    If Emergency Dept, If possible, please have another adult drive you to the Emergency Dept but you need to wear mask when in contact with other people.      Monoclonal Antibody Administration    You may be eligible to receive a new treatment with a monoclonal antibody for preventing " "hospitalization in patients at high risk for complications from COVID-19.   This medication is still experimental and available on a limited basis; it is given through an IV and must be given at an infusion center. Please note that not all people who are eligible will receive the medication since it is in limited supply.     Are you interested in being considered for this medication?  No.   Does the patient fit the criteria: Patient declined    If patient qualifies based on above criteria:  \"You will be contacted if you are selected to receive this treatment in the next 1-2 business days.   This is time sensitive and if you are not selected in the next 1-2 business days, you will not receive the medication.  If you do not receive a call to schedule, you have not been selected.\"      Review information with Patient    Your result was positive. This means you have COVID-19 (coronavirus).  We have sent you a letter that reviews the information that I'll be reviewing with you now.    How can I protect others?    If you have symptoms: stay home and away from others (self-isolate) until:    You've had no fever--and no medicine that reduces fever--for 1 full day (24 hours). And       Your other symptoms have gotten better. For example, your cough or breathing has improved. And     At least 10 days have passed since your symptoms started. (If you've been told by a doctor that you have a weak immune system, wait 20 days.)     If you don't have symptoms: Stay home and away from others (self-isolate) until at least 10 days have passed since your first positive COVID-19 test. (Date test collected)    During this time:    Stay in your own room, including for meals. Use your own bathroom if you can.    Stay away from others in your home. No hugging, kissing or shaking hands. No visitors.     Don't go to work, school or anywhere else.     Clean  high touch  surfaces often (doorknobs, counters, handles, etc.). Use a household " cleaning spray or wipes. You'll find a full list on the EPA website at www.epa.gov/pesticide-registration/list-n-disinfectants-use-against-sars-cov-2.     Cover your mouth and nose with a mask, tissue or other face covering to avoid spreading germs.    Wash your hands and face often with soap and water.    Make a list of people you have been in close contact with recently, even if either of you wore a face covering.   ; Start your list from 2 days before you became ill or had a positive test.  ; Include anyone that was within 6 feet of you for a cumulative total of 15 minutes or more in 24 hours. (Example: if you sat next to Jamarcus for 5 minutes in the morning and 10 minutes in the afternoon, then you were in close contact for 15 minutes total that day. Jamarcus would be added to your list.)    A public health worker will call or text you. It is important that you answer. They will ask you questions about possible exposures to COVID-19, such as people you have been in direct contact with and places you have visited.    Tell the people on your list that you have COVID-19; they should stay away from others for 14 days starting from the last time they were in contact with you (unless you are told something different from a public health worker).     Caregivers in these groups are at risk for severe illness due to COVID-19:  o People 65 years and older  o People who live in a nursing home or long-term care facility  o People with chronic disease (lung, heart, cancer, diabetes, kidney, liver, immunologic)  o People who have a weakened immune system, including those who:  - Are in cancer treatment  - Take medicine that weakens the immune system, such as corticosteroids  - Had a bone marrow or organ transplant  - Have an immune deficiency  - Have poorly controlled HIV or AIDS  - Are obese (body mass index of 40 or higher)  - Smoke regularly    Caregivers should wear gloves while washing dishes, handling laundry and cleaning  bedrooms and bathrooms.    Wash and dry laundry with special caution. Don't shake dirty laundry, and use the warmest water setting you can.    If you have a weakened immune system, ask your doctor about other actions you should take.    For more tips, go to www.cdc.gov/coronavirus/2019-ncov/downloads/10Things.pdf.    You should not go back to work until you meet the guidelines above for ending your home isolation. You don't need to be retested for COVID-19 before going back to work--studies show that you won't spread the virus if it's been at least 10 days since your symptoms started (or 20 days, if you have a weak immune system).    Employers: This document serves as formal notice of your employee's medical guidelines for going back to work. They must meet the above guidelines before going back to work in person.    How can I take care of myself?    1. Get lots of rest. Drink extra fluids (unless a doctor has told you not to).    2. Take Tylenol (acetaminophen) for fever or pain. If you have liver or kidney problems, ask your family doctor if it's okay to take Tylenol.     Take either:     650 mg (two 325 mg pills) every 4 to 6 hours, or     1,000 mg (two 500 mg pills) every 8 hours as needed.     Note: Don't take more than 3,000 mg in one day. Acetaminophen is found in many medicines (both prescribed and over-the-counter medicines). Read all labels to be sure you don't take too much.    For children, check the Tylenol bottle for the right dose (based on their age or weight).    3. If you have other health problems (like cancer, heart failure, an organ transplant or severe kidney disease): Call your specialty clinic if you don't feel better in the next 2 days.    4. Know when to call 911: Emergency warning signs include:    Trouble breathing or shortness of breath    Pain or pressure in the chest that doesn't go away    Feeling confused like you haven't felt before, or not being able to wake up    Bluish-colored  lips or face    5. Sign up for DocDoc. We know it's scary to hear that you have COVID-19. We want to track your symptoms to make sure you're okay over the next 2 weeks. Please look for an email from DocDoc--this is a free, online program that we'll use to keep in touch. To sign up, follow the link in the email. Learn more at [email or web address].    Where can I get more information?    [facility]: [email or web address]    Coronavirus Basics: www.health.[facility].[place]./diseases/coronavirus/basics.html    What to Do If You're Sick: www.cdc.gov/coronavirus/2019-ncov/about/steps-when-sick.html    Ending Home Isolation: www.cdc.gov/coronavirus/2019-ncov/hcp/disposition-in-home-patients.html     Caring for Someone with COVID-19: www.cdc.gov/coronavirus/2019-ncov/if-you-are-sick/care-for-someone.html     [facility] clinical trials (COVID-19 research studies): clinicalaffairs.[facility]-clinical-trials     A Positive COVID-19 letter will be sent via giddy or the mail. (Exception, no letters sent to Presurgerical/Preprocedure Patients)    Manuela Araiza LPN

## 2021-11-30 ENCOUNTER — HOSPITAL ENCOUNTER (EMERGENCY)
Facility: CLINIC | Age: 81
Discharge: HOME | End: 2021-11-30
Attending: NURSE PRACTITIONER | Admitting: NURSE PRACTITIONER
Payer: COMMERCIAL

## 2021-11-30 ENCOUNTER — NURSE TRIAGE (OUTPATIENT)
Dept: FAMILY MEDICINE | Facility: CLINIC | Age: 81
End: 2021-11-30
Payer: COMMERCIAL

## 2021-11-30 ENCOUNTER — APPOINTMENT (OUTPATIENT)
Dept: CT IMAGING | Facility: CLINIC | Age: 81
End: 2021-11-30
Attending: FAMILY MEDICINE
Payer: COMMERCIAL

## 2021-11-30 VITALS
HEIGHT: 64 IN | SYSTOLIC BLOOD PRESSURE: 186 MMHG | DIASTOLIC BLOOD PRESSURE: 92 MMHG | TEMPERATURE: 98.1 F | HEART RATE: 61 BPM | WEIGHT: 168 LBS | BODY MASS INDEX: 28.68 KG/M2 | OXYGEN SATURATION: 98 %

## 2021-11-30 DIAGNOSIS — J12.82 PNEUMONIA DUE TO 2019 NOVEL CORONAVIRUS: ICD-10-CM

## 2021-11-30 DIAGNOSIS — U07.1 PNEUMONIA DUE TO 2019 NOVEL CORONAVIRUS: ICD-10-CM

## 2021-11-30 LAB
ALBUMIN SERPL-MCNC: 3.7 G/DL (ref 3.4–5)
ALP SERPL-CCNC: 108 U/L (ref 40–150)
ALT SERPL W P-5'-P-CCNC: 31 U/L (ref 0–50)
ANION GAP SERPL CALCULATED.3IONS-SCNC: 7 MMOL/L (ref 3–14)
AST SERPL W P-5'-P-CCNC: 24 U/L (ref 0–45)
BASOPHILS # BLD AUTO: 0 10E3/UL (ref 0–0.2)
BASOPHILS NFR BLD AUTO: 0 %
BILIRUB SERPL-MCNC: 0.4 MG/DL (ref 0.2–1.3)
BUN SERPL-MCNC: 23 MG/DL (ref 7–30)
CALCIUM SERPL-MCNC: 9.4 MG/DL (ref 8.5–10.1)
CHLORIDE BLD-SCNC: 102 MMOL/L (ref 94–109)
CO2 SERPL-SCNC: 30 MMOL/L (ref 20–32)
CREAT SERPL-MCNC: 0.87 MG/DL (ref 0.52–1.04)
EOSINOPHIL # BLD AUTO: 0.1 10E3/UL (ref 0–0.7)
EOSINOPHIL NFR BLD AUTO: 2 %
ERYTHROCYTE [DISTWIDTH] IN BLOOD BY AUTOMATED COUNT: 15.5 % (ref 10–15)
GFR SERPL CREATININE-BSD FRML MDRD: 64 ML/MIN/1.73M2
GLUCOSE BLD-MCNC: 90 MG/DL (ref 70–99)
HCT VFR BLD AUTO: 38 % (ref 35–47)
HGB BLD-MCNC: 13.3 G/DL (ref 11.7–15.7)
IMM GRANULOCYTES # BLD: 0 10E3/UL
IMM GRANULOCYTES NFR BLD: 0 %
LIPASE SERPL-CCNC: 79 U/L (ref 73–393)
LYMPHOCYTES # BLD AUTO: 1.8 10E3/UL (ref 0.8–5.3)
LYMPHOCYTES NFR BLD AUTO: 31 %
MCH RBC QN AUTO: 34.5 PG (ref 26.5–33)
MCHC RBC AUTO-ENTMCNC: 35 G/DL (ref 31.5–36.5)
MCV RBC AUTO: 98 FL (ref 78–100)
MONOCYTES # BLD AUTO: 0.5 10E3/UL (ref 0–1.3)
MONOCYTES NFR BLD AUTO: 8 %
NEUTROPHILS # BLD AUTO: 3.5 10E3/UL (ref 1.6–8.3)
NEUTROPHILS NFR BLD AUTO: 59 %
NRBC # BLD AUTO: 0 10E3/UL
NRBC BLD AUTO-RTO: 0 /100
PLATELET # BLD AUTO: 266 10E3/UL (ref 150–450)
POTASSIUM BLD-SCNC: 3.7 MMOL/L (ref 3.4–5.3)
PROT SERPL-MCNC: 8.1 G/DL (ref 6.8–8.8)
RBC # BLD AUTO: 3.86 10E6/UL (ref 3.8–5.2)
SODIUM SERPL-SCNC: 139 MMOL/L (ref 133–144)
WBC # BLD AUTO: 5.9 10E3/UL (ref 4–11)

## 2021-11-30 PROCEDURE — 99285 EMERGENCY DEPT VISIT HI MDM: CPT | Mod: 25 | Performed by: NURSE PRACTITIONER

## 2021-11-30 PROCEDURE — 85025 COMPLETE CBC W/AUTO DIFF WBC: CPT | Performed by: FAMILY MEDICINE

## 2021-11-30 PROCEDURE — 99285 EMERGENCY DEPT VISIT HI MDM: CPT | Performed by: NURSE PRACTITIONER

## 2021-11-30 PROCEDURE — 36415 COLL VENOUS BLD VENIPUNCTURE: CPT | Performed by: FAMILY MEDICINE

## 2021-11-30 PROCEDURE — 250N000009 HC RX 250: Performed by: FAMILY MEDICINE

## 2021-11-30 PROCEDURE — 71275 CT ANGIOGRAPHY CHEST: CPT

## 2021-11-30 PROCEDURE — 83690 ASSAY OF LIPASE: CPT | Performed by: FAMILY MEDICINE

## 2021-11-30 PROCEDURE — 82040 ASSAY OF SERUM ALBUMIN: CPT | Performed by: FAMILY MEDICINE

## 2021-11-30 PROCEDURE — 250N000011 HC RX IP 250 OP 636: Performed by: FAMILY MEDICINE

## 2021-11-30 RX ORDER — IOPAMIDOL 755 MG/ML
74 INJECTION, SOLUTION INTRAVASCULAR ONCE
Status: COMPLETED | OUTPATIENT
Start: 2021-11-30 | End: 2021-11-30

## 2021-11-30 RX ADMIN — SODIUM CHLORIDE 100 ML: 9 INJECTION, SOLUTION INTRAVENOUS at 16:35

## 2021-11-30 RX ADMIN — IOPAMIDOL 74 ML: 755 INJECTION, SOLUTION INTRAVENOUS at 16:34

## 2021-11-30 ASSESSMENT — MIFFLIN-ST. JEOR: SCORE: 1222.04

## 2021-11-30 NOTE — TELEPHONE ENCOUNTER
Patient notified and now agrees to ER for evaluation.     Uma Chapman RN  [facility]

## 2021-11-30 NOTE — DISCHARGE INSTRUCTIONS
Monitor your oxygen levels and be seen if they drop below 90%.  I referred you to the get well loop who will help you monitor your symptoms.  Proceed with your monoclonal antibody infusion.  Return to be seen if you have increased pain, increased difficulty breathing, or not able to take fluids or have other worrisome symptoms.

## 2021-11-30 NOTE — ED PROVIDER NOTES
History     Chief Complaint   Patient presents with     Shortness of Breath     HPI  Marlene Millan is a 81 year old female who comes in with chest pain.  She developed symptoms of Covid infection 1 week ago.  She was immunized but has not yet had her booster.  She was exposed to multiple family members with Covid.  She tested positive on 11/23.  She initially declined monoclonal antibody infusion but is decided to get it and is due to get it tomorrow.  She comes in now because this morning she had an episode of pain in the right side of her chest in the mid axillary line and the level of the mid ribs.  The symptom has now resolved but she still concerned that it could represent a PE because she has had a history of PE in the past.  She just had a single episode many years ago and is not currently on anticoagulation.  He has never smoked.  She has no identified lung disease.  She has no identified chronic medical problems.  She takes no prescription medications.  She takes some over-the-counter vitamins but otherwise nothing else.  He has had a cough since she developed the Covid infection she has had some URI symptoms of congestion with mild sore throat but is taking food and fluids adequately.  She is not vomiting.  She is not having bowel or bladder symptoms.  She had chest heaviness yesterday lasting a few hours but it resolved and has not recurred.    Allergies:  Allergies   Allergen Reactions     Sulfa Drugs      Affected eyesight       Problem List:    Patient Active Problem List    Diagnosis Date Noted     Senile nuclear cataract 12/19/2017     Priority: Medium     ASD (atrial septal defect) 12/14/2017     Priority: Medium     Health Care Home 12/12/2012     Priority: Medium     Jazmin Morgan RN-PHN  FPA / ASHLEY [facility]   [phone]    DX V65.8 REPLACED WITH 09540 HEALTH CARE HOME (04/08/2013)       Advanced directives, counseling/discussion 06/11/2012     Priority: Medium      Received 2007         Patent foramen ovale 02/27/2012     Priority: Medium     Incidental finding on transesophageal echocardiogram       GERD (gastroesophageal reflux disease) 02/25/2011     Priority: Medium     Controlled by diet       HYPERLIPIDEMIA LDL GOAL <130 10/31/2010     Priority: Medium     Macular degeneration 07/14/2008     Priority: Medium     Left eye       Spinal Stenosis of Lumbar Region 07/14/2008     Priority: Medium        Past Medical History:    Past Medical History:   Diagnosis Date     Basal cell carcinoma      Hypertension 6/7/2010     Macular degeneration      PONV (postoperative nausea and vomiting)      Pulmonary embolism (H)      Pulmonary embolism, bilateral (H) 2/19/2012     Shingles outbreak December 2016       Past Surgical History:    Past Surgical History:   Procedure Laterality Date     basal cell cancer of nose  Oct 2012     BREAST SURGERY      breast reduction     CARPAL TUNNEL RELEASE RT/LT      right     COMBINED REPAIR PTOSIS WITH BLEPHAROPLASTY BILATERAL Bilateral 6/30/2015    Procedure: COMBINED REPAIR PTOSIS WITH BLEPHAROPLASTY BILATERAL;  Surgeon: Ilir Marvin MD;  Location: [facility]     HC REMOVAL GALLBLADDER       LIGATE VEIN VARICOSE, PHLEBECTOMY MULTIPLE STAB, COMBINED Bilateral 8/13/2015    Procedure: COMBINED LIGATE VEIN VARICOSE, PHLEBECTOMY MULTIPLE STAB;  Surgeon: Alphonse Pro MD;  Location: [facility] OR     ORTHOPEDIC SURGERY      bilateral knee     PHACOEMULSIFICATION WITH STANDARD INTRAOCULAR LENS IMPLANT Right 12/21/2017    Procedure: PHACOEMULSIFICATION WITH STANDARD INTRAOCULAR LENS IMPLANT;  Right Cataract Removal with Implant;  Surgeon: Clif Michel MD;  Location: [facility] OR     SURGICAL HISTORY OF -       breast reduction mammoplasty 1990s     SURGICAL HISTORY OF -       bilateral lower extremity vein stripping     SURGICAL HISTORY OF -   2/22/2010    left total knee arthroplasty       Family History:    Family History   Problem Relation Age of  "Onset     Diabetes Father         last both legs to the disease     Heart Disease Father          age 91       Social History:  Marital Status:   [5]  Social History     Tobacco Use     Smoking status: Former Smoker     Years: 20.00     Types: Cigarettes     Quit date: 1982     Years since quittin.8     Smokeless tobacco: Never Used   Vaping Use     Vaping Use: Never used   Substance Use Topics     Alcohol use: Yes     Comment: occ wine     Drug use: No        Medications:    ASPIRIN 81 PO  atorvastatin (LIPITOR) 40 MG tablet  Cholecalciferol (VITAMIN D3) 25 MCG (1000 UT) CAPS  FIBER ADULT GUMMIES PO  ibuprofen (ADVIL/MOTRIN) 800 MG tablet  MULTIPLE VITAMINS PO  Multiple Vitamins-Minerals (HAIR SKIN AND NAILS FORMULA PO)  Multiple Vitamins-Minerals (MACULAR VITAMIN BENEFIT PO)  omeprazole (PRILOSEC) 20 MG DR capsule      Review of Systems  All other systems are reviewed and are negative    Physical Exam   BP: (!) 170/90  Pulse: 71  Temp: 98.1  F (36.7  C)  Height: 162.6 cm (5' 4\")  Weight: 76.2 kg (168 lb)  SpO2: 97 %      Physical Exam    Nursing note and vitals were reviewed.  Constitutional: Awake and alert, adequately nourished and developed appearing 81-year-old in no apparent discomfort, who does not appear acutely ill, and who answers questions appropriately and cooperates with examination.  HEENT: EOMI.   Neck: Freely mobile.  Cardiovascular: Cardiac examination reveals normal heart rate and regular rhythm without murmur.  Pulmonary/Chest: Breathing is unlabored.  Breath sounds are clear and equal bilaterally.  There no retractions, tachypnea, rales, wheezes, or rhonchi.  Chest wall is nontender to palpation.  Abdomen: Soft, nontender, no HSM or masses rebound or guarding.  Musculoskeletal: Extremities are warm and well-perfused and without edema  Neurological: Alert, oriented, thought content logical, coherent   Skin: Warm, dry, no rashes.  Psychiatric: Affect broad and " appropriate.      ED Course        Procedures              Critical Care time:  none               Results for orders placed or performed during the hospital encounter of 11/30/21 (from the past 24 hour(s))   CBC with platelets differential    Narrative    The following orders were created for panel order CBC with platelets differential.  Procedure                               Abnormality         Status                     ---------                               -----------         ------                     CBC with platelets and d...[[id number]]  Abnormal            Final result                 Please view results for these tests on the individual orders.   Comprehensive metabolic panel   Result Value Ref Range    Sodium 139 133 - 144 mmol/L    Potassium 3.7 3.4 - 5.3 mmol/L    Chloride 102 94 - 109 mmol/L    Carbon Dioxide (CO2) 30 20 - 32 mmol/L    Anion Gap 7 3 - 14 mmol/L    Urea Nitrogen 23 7 - 30 mg/dL    Creatinine 0.87 0.52 - 1.04 mg/dL    Calcium 9.4 8.5 - 10.1 mg/dL    Glucose 90 70 - 99 mg/dL    Alkaline Phosphatase 108 40 - 150 U/L    AST 24 0 - 45 U/L    ALT 31 0 - 50 U/L    Protein Total 8.1 6.8 - 8.8 g/dL    Albumin 3.7 3.4 - 5.0 g/dL    Bilirubin Total 0.4 0.2 - 1.3 mg/dL    GFR Estimate 64 >60 mL/min/1.73m2   Lipase   Result Value Ref Range    Lipase 79 73 - 393 U/L   CBC with platelets and differential   Result Value Ref Range    WBC Count 5.9 4.0 - 11.0 10e3/uL    RBC Count 3.86 3.80 - 5.20 10e6/uL    Hemoglobin 13.3 11.7 - 15.7 g/dL    Hematocrit 38.0 35.0 - 47.0 %    MCV 98 78 - 100 fL    MCH 34.5 (H) 26.5 - 33.0 pg    MCHC 35.0 31.5 - 36.5 g/dL    RDW 15.5 (H) 10.0 - 15.0 %    Platelet Count 266 150 - 450 10e3/uL    % Neutrophils 59 %    % Lymphocytes 31 %    % Monocytes 8 %    % Eosinophils 2 %    % Basophils 0 %    % Immature Granulocytes 0 %    NRBCs per 100 WBC 0 <1 /100    Absolute Neutrophils 3.5 1.6 - 8.3 10e3/uL    Absolute Lymphocytes 1.8 0.8 - 5.3 10e3/uL    Absolute Monocytes 0.5  0.0 - 1.3 10e3/uL    Absolute Eosinophils 0.1 0.0 - 0.7 10e3/uL    Absolute Basophils 0.0 0.0 - 0.2 10e3/uL    Absolute Immature Granulocytes 0.0 <=0.4 10e3/uL    Absolute NRBCs 0.0 10e3/uL   CT Chest Pulmonary Embolism w Contrast    Narrative    CT CHEST PULMONARY EMBOLISM WITH CONTRAST 11/30/2021 4:51 PM    CLINICAL HISTORY: Pleuritic chest pain, Covid infection, history of PE  TECHNIQUE: CT angiogram chest during arterial phase injection IV  contrast. 2D and 3D MIP reconstructions were performed by the CT  technologist. Dose reduction techniques were used.     CONTRAST: 74 mL Isovue 370    COMPARISON: None.    FINDINGS:  ANGIOGRAM CHEST: Pulmonary arteries are normal caliber and negative  for pulmonary emboli. Thoracic aorta is normal in caliber and negative  for dissection.    LUNGS AND PLEURA: There are the few peripheral groundglass opacities  in the lungs. No pleural effusion.    MEDIASTINUM/AXILLAE: Normal.    CORONARY ARTERY CALCIFICATION: Moderate.    UPPER ABDOMEN: No worrisome findings.    MUSCULOSKELETAL: Unremarkable.      Impression    IMPRESSION:  1.  No evidence of pulmonary embolism.  2.  A few scattered groundglass opacities in the lungs compatible with  COVID 19.    RAJAT ARCINIEGA MD         SYSTEM ID:  [id number]       Medications   iopamidol (ISOVUE-370) solution 74 mL (74 mLs Intravenous Given 11/30/21 1634)   sodium chloride 0.9 % bag 500mL for CT scan flush use (100 mLs Intravenous Given 11/30/21 1635)       Assessments & Plan (with Medical Decision Making)     78-year-old female presented with chest pain and cough and Covid symptoms after an exposure and had a positive Covid test.  She was Covid vaccinated but has not had a booster.  Her last dose was in March.  She has a breakthrough infection.  She was concerned about PE because of a prior history of PE.  Given this and the current Covid infection she underwent CT scan of the chest which did not show PE.  There was some mild groundglass  opacities present on CT scan consistent with Covid pneumonia but of mild degree.  Oxygenation was normal.  I discussed the findings with her.  I referred her to the get well loop for home virtual monitoring.  Likely she will do well.  She plans to get monoclonal antibody infusion tomorrow.  Nevertheless if she has progressive symptoms, O2 sats below 90%, increased pain, or other worrisome symptoms, she is advised to return for further evaluation.  She expressed understanding and her questions were answered.    I have reviewed the nursing notes.    I have reviewed the findings, diagnosis, plan and need for follow up with the patient.       New Prescriptions    No medications on file       Final diagnoses:   Pneumonia due to 2019 novel coronavirus       11/30/2021   [facility] EMERGENCY DEPT     Viral Andres MD  11/30/21 [id number]

## 2021-11-30 NOTE — TELEPHONE ENCOUNTER
Forwarding for 2nd level triage. Please see notes below.  Do you agree with ER visit?    Disposition:  To ER for evaluation of intermittent chest pain given currently COVID-19 positive and history of PE. Will forward to providers for 2nd level triage per protocol, and patient also wanting to forego ED and have order for chest x-ray.  She was instructed providers usually won't place this type of order without a visit, but will call her back with an answer shortly.     Reason for Disposition    History of prior 'blood clot' in leg or lungs (i.e., deep vein thrombosis, pulmonary embolism)    Additional Information    Negative: Severe difficulty breathing (e.g., struggling for each breath, speaks in single words)    Negative: Passed out (i.e., fainted, collapsed and was not responding)    Negative: Difficult to awaken or acting confused (e.g., disoriented, slurred speech)    Negative: Shock suspected (e.g., cold/pale/clammy skin, too weak to stand, low BP, rapid pulse)    Negative: Chest pain lasting longer than 5 minutes and ANY of the following:* Over 45 years old* Over 30 years old and at least one cardiac risk factor (e.g., diabetes, high blood pressure, high cholesterol, smoker, or strong family history of heart disease)* History of heart disease (i.e., angina, heart attack, heart failure, bypass surgery, takes nitroglycerin)* Pain is crushing, pressure-like, or heavy    Negative: Heart beating < 50 beats per minute OR > 140 beats per minute    Negative: Visible sweat on face or sweat dripping down face    Negative: Sounds like a life-threatening emergency to the triager    Negative: Followed an injury to chest    Negative: SEVERE chest pain    Negative: Pain also in shoulder(s) or arm(s) or jaw    Negative: Difficulty breathing    Negative: Cocaine use within last 3 days    Negative: Major surgery in the past month    Negative: Hip or leg fracture (broken bone) in past month (or had cast on leg or ankle in past  "month)    Negative: Illness requiring prolonged bedrest in past month (e.g., immobilization, long hospital stay)    Negative: Long-distance travel in past month (e.g., car, bus, train, plane; with trip lasting 6 or more hours)    Answer Assessment - Initial Assessment Questions  1. LOCATION: \"Where does it hurt?\"        R side of chest  2. RADIATION: \"Does the pain go anywhere else?\" (e.g., into neck, jaw, arms, back)      No  3. ONSET: \"When did the chest pain begin?\" (Minutes, hours or days)       She had it the other day for a few minutes and went away, now has had it this morning intermittently.   4. PATTERN \"Does the pain come and go, or has it been constant since it started?\"  \"Does it get worse with exertion?\"       Comes and goes.   5. DURATION: \"How long does it last\" (e.g., seconds, minutes, hours)      Lasts a couple minutes.   6. SEVERITY: \"How bad is the pain?\"  (e.g., Scale 1-10; mild, moderate, or severe)     - MILD (1-3): doesn't interfere with normal activities      - MODERATE (4-7): interferes with normal activities or awakens from sleep     - SEVERE (8-10): excruciating pain, unable to do any normal activities        3/10.   7. CARDIAC RISK FACTORS: \"Do you have any history of heart problems or risk factors for heart disease?\" (e.g., angina, prior heart attack; diabetes, high blood pressure, high cholesterol, smoker, or strong family history of heart disease)      History of patent foramen ovale, ASD, history of PE.   8. PULMONARY RISK FACTORS: \"Do you have any history of lung disease?\"  (e.g., blood clots in lung, asthma, emphysema, birth control pills)      Yes, history of PE.   9. CAUSE: \"What do you think is causing the chest pain?\"      Unsure if r/t positive COVID-19 status, vs PE, vs cardiac.   10. OTHER SYMPTOMS: \"Do you have any other symptoms?\" (e.g., dizziness, nausea, vomiting, sweating, fever, difficulty breathing, cough)        A bit of SOB with moderate activity.    Protocols used: " CHEST PAIN-A-OH    Uma Chapman RN  [facility]

## 2021-12-02 ENCOUNTER — PATIENT OUTREACH (OUTPATIENT)
Dept: CARE COORDINATION | Facility: CLINIC | Age: 81
End: 2021-12-02
Payer: COMMERCIAL

## 2021-12-02 NOTE — PROGRESS NOTES
Pt on home virtual monitoring program for COVID-19, call made to do assessment, verify follow-up appt and offer care coordination, no answer.     CC RN left a generic message statin. Asked patient to make a virtual visit in the next one to two days by calling [phone] and that that line also doubles as a nurse triage line should the patient have questions or concerns.   2. Encouraged the patient to participate in the GetWell Loop referral sent to either their smartphone or personal email.     Sirena Lee RN, BSN, PHN Care Coordinator  [facility]   Phone: [phone]

## 2021-12-03 ENCOUNTER — OFFICE VISIT (OUTPATIENT)
Dept: FAMILY MEDICINE | Facility: CLINIC | Age: 81
End: 2021-12-03
Payer: COMMERCIAL

## 2021-12-03 VITALS
DIASTOLIC BLOOD PRESSURE: 86 MMHG | OXYGEN SATURATION: 96 % | SYSTOLIC BLOOD PRESSURE: 146 MMHG | RESPIRATION RATE: 20 BRPM | TEMPERATURE: 98.6 F | HEART RATE: 67 BPM

## 2021-12-03 DIAGNOSIS — J12.82 PNEUMONIA DUE TO 2019 NOVEL CORONAVIRUS: ICD-10-CM

## 2021-12-03 DIAGNOSIS — U07.1 PNEUMONIA DUE TO 2019 NOVEL CORONAVIRUS: ICD-10-CM

## 2021-12-03 DIAGNOSIS — I10 ESSENTIAL HYPERTENSION: Primary | ICD-10-CM

## 2021-12-03 PROCEDURE — 99213 OFFICE O/P EST LOW 20 MIN: CPT | Performed by: NURSE PRACTITIONER

## 2021-12-03 RX ORDER — AMLODIPINE BESYLATE 2.5 MG/1
2.5 TABLET ORAL DAILY
Qty: 30 TABLET | Refills: 1 | Status: SHIPPED | OUTPATIENT
Start: 2021-12-03 | End: 2022-02-10

## 2021-12-03 ASSESSMENT — PAIN SCALES - GENERAL: PAINLEVEL: NO PAIN (0)

## 2021-12-03 NOTE — PATIENT INSTRUCTIONS
Patient Education     High Blood Pressure, New, Begin Treatment    Your blood pressure was high enough today to start treatment with medicines. Often healthcare providers don t know what causes high blood pressure (hypertension). But it can be controlled with lifestyle changes and medicines. High blood pressure usually has no symptoms. But it can sometimes cause headache, dizziness, blurred vision, a rushing sound in your ears, chest pain, or shortness of breath. But even without symptoms, high blood pressure that s not treated raises your risk for heart attack, heart failure, kidney disease, vascular disease, and stroke. High blood pressure is a serious health risk and shouldn t be ignored.    Blood pressure measurements are given as 2 numbers.    Systolic blood pressure is the upper number. This is the pressure when the heart contracts.    Diastolic blood pressure is the lower number. This is the pressure when the heart relaxes between beats.  You will see your blood pressure readings written together. For example, a person with a systolic pressure of 118 and a diastolic pressure of 78 will have 118/78 written in the medical record.   Blood pressure is categorized as normal, elevated, or stage 1 or stage 2 high blood pressure:    Normal blood pressure is systolic of less than 120 and diastolic of less than 80 (120/80)    Elevated blood pressure is systolic of 120 to 129 and diastolic less than 80    Stage 1 high blood pressure is systolic is 130 to 139 or diastolic between 80 to 89    Stage 2 high blood pressure is when systolic is 140 or higher or the diastolic is 90 or higher  Home care  If you have high blood pressure, do what's listed below to lower your blood pressure. If you are taking medicines for high blood pressure, these methods may reduce or end your need for medicines in the future.    Begin a weight-loss program if you are overweight.    Cut back on how much salt you get in your diet. Here s how to  do this:  ? Don t eat foods that have a lot of salt. These include olives, pickles, smoked meats, and salted potato chips.  ? Don t add salt to your food at the table.  ? Use only small amounts of salt when cooking.  ? Review food labels to track how much salt is in prepared foods.  ? When eating out, ask that no additional salt be added to your food order.  ? Ask your provider about the DASH diet or the DASH (dietary approaches to stop hypertension) eating plan.    Start an exercise program. Talk with your healthcare provider about the type of exercise program that would be best for you. It doesn't have to be hard. Even brisk walking for 20 minutes 3 times a week is a good form of exercise.    Don t take medicines that have heart stimulants. This includes many over-the-counter cold and sinus decongestant pills and sprays, as well as diet pills. Check the warnings about high blood pressure on the label. Before purchasing any over-the-counter medicines or supplements, always ask the pharmacist about the product's potential interaction with your high blood pressure and your high blood pressure medicines.    Stimulants such as amphetamine or cocaine could be lethal for someone with high blood pressure. Never take these.    Limit how much caffeine you get in your diet. Switch to caffeine-free products.    Stop smoking. If you are a long-time smoker, this can be hard. Enroll in a stop-smoking program to make it more likely that you will quit for good. Or, talk with your healthcare provider about nicotine replacements or medicines that can help.    Learn how to handle stress. This is an important part of any program to lower blood pressure. Learn about relaxation methods like meditation, yoga, or biofeedback.    If your provider prescribed medicines, take them exactly as directed. Missing doses may cause your blood pressure to get out of control.    If you miss a dose or doses, check with your healthcare provider or  pharmacist about what to do.    Limit alcohol. Drinking too much alcohol can raise blood pressure. Men should have no more than 2 drinks a day. Women should have no more than 1. A drink is equal to 1 beer, or a small glass of wine, or a shot of liquor..    Consider buying an automatic blood pressure machine so you can check your blood pressure regularly at home. Your provider can make a recommendation. You can get one of these at most pharmacies.  The American Heart Association recommends the following guidelines for home blood pressure monitoring:    Don't smoke or drink coffee or other caffeinated drinks or exercise for 30 minutes before taking your blood pressure.    Go to the bathroom before the test.    Relax for 5 minutes before taking the measurement.    Sit with your back supported (don't sit on a couch or soft chair); keep your feet on the floor uncrossed. Place your arm on a solid flat surface (like a table) with the upper part of the arm at heart level. Place the middle of the cuff directly above the bend of the elbow. Check the monitor's instruction manual for an illustration.    Take multiple readings. When you measure, take 2 to 3 readings one minute apart and record all of the results.    Take your blood pressure at the same time every day, or as your healthcare provider recommends.    Record the date, time, and blood pressure reading.    Take the record with you to your next medical appointment. If your blood pressure monitor has a built-in memory, simply take the monitor with you to your next appointment.    Call your provider if you have several high readings. Don't be frightened by a single high blood pressure reading, but if you get several high readings, check in with your healthcare provider.    Note: If blood pressure reaches a systolic (top number) of 180 or higher OR diastolic (bottom number) of 120 or higher, seek emergency medical treatment.  Follow-up care  Because a new blood pressure  medicine was started today, it s important that you have your blood pressure rechecked. This is to make sure that the medicine is working and that you have no serious side effects. Keep all your follow up appointments. Write down medicine and blood pressure questions and bring them to your next appointment. If you have pressing concerns about your new medicine or your blood pressure, call your provider. Unless told otherwise, follow up with your healthcare provider within the next 3 days.  When to seek medical care  Call your healthcare provider right away if any of these occur:    Blood pressure reaches a systolic (top number) of 180 or higher, OR diastolic (bottom number) of 120 or higher, seek emergency medical treatment.    Chest pain or shortness of breath    Severe headache    Throbbing or rushing sound in the ears    Nosebleed    Sudden severe pain in your belly (abdomen)    Extreme drowsiness, confusion, or fainting    Dizziness or dizziness with a spinning sensation (vertigo)    Weakness of an arm or leg or one side of the face    You have problems speaking or seeing   [employer] last reviewed this educational content on 12/1/2019 2000-2021 [employer]. All rights reserved. This information is not intended as a substitute for professional medical care. Always follow your healthcare professional's instructions.

## 2021-12-03 NOTE — PROGRESS NOTES
"  Assessment & Plan     Essential hypertension  Uncontrolled, trial :  - amLODIPine (NORVASC) 2.5 MG tablet; Take 1 tablet (2.5 mg) by mouth daily    Pneumonia due to 2019 novel coronavirus  Improving               BMI:   Estimated body mass index is 28.84 kg/m  as calculated from the following:    Height as of 11/30/21: 1.626 m (5' 4\").    Weight as of 11/30/21: 76.2 kg (168 lb).       FUTURE APPOINTMENTS:       - Make appointment with nurse to check blood pressure in 4 weeks  See Patient Instructions    No follow-ups on file.    NOBLE Sepulveda CNP  [facility]    Pablo Weller is a 81 year old who presents for the following health issues.    [facility]       Hospital ER Follow-up Visit:    Hospital/Nursing Home/IP Rehab Facility: [facility]  Date of ER visit: 11/30/21  Reason(s) for Admission: Covid & chest pain      Was your ER visit to COVID-19? YES   How are you feeling today? Better  In the past 24 hours have you had shortness of breath when speaking, walking, or climbing stairs? My breathing issues have improved  Do you have a cough? Yes, I have a cough but it's not worse  When is the last time you had a fever greater than 100? Never had one   Are you having any other symptoms? Loss of appetite, Fatigue, Pain or pressure in chest and Body aches   Do you have any other stressors you would like to discuss with your provider? No    Was the patient in the ICU or did the patient experience delirium during hospitalization- not admitted?  No          Problems taking medications regularly:  None  Medication changes since discharge: None  Problems adhering to non-medication therapy:  None    Summary of hospitalization:  [facility] discharge summary reviewed  ER note reviewed  Diagnostic Tests/Treatments reviewed.  Follow up needed: none  Other Healthcare Providers Involved in Patient s Care:         None  Update since discharge: improved " after receiving monoclonal antibody infusion       Post Discharge Medication Reconciliation: n/a.  Plan of care communicated with patient     Hypertension Follow-up      Do you check your blood pressure regularly outside of the clinic? No     Are you following a low salt diet? No    Are your blood pressures ever more than 140 on the top number (systolic) OR more   than 90 on the bottom number (diastolic), for example 140/90? Yes           Review of Systems   Constitutional, HEENT, cardiovascular, pulmonary, gi and gu systems are negative, except as otherwise noted.      Objective    BP (!) 146/86   Pulse 67   Temp 98.6  F (37  C) (Tympanic)   Resp 20   SpO2 96%   There is no height or weight on file to calculate BMI.  Physical Exam   GENERAL: healthy, alert and no distress  EYES: Eyes grossly normal to inspection  HENT: ear canals and TM's normal, nose and mouth without ulcers or lesions  NECK: no adenopathy, no asymmetry, masses, or scars and thyroid normal to palpation  RESP: lungs clear to auscultation - no rales, rhonchi or wheezes  CV: regular rates and rhythm, normal S1 S2, no S3 or S4, no murmur, click or rub and no peripheral edema  MS: no gross musculoskeletal defects noted, no edema  SKIN: no suspicious lesions or rashes  NEURO: Normal strength and tone, mentation intact and speech normal

## 2021-12-25 ENCOUNTER — HEALTH MAINTENANCE LETTER (OUTPATIENT)
Age: 81
End: 2021-12-25

## 2022-02-09 ENCOUNTER — TELEPHONE (OUTPATIENT)
Dept: FAMILY MEDICINE | Facility: CLINIC | Age: 82
End: 2022-02-09
Payer: COMMERCIAL

## 2022-02-09 DIAGNOSIS — I10 ESSENTIAL HYPERTENSION: ICD-10-CM

## 2022-02-09 DIAGNOSIS — K21.9 GASTROESOPHAGEAL REFLUX DISEASE, UNSPECIFIED WHETHER ESOPHAGITIS PRESENT: Primary | ICD-10-CM

## 2022-02-09 NOTE — TELEPHONE ENCOUNTER
This writer attempted to contact Janee on 02/09/22      Reason for call Providers message below and left detailed message.      If patient calls back:   Relay message from provider below, (read verbatim), document that pt called and close encounter        Araceli Moss RN BSN

## 2022-02-09 NOTE — TELEPHONE ENCOUNTER
Please call.  Previous clinic blood pressures were elevated.  Please check with patient to see if they have been testing blood pressures at home.  If so please note these and route back to me.  If patient does not have a home blood pressure cuff then please schedule them for a nurse visit for a blood pressure recheck.

## 2022-02-10 ENCOUNTER — ALLIED HEALTH/NURSE VISIT (OUTPATIENT)
Dept: FAMILY MEDICINE | Facility: CLINIC | Age: 82
End: 2022-02-10
Payer: COMMERCIAL

## 2022-02-10 VITALS — DIASTOLIC BLOOD PRESSURE: 64 MMHG | SYSTOLIC BLOOD PRESSURE: 120 MMHG

## 2022-02-10 DIAGNOSIS — I10 ESSENTIAL HYPERTENSION: Primary | ICD-10-CM

## 2022-02-10 PROCEDURE — 99207 PR NO CHARGE LOS: CPT | Performed by: FAMILY MEDICINE

## 2022-02-10 RX ORDER — AMLODIPINE BESYLATE 2.5 MG/1
2.5 TABLET ORAL DAILY
Qty: 90 TABLET | Refills: 4 | Status: SHIPPED | OUTPATIENT
Start: 2022-02-10 | End: 2022-03-30

## 2022-02-10 NOTE — TELEPHONE ENCOUNTER
This writer attempted to contact Janee on 02/10/22      Reason for call Providers message below and left detailed message.  Did not leave a [employer] message as it hasn't been active since 2019.        If patient calls back:              Relay message from provider below, (read verbatim), document that pt called and close encounter           Araceli Moss RN BSN

## 2022-02-10 NOTE — PATIENT INSTRUCTIONS
Your BMI is Body mass index is There is no height or weight on file to calculate BMI.     A BMI of 18.5 to 24.9 is in the healthy range. A person with a BMI of 25 to 29.9 is considered overweight, and someone with a BMI of 30 or greater is considered obese. More than two-thirds of American adults are considered overweight or obese.  Weight management is a personal decision.  If you are interested in exploring weight loss strategies, the following discussion covers the approaches that may be successful. Body mass index (BMI) is one way to tell whether you are at a healthy weight, overweight, or obese. It measures your weight in relation to your height.  Being overweight or obese increases the risk for further weight gain. Excess weight may lead to heart disease and diabetes.  Creating and following plans for healthy eating and physical activity may help you improve your health.  Weight control is part of healthy lifestyle and includes exercise, emotional health, and healthy eating habits. Careful eating habits lifelong are the mainstay of weight control. Though there are significant health benefits from weight loss, long-term weight loss with diet alone may be very difficult to achieve- studies show long-term success with dietary management alone in less than 10% of people. Attaining a healthy weight may be especially difficult to achieve in those with severe obesity. In some cases, medications, devices and surgical management might be considered.  What can you do?    Keep a food journal to help with mindful eating and finding ways to modify your diet.      Reduce the amount of processed food in your diet. Focus on adding vegetables, and lean proteins.    Reduce dietary carbohydrates. Limiting to  gm of carbohydrates per day has been shows to help boost weight loss.  If you have diabetes or are on diabetic medications do not do this without talking to your physician or healthcare provider.    Diet combined with  exercise helps maintain muscle while optimizing fat loss. Strength training is particularly important for building and maintaining muscle mass. Exercise helps reduce stress, increase energy, and improves fitness. Increasing exercise without diet control, however, may not burn enough calories to loose weight.         Start walking three days a week 10-20 minutes at a time    Work towards walking thirty minutes five days a week      Eventually, increase the speed of your walking for 1-2 minutes at time    In addition, we recommend that you review healthy lifestyles and methods for weight loss available through the National Institutes of Health patient information sites:  http://win.niddk.nih.gov/publications/index.htm    Also look into health and wellness programs that may be available through your health insurance provider, employer, local community center, or alfred club.

## 2022-02-10 NOTE — TELEPHONE ENCOUNTER
Blood pressures well controlled. Norvasc refilled. Yes I think she likely would benefit from EGD. Order placed.

## 2022-02-10 NOTE — TELEPHONE ENCOUNTER
Routing to Provider to review and evaluated med pended for refill.    BP: 120/64    Marlene Millan is a 81 year old year old patient who comes in today for a Blood Pressure check because of new medication.  Vital Signs as repeated by RN n/a  Patient is taking medication as prescribed taking in the morning  Patient is tolerating medications well.  Patient is monitoring Blood Pressure at home.  Average readings if yes are 120/64  Current complaints: none    Patient's acid reflux has gotten a lot worse, wondering about an endoscopic procedure. 3 to 4x a week has been vomiting due to this. Was in [place] the last month. Stays away from spicy food. Appointment made for 3/18 at 8am for her to be seen.     Araceli Moss RN BSN

## 2022-02-14 ENCOUNTER — HOSPITAL ENCOUNTER (OUTPATIENT)
Facility: CLINIC | Age: 82
End: 2022-02-14
Attending: SURGERY | Admitting: SURGERY
Payer: COMMERCIAL

## 2022-02-14 DIAGNOSIS — Z11.59 ENCOUNTER FOR SCREENING FOR OTHER VIRAL DISEASES: Primary | ICD-10-CM

## 2022-02-16 ENCOUNTER — TELEPHONE (OUTPATIENT)
Dept: FAMILY MEDICINE | Facility: CLINIC | Age: 82
End: 2022-02-16
Payer: COMMERCIAL

## 2022-02-16 NOTE — TELEPHONE ENCOUNTER
That would be an unusual side effect.  But I think it would be okay just have her stop it for 5-7 days - if muscle pain does not resolve then it is not the amlodipine and she should go ahead and restart that.  If muscle pain does resolve then contact me so we can find an alternative medication.

## 2022-02-16 NOTE — TELEPHONE ENCOUNTER
Called and spoke to Janee. Read Janee the message as written below from Provider. Janee is agreeable to this plan.     Araceli Moss RN BSN

## 2022-02-16 NOTE — TELEPHONE ENCOUNTER
Reason for Call:  Other prescription    Detailed comments: Pt stating she has had muscle pain and back pain since starting Amlodipine in December, she is going to stop this medication as it is effecting her sleep and causing her pain, pharmacist encouraged her to reach out to PCP, please advise.     Phone Number Patient can be reached at: Home number on file [phone] (home)    Best Time: any    Can we leave a detailed message on this number? YES    Call taken on 2/16/2022 at 11:00 AM by Reina Youngblood

## 2022-02-18 ENCOUNTER — NURSE TRIAGE (OUTPATIENT)
Dept: NURSING | Facility: CLINIC | Age: 82
End: 2022-02-18
Payer: COMMERCIAL

## 2022-02-18 NOTE — TELEPHONE ENCOUNTER
Has been having back pain    No numbness or tingling    No loss of bowel or bladder control    No weakness of legs    In her arm she had weakness of her arm    She also has upper back pain    Per protocol patient should be seen either today or tomorrow.    Janet Yeboah RN  [facility]  9:42 AM  2/18/2022    COVID 19 Nurse Triage Plan/Patient Instructions    Please be aware that novel coronavirus (COVID-19) may be circulating in the community. If you develop symptoms such as fever, cough, or SOB or if you have concerns about the presence of another infection including coronavirus (COVID-19), please contact your health care provider or visit [email or web address].     Disposition/Instructions    In-Person Visit with provider recommended. Reference Visit Selection Guide.    Thank you for taking steps to prevent the spread of this virus.  o Limit your contact with others.  o Wear a simple mask to cover your cough.  o Wash your hands well and often.    Resources    [facility]: About COVID-19: [email or web address]    CDC: What to Do If You're Sick: www.cdc.gov/coronavirus/2019-ncov/about/steps-when-sick.html    CDC: Ending Home Isolation: www.cdc.gov/coronavirus/2019-ncov/hcp/disposition-in-home-patients.html     CDC: Caring for Someone: www.cdc.gov/coronavirus/2019-ncov/if-you-are-sick/care-for-someone.html     [facility]: Interim Guidance for Hospital Discharge to Home: www.[email or web address].[facility].mn.us/diseases/coronavirus/hcp/hospdischarge.pdf    [facility] clinical trials (COVID-19 research studies): [email or web address]-[email or web address]     Below are the COVID-19 hotlines at the [facility] ([facility]). Interpreters are available.   o For health questions: Call 349-912-8845 or 1-537.674.9660 (7 a.m. to 7 p.m.)  o For questions about schools and childcare: Call 170-398-3923 or 1-990.642.4027 (7 a.m. to 7 p.m.)                         Reason for Disposition     Age > 50 and no history of prior similar back pain    Additional Information    Negative: Passed out (i.e., fainted, collapsed and was not responding)    Negative: Shock suspected (e.g., cold/pale/clammy skin, too weak to stand, low BP, rapid pulse)    Negative: Sounds like a life-threatening emergency to the triager    Negative: SEVERE back pain of sudden onset and age > 60    Negative: SEVERE abdominal pain (e.g., excruciating)    Negative: Abdominal pain and age > 60    Negative: Unable to urinate (or only a few drops) and bladder feels very full    Negative: Loss of bladder or bowel control (urine or bowel incontinence; wetting self, leaking stool) of new onset    Negative: Numbness (loss of sensation) in groin or rectal area    Negative: Pain radiates into groin, scrotum    Negative: Blood in urine (red, pink, or tea-colored)    Negative: Vomiting and pain over lower ribs of back (i.e., flank - kidney area)    Negative: Weakness of a leg or foot (e.g., unable to bear weight, dragging foot)    Negative: Patient sounds very sick or weak to the triager    Negative: Fever > 100.4 F (38.0 C) and flank pain    Negative: Pain or burning with passing urine (urination)    Negative: SEVERE back pain (e.g., excruciating, unable to do any normal activities) and not improved after pain medicine and CARE ADVICE    Negative: Numbness in an arm or hand (i.e., loss of sensation) and upper back pain    Negative: Numbness in a leg or foot (i.e., loss of sensation)    Negative: High-risk adult (e.g., history of cancer, history of HIV, or history of IV drug abuse)    Negative: Painful rash with multiple small blisters grouped together (i.e., dermatomal distribution or 'band' or 'stripe')    Negative: Pain radiates into the thigh or further down the leg, and in both legs    Protocols used: BACK PAIN-A-[place]

## 2022-02-18 NOTE — TELEPHONE ENCOUNTER
"Nurse Triage SBAR    Is this a 2nd Level Triage?  No    Situation  Chest and back pain onset one week ago  Background: One week history of chest pain and back pain   Location:  Mid chest right side; \" tissue pain\"  Onset one week ago.  Pain with activity. No pain at rest.   Back pain right back shoulder blade; Onset with activity, twisting/bending, walking.  No pain at rest.   Taking Advil / Tylenol rotation every 4-6 hrs during the day and at bedtime. No relief from pain medication.   Biofreeze for shoulder pain with relief.     Fall - [place] on ice.  Fell on knees and broke fall with arms.  Increased pain right arm post fall.   2/10/22 new B/P med Amlodipine 2.5 mg tablet once daily.    Contacted Pharmacy after start of med with concerns of muscle aches.  Contacted PCP and instructed to hold medication for 7 days.   Start of 7 days Tuesday 02/15/22.  Requesting in person apt. For evaluation     Assessment  Chest and back pain possible onset post fall [place] with continued pain exacerbated by movement.  Questioning pain related to fall - vs- new B/P med start      Protocol Recommended Disposition: Call interrupted per technical difficulty transferred to another triage nurse for completion.      Rosemary Gonzalez RN  [facility]  9:08 AM  2/18/2022      COVID 19 Nurse Triage Plan/Patient Instructions    Please be aware that novel coronavirus (COVID-19) may be circulating in the community. If you develop symptoms such as fever, cough, or SOB or if you have concerns about the presence of another infection including coronavirus (COVID-19), please contact your health care provider or visit [email or web address].     Disposition/Instructions    Home care recommended. Follow home care protocol based instructions.    Thank you for taking steps to prevent the spread of this virus.  o Limit your contact with others.  o Wear a simple mask to cover your cough.  o Wash your hands well and " often.    Resources    [facility]: About COVID-19: [email or web address]    CDC: What to Do If You're Sick: www.cdc.gov/coronavirus/2019-ncov/about/steps-when-sick.html    CDC: Ending Home Isolation: www.cdc.gov/coronavirus/2019-ncov/hcp/disposition-in-home-patients.html     CDC: Caring for Someone: www.cdc.gov/coronavirus/2019-ncov/if-you-are-sick/care-for-someone.html     [facility]: Interim Guidance for Hospital Discharge to Home: www.health.[facility].mn./diseases/coronavirus/hcp/hospdischarge.pdf    [facility] clinical trials (COVID-19 research studies): [email or web address].[email or web address]-clinical-trials     Below are the COVID-19 hotlines at the [place] [facility] ([facility]). Interpreters are available.   o For health questions: Call 777-651-4696 or 1-509.280.7464 (7 a.m. to 7 p.m.)  o For questions about schools and childcare: Call 433-064-6031 or 1-581.406.9891 (7 a.m. to 7 p.m.)

## 2022-02-20 ENCOUNTER — APPOINTMENT (OUTPATIENT)
Dept: CT IMAGING | Facility: CLINIC | Age: 82
End: 2022-02-20
Attending: EMERGENCY MEDICINE
Payer: COMMERCIAL

## 2022-02-20 ENCOUNTER — APPOINTMENT (OUTPATIENT)
Dept: ULTRASOUND IMAGING | Facility: CLINIC | Age: 82
End: 2022-02-20
Attending: INTERNAL MEDICINE
Payer: COMMERCIAL

## 2022-02-20 ENCOUNTER — HOSPITAL ENCOUNTER (OUTPATIENT)
Facility: CLINIC | Age: 82
Setting detail: OBSERVATION
Discharge: HOME | End: 2022-02-22
Attending: EMERGENCY MEDICINE | Admitting: HOSPITALIST
Payer: COMMERCIAL

## 2022-02-20 DIAGNOSIS — Z11.52 ENCOUNTER FOR SCREENING LABORATORY TESTING FOR SEVERE ACUTE RESPIRATORY SYNDROME CORONAVIRUS 2 (SARS-COV-2): ICD-10-CM

## 2022-02-20 DIAGNOSIS — I26.99 ACUTE PULMONARY EMBOLISM WITHOUT ACUTE COR PULMONALE, UNSPECIFIED PULMONARY EMBOLISM TYPE (H): ICD-10-CM

## 2022-02-20 LAB
ANION GAP SERPL CALCULATED.3IONS-SCNC: 5 MMOL/L (ref 3–14)
BUN SERPL-MCNC: 23 MG/DL (ref 7–30)
CALCIUM SERPL-MCNC: 9.1 MG/DL (ref 8.5–10.1)
CHLORIDE BLD-SCNC: 105 MMOL/L (ref 94–109)
CO2 SERPL-SCNC: 29 MMOL/L (ref 20–32)
CREAT SERPL-MCNC: 0.86 MG/DL (ref 0.52–1.04)
D DIMER PPP FEU-MCNC: 2.7 UG/ML FEU (ref 0–0.5)
ERYTHROCYTE [DISTWIDTH] IN BLOOD BY AUTOMATED COUNT: 15.3 % (ref 10–15)
GFR SERPL CREATININE-BSD FRML MDRD: 68 ML/MIN/1.73M2
GLUCOSE BLD-MCNC: 114 MG/DL (ref 70–99)
HCT VFR BLD AUTO: 35.8 % (ref 35–47)
HGB BLD-MCNC: 12.7 G/DL (ref 11.7–15.7)
HOLD SPECIMEN: NORMAL
HOLD SPECIMEN: NORMAL
INR PPP: 0.94 (ref 0.85–1.15)
MCH RBC QN AUTO: 33.8 PG (ref 26.5–33)
MCHC RBC AUTO-ENTMCNC: 35.5 G/DL (ref 31.5–36.5)
MCV RBC AUTO: 95 FL (ref 78–100)
PLATELET # BLD AUTO: 293 10E3/UL (ref 150–450)
POTASSIUM BLD-SCNC: 4.1 MMOL/L (ref 3.4–5.3)
RBC # BLD AUTO: 3.76 10E6/UL (ref 3.8–5.2)
SARS-COV-2 RNA RESP QL NAA+PROBE: NEGATIVE
SODIUM SERPL-SCNC: 139 MMOL/L (ref 133–144)
TROPONIN I SERPL HS-MCNC: 103 NG/L
TROPONIN I SERPL HS-MCNC: 94 NG/L
WBC # BLD AUTO: 8.8 10E3/UL (ref 4–11)

## 2022-02-20 PROCEDURE — 85027 COMPLETE CBC AUTOMATED: CPT | Performed by: EMERGENCY MEDICINE

## 2022-02-20 PROCEDURE — G0378 HOSPITAL OBSERVATION PER HR: HCPCS

## 2022-02-20 PROCEDURE — 99285 EMERGENCY DEPT VISIT HI MDM: CPT | Mod: 25 | Performed by: EMERGENCY MEDICINE

## 2022-02-20 PROCEDURE — 93970 EXTREMITY STUDY: CPT

## 2022-02-20 PROCEDURE — 80048 BASIC METABOLIC PNL TOTAL CA: CPT | Performed by: EMERGENCY MEDICINE

## 2022-02-20 PROCEDURE — 96372 THER/PROPH/DIAG INJ SC/IM: CPT | Performed by: EMERGENCY MEDICINE

## 2022-02-20 PROCEDURE — 250N000011 HC RX IP 250 OP 636: Performed by: EMERGENCY MEDICINE

## 2022-02-20 PROCEDURE — 71275 CT ANGIOGRAPHY CHEST: CPT

## 2022-02-20 PROCEDURE — 36415 COLL VENOUS BLD VENIPUNCTURE: CPT | Performed by: EMERGENCY MEDICINE

## 2022-02-20 PROCEDURE — 93308 TTE F-UP OR LMTD: CPT | Mod: 26 | Performed by: EMERGENCY MEDICINE

## 2022-02-20 PROCEDURE — 250N000013 HC RX MED GY IP 250 OP 250 PS 637: Performed by: INTERNAL MEDICINE

## 2022-02-20 PROCEDURE — 93005 ELECTROCARDIOGRAM TRACING: CPT | Performed by: EMERGENCY MEDICINE

## 2022-02-20 PROCEDURE — 99220 PR INITIAL OBSERVATION CARE,LEVEL III: CPT | Performed by: INTERNAL MEDICINE

## 2022-02-20 PROCEDURE — 93308 TTE F-UP OR LMTD: CPT | Performed by: EMERGENCY MEDICINE

## 2022-02-20 PROCEDURE — 87635 SARS-COV-2 COVID-19 AMP PRB: CPT | Performed by: EMERGENCY MEDICINE

## 2022-02-20 PROCEDURE — C9803 HOPD COVID-19 SPEC COLLECT: HCPCS | Performed by: EMERGENCY MEDICINE

## 2022-02-20 PROCEDURE — 250N000009 HC RX 250: Performed by: EMERGENCY MEDICINE

## 2022-02-20 PROCEDURE — 85379 FIBRIN DEGRADATION QUANT: CPT | Performed by: EMERGENCY MEDICINE

## 2022-02-20 PROCEDURE — 84484 ASSAY OF TROPONIN QUANT: CPT | Mod: 91 | Performed by: EMERGENCY MEDICINE

## 2022-02-20 PROCEDURE — 250N000013 HC RX MED GY IP 250 OP 250 PS 637: Performed by: EMERGENCY MEDICINE

## 2022-02-20 PROCEDURE — 85610 PROTHROMBIN TIME: CPT | Performed by: EMERGENCY MEDICINE

## 2022-02-20 PROCEDURE — 93010 ELECTROCARDIOGRAM REPORT: CPT | Mod: 59 | Performed by: EMERGENCY MEDICINE

## 2022-02-20 RX ORDER — NALOXONE HYDROCHLORIDE 0.4 MG/ML
0.4 INJECTION, SOLUTION INTRAMUSCULAR; INTRAVENOUS; SUBCUTANEOUS
Status: DISCONTINUED | OUTPATIENT
Start: 2022-02-20 | End: 2022-02-22 | Stop reason: HOSPADM

## 2022-02-20 RX ORDER — ACETAMINOPHEN 325 MG/1
975 TABLET ORAL ONCE
Status: COMPLETED | OUTPATIENT
Start: 2022-02-20 | End: 2022-02-20

## 2022-02-20 RX ORDER — OXYCODONE HYDROCHLORIDE 5 MG/1
5-10 TABLET ORAL
Status: DISCONTINUED | OUTPATIENT
Start: 2022-02-20 | End: 2022-02-22 | Stop reason: HOSPADM

## 2022-02-20 RX ORDER — IOPAMIDOL 755 MG/ML
75 INJECTION, SOLUTION INTRAVASCULAR ONCE
Status: COMPLETED | OUTPATIENT
Start: 2022-02-20 | End: 2022-02-20

## 2022-02-20 RX ORDER — ACETAMINOPHEN 500 MG
1000 TABLET ORAL 3 TIMES DAILY
Status: DISCONTINUED | OUTPATIENT
Start: 2022-02-20 | End: 2022-02-22 | Stop reason: HOSPADM

## 2022-02-20 RX ORDER — ATORVASTATIN CALCIUM 20 MG/1
40 TABLET, FILM COATED ORAL DAILY
Status: DISCONTINUED | OUTPATIENT
Start: 2022-02-20 | End: 2022-02-22 | Stop reason: HOSPADM

## 2022-02-20 RX ORDER — NALOXONE HYDROCHLORIDE 0.4 MG/ML
0.2 INJECTION, SOLUTION INTRAMUSCULAR; INTRAVENOUS; SUBCUTANEOUS
Status: DISCONTINUED | OUTPATIENT
Start: 2022-02-20 | End: 2022-02-22 | Stop reason: HOSPADM

## 2022-02-20 RX ORDER — HYDROMORPHONE HYDROCHLORIDE 1 MG/ML
0.5 INJECTION, SOLUTION INTRAMUSCULAR; INTRAVENOUS; SUBCUTANEOUS ONCE
Status: DISCONTINUED | OUTPATIENT
Start: 2022-02-20 | End: 2022-02-21 | Stop reason: DRUGHIGH

## 2022-02-20 RX ORDER — ONDANSETRON 2 MG/ML
4 INJECTION INTRAMUSCULAR; INTRAVENOUS EVERY 6 HOURS PRN
Status: DISCONTINUED | OUTPATIENT
Start: 2022-02-20 | End: 2022-02-22 | Stop reason: HOSPADM

## 2022-02-20 RX ORDER — NALOXONE HYDROCHLORIDE 0.4 MG/ML
0.2 INJECTION, SOLUTION INTRAMUSCULAR; INTRAVENOUS; SUBCUTANEOUS
Status: DISCONTINUED | OUTPATIENT
Start: 2022-02-20 | End: 2022-02-21

## 2022-02-20 RX ORDER — HYDROMORPHONE HYDROCHLORIDE 1 MG/ML
.3-.5 INJECTION, SOLUTION INTRAMUSCULAR; INTRAVENOUS; SUBCUTANEOUS
Status: DISCONTINUED | OUTPATIENT
Start: 2022-02-20 | End: 2022-02-22 | Stop reason: HOSPADM

## 2022-02-20 RX ORDER — NAPROXEN SODIUM 220 MG
440 TABLET ORAL DAILY PRN
Status: ON HOLD | COMMUNITY
End: 2022-02-22

## 2022-02-20 RX ORDER — NALOXONE HYDROCHLORIDE 0.4 MG/ML
0.4 INJECTION, SOLUTION INTRAMUSCULAR; INTRAVENOUS; SUBCUTANEOUS
Status: DISCONTINUED | OUTPATIENT
Start: 2022-02-20 | End: 2022-02-21

## 2022-02-20 RX ORDER — LIDOCAINE 40 MG/G
CREAM TOPICAL
Status: DISCONTINUED | OUTPATIENT
Start: 2022-02-20 | End: 2022-02-22 | Stop reason: HOSPADM

## 2022-02-20 RX ORDER — ONDANSETRON 4 MG/1
4 TABLET, ORALLY DISINTEGRATING ORAL EVERY 6 HOURS PRN
Status: DISCONTINUED | OUTPATIENT
Start: 2022-02-20 | End: 2022-02-22 | Stop reason: HOSPADM

## 2022-02-20 RX ADMIN — ENOXAPARIN SODIUM 60 MG: 100 INJECTION SUBCUTANEOUS at 14:12

## 2022-02-20 RX ADMIN — IOPAMIDOL 75 ML: 755 INJECTION, SOLUTION INTRAVENOUS at 12:35

## 2022-02-20 RX ADMIN — APIXABAN 10 MG: 5 TABLET, FILM COATED ORAL at 19:54

## 2022-02-20 RX ADMIN — ACETAMINOPHEN 975 MG: 325 TABLET, FILM COATED ORAL at 15:11

## 2022-02-20 RX ADMIN — ATORVASTATIN CALCIUM 40 MG: 20 TABLET, FILM COATED ORAL at 17:42

## 2022-02-20 RX ADMIN — SODIUM CHLORIDE 100 ML: 9 INJECTION, SOLUTION INTRAVENOUS at 12:35

## 2022-02-20 RX ADMIN — ACETAMINOPHEN 1000 MG: 500 TABLET, FILM COATED ORAL at 22:00

## 2022-02-20 ASSESSMENT — ACTIVITIES OF DAILY LIVING (ADL)
CONCENTRATING,_REMEMBERING_OR_MAKING_DECISIONS_DIFFICULTY: NO
HEARING_DIFFICULTY_OR_DEAF: NO
TOILETING_ISSUES: NO
DOING_ERRANDS_INDEPENDENTLY_DIFFICULTY: NO
VISION_MANAGEMENT: GLASSES
WALKING_OR_CLIMBING_STAIRS_DIFFICULTY: NO
DIFFICULTY_COMMUNICATING: NO
DIFFICULTY_EATING/SWALLOWING: NO
WEAR_GLASSES_OR_BLIND: YES
DRESSING/BATHING_DIFFICULTY: NO

## 2022-02-20 NOTE — ED NOTES
Pt states headache is gone but would like tylenol for her back pain instead of dilaudid.  MD aware.

## 2022-02-20 NOTE — H&P
[facility]    History and Physical  Hospital Medicine       Date of Admission:  2/20/2022  Date of Service: 2/20/2022     Assessment & Plan   Marlene Millan is a 81 year old female with past medical history significant for PE after knee surgery in 2012 who now presents on 2/20/2022 with right-sided chest wall pain with some radiation to the back for the last 2 weeks since returning from [place]    Bilateral pulmonary embolism, subacute    Two weeks of right sided chest pain with movement, no pleuritic pain, no shortness of breath, no syncope. No exertional chest pain or shortness of breath. No lower extremity swelling but has had cramping in calves. H/o provoked PE in 2012 following TKA treated 3 months with warfarin. H/o COVID-19 late Nov 2021 with CT chest neg for PE. Plane ride from [place] to [place] 2/5/2022 and chest pain started shortly after this flight.     In the ED, CT chest angio positive for bilateral filling defects in segmental and subsegmental pulmonary arteries, consistent with pulmonary emboli. No evidence of right heart strain. Clot burden appears more prominent on left than right by my review of images. No evidence of pulmonary infarcts. No hypoxemia or hemodynamic changes. Enoxaparin 1 mg/kg subcutaneous given.  Small troponin elevation 103 and recheck is 94 with upper limits of normal being 54.  This is not as high as a troponin leak is she had for her PEs associate with right heart strain in 2012.   -Discussed anticoagulation options with patient, including warfarin with enoxaparin bridging which the patient experienced previously, Eliquis and Xarelto.  After discussion of risks and benefits, patient wishes to start Eliquis.  The twice a day dosing might be a challenge but she plans to use her phone as a timer.  Patient's daughter who is a RN participated in the discussion as well.   -Check bilateral lower extremity ultrasounds for clot burden   -This point this  "appears to be provoked by the flight from [place] and there may possibly be some residual hypercoagulability from COVID-19 though it has been a couple months since her illness.  Recommend minimum 3 months to 6 months treatment at this time.   -Avoid NSAIDs including baby aspirin    Right-sided chest pain and back pain    Pain is easily reproducible with even mild palpation of the chest wall the right and the back pain is also reproducible with moderate palpation across the upper mid back.  This seems to be musculoskeletal in nature.  I suspect this may be reactive to the pulmonary emboli with muscle spasms.  No known injury.   -Trial of scheduled acetaminophen, patient not interested in additional pain medication   -Expect will improve over next 1-2 weeks    Hypertension     Started amlodipine in January and not held the last week as felt chest pain was due to this. BP elevated markedly this admission.   - resume amlodipine    Hyperlipidemia     On atorvastatin   - continue atorvastatin    COVID-19 status  Vaccinated last July. H/o moderate case of COVID-19 pneumonia late November 2021 treated with monoclonal Ab. No booster due to needs to wait 90 days after monoclonal Ab therapy. SARS-CoV-2 PCR negative this admission.    Clinically Significant Risk Factors Present on Admission               # Platelet Defect: home medication list includes an antiplatelet medication   # Obesity: Estimated body mass index is 31.19 kg/m  as calculated from the following:    Height as of this encounter: 1.6 m (5' 3\").    Weight as of this encounter: 79.9 kg (176 lb 1.6 oz).        Diet: Low Saturated Fat Na <2400 mg    DVT Prophylaxis: DOAC  Snowden Catheter: Not present  Central Lines: None  Code Status:   Full         Disposition: Anticipate discharge in 1 day(s) once seen stable on anticoagulation. Appropriate for observation care    Alden Gerard MD  Hospital Medicine        Primary Care Physician   Katy Rodriguez " "[phone]    History is obtained from the patient who is a good historian and review of old records via the EMR.    History of Present Illness   Marlene Millan is a 81 year old female who presents with 2 weeks of right-sided chest pain radiating to mid-upper back. Symptoms began shortly after a flight back from [place] to [place] Feb 5.  Right chest pain has been sharp, 10 of 10, worse with getting up or sitting down but not with deep breaths or with walking. No shortness of breath. Today symptoms felt little worse and also felt some \"numbness\" in the right arm which made her think of her heart.  She is also been feeling more fatigued since returning home from [place].  No shortness of breath.  No lightheadedness.  No central chest pain.  No lower extremity edema/swelling but has had cramping in the calves at night last couple of weeks. Patient thought the chest pain and back pain might be due to amlodipine she started in January and has held the past week. No fever or chills. Patient had COVID-19 late November associated with shortness of breath and had CT chest 11/30/2021 that was negative for PE and positive for mild scattered GGOs. Patient is vaccinated.     Review of Systems   The 10 point Review of Systems is negative other than noted in the HPI or here. Has had right arm pain in January though this is long-standing since a fall years ago. In January while in [place], was able to take long walks up to 6 miles without significant dyspnea on exertion but did then have more fatigue and arm discomfort in the evenings after the walks.  She had trouble swallowing foods and has an EGD scheduled for early March.    Past Medical History    Past Medical History:   Diagnosis Date     Basal cell carcinoma      Hypertension 6/7/2010     Macular degeneration      PONV (postoperative nausea and vomiting)      Pulmonary embolism, bilateral (H) 2/19/2012    [facility]-surgical at [facility] following total knee " replacement      Shingles outbreak December 2016       Patent foramen ovale 02/27/2012     Priority: Medium     Incidental finding on transesophageal echocardiogram       GERD (gastroesophageal reflux disease) 02/25/2011     Priority: Medium     Controlled by diet       HYPERLIPIDEMIA LDL GOAL <130 10/31/2010     Priority: Medium     Spinal Stenosis of Lumbar Region 07/14/2008     Priority: Medium       Past Surgical History   Past Surgical History:   Procedure Laterality Date     basal cell cancer of nose  Oct 2012     BREAST SURGERY      breast reduction     CARPAL TUNNEL RELEASE RT/LT      right     COMBINED REPAIR PTOSIS WITH BLEPHAROPLASTY BILATERAL Bilateral 6/30/2015    Procedure: COMBINED REPAIR PTOSIS WITH BLEPHAROPLASTY BILATERAL;  Surgeon: Ilir Marvin MD;  Location:  [facility]     HC REMOVAL GALLBLADDER       LIGATE VEIN VARICOSE, PHLEBECTOMY MULTIPLE STAB, COMBINED Bilateral 8/13/2015    Procedure: COMBINED LIGATE VEIN VARICOSE, PHLEBECTOMY MULTIPLE STAB;  Surgeon: Alphonse Pro MD;  Location: [facility] OR     ORTHOPEDIC SURGERY      bilateral knee     PHACOEMULSIFICATION WITH STANDARD INTRAOCULAR LENS IMPLANT Right 12/21/2017    Procedure: PHACOEMULSIFICATION WITH STANDARD INTRAOCULAR LENS IMPLANT;  Right Cataract Removal with Implant;  Surgeon: Clif Michel MD;  Location: [facility] OR     SURGICAL HISTORY OF -       breast reduction mammoplasty 1990s     SURGICAL HISTORY OF -       bilateral lower extremity vein stripping     SURGICAL HISTORY OF -   2/22/2010    left total knee arthroplasty        Prior to Admission Medications   Prior to Admission Medications   Prescriptions Last Dose Informant Patient Reported? Taking?   ASPIRIN 81 PO 2/20/2022 at Unknown time Self Yes Yes   Sig: Take 1 tablet by mouth daily   Cholecalciferol (VITAMIN D3) 25 MCG (1000 UT) CAPS Past Week at Unknown time Self Yes Yes   Sig: Take 1 capsule by mouth daily   FIBER ADULT GUMMIES PO Unknown at Unknown time Self Yes Yes    Sig: Take 2 chew tab by mouth daily   MULTIPLE VITAMINS PO Past Week at Unknown time  Yes Yes   Sig: Take by mouth daily Chewable - 2 of them each day   Multiple Vitamins-Minerals (HAIR SKIN AND NAILS FORMULA PO) Past Week at Unknown time Self Yes Yes   Sig: Take 2 chew tab by mouth   Multiple Vitamins-Minerals (MACULAR VITAMIN BENEFIT PO) Past Week at Unknown time  Yes Yes   Sig: Take 1 tablet by mouth daily   amLODIPine (NORVASC) 2.5 MG tablet 2022 at 0800  No Yes   Sig: Take 1 tablet (2.5 mg) by mouth daily   atorvastatin (LIPITOR) 40 MG tablet Past Week at Unknown time  No Yes   Sig: TAKE 1 TABLET (40 MG) BY MOUTH DAILY   ibuprofen (ADVIL/MOTRIN) 800 MG tablet Unknown at Unknown time  No Yes   Sig: Take 1 tablet (800 mg) by mouth every 8 hours as needed for moderate pain   omeprazole (PRILOSEC) 20 MG DR capsule Unknown at Unknown time  No Yes   Sig: Take 1 capsule (20 mg) by mouth daily 1/2 hour prior to main meal      Facility-Administered Medications: None     Allergies   Allergies   Allergen Reactions     Sulfa Drugs      Affected eyesight       Family History    Family History   Problem Relation Age of Onset     Diabetes Father         last both legs to the disease     Heart Disease Father          age 91         Social History   Social History     Socioeconomic History     Marital status:      Spouse name: Not on file     Number of children: Not on file     Years of education: Not on file     Highest education level: Not on file   Occupational History     Real estate   Tobacco Use     Smoking status: Former Smoker     Years: 20.00     Types: Cigarettes     Quit date: 1982     Years since quittin.0     Smokeless tobacco: Never Used   Vaping Use     Vaping Use: Never used   Substance and Sexual Activity     Alcohol use: Yes     Comment: occ wine     Drug use: No     Sexual activity: Not Currently       Physical Exam   BP (!) 195/86 (BP Location: Left arm)   Pulse 65   Temp 98  F  "(36.7  C) (Oral)   Resp 18   Ht 1.6 m (5' 3\")   Wt 79.9 kg (176 lb 1.6 oz)   SpO2 96%   BMI 31.19 kg/m       Weight: 176 lbs 1.6 oz Body mass index is 31.19 kg/m .     Constitutional: Alert, oriented, cooperative, no apparent distress, appears nontoxic  Eyes: Eyes are clear, pupils are 4 mm and reactive.  HEENT: Oropharynx is clear and moist. No evidence of cranial trauma.  Lymph/Hematologic: No epitrochlear, axillary, anterior or posterior cervical, or supraclavicular lymphadenopathy is appreciated.  Cardiovascular: Regular rate and rhythm, normal S1 and S2, and no murmur noted. JVP is normal. Good peripheral pulses in wrists bilaterally. No lower extremity edema.  Respiratory: Clear to auscultation bilaterally.   GI: Soft, non-tender, normal bowel sounds  Genitourinary: Deferred  Musculoskeletal: Very tender right-sided chest wall to mild palpation and reproduces the chest pain. Also muscular tenderness across the mid upper back reproduces the back pain she has.  Skin: Warm and dry, no rashes.   Neurologic: Neck supple. Cranial nerves are grossly intact.  is symmetric.  Dorsiflexion is within normal limits    Data   Data reviewed today:   Recent Labs   Lab 02/20/22  1024   WBC 8.8   HGB 12.7   MCV 95      INR 0.94      POTASSIUM 4.1   CHLORIDE 105   CO2 29   BUN 23   CR 0.86   ANIONGAP 5   YESIKA 9.1   *       Recent Results (from the past 24 hour(s))   CT Chest Pulmonary Embolism w Contrast    Narrative    EXAM: CT CHEST PULMONARY EMBOLISM W CONTRAST  LOCATION: [facility]  DATE/TIME: 2/20/2022 12:34 PM    INDICATION: Chest pain, PE suspected, low/intermediate probability, positive D-dimer.  COMPARISON: Chest CTA on 11/30/2021.  TECHNIQUE: CT chest pulmonary angiogram during arterial phase injection of IV contrast. Multiplanar reformats and MIP reconstructions were performed. Dose reduction techniques were used.   CONTRAST: 75 mL Isovue " 370.    FINDINGS:  ANGIOGRAM CHEST: Multiple filling defects within the segmental and subsegmental pulmonary arteries, consistent with pulmonary emboli. No evidence of right heart strain.    LUNGS AND PLEURA: Bibasilar pulmonary opacities, likely atelectasis.    MEDIASTINUM/AXILLAE: No cardiomegaly or significant pericardial effusion. No significant mediastinal, hilar or axillary lymphadenopathy.    CORONARY ARTERY CALCIFICATION: Moderate.    UPPER ABDOMEN: Limited evaluation of the upper abdomen due to lack of coverage and timing of contrast. Partially visualized at least 4.5 cm renal cyst at the upper pole left kidney. Multiple calcified splenic granulomas.    MUSCULOSKELETAL: Normal.      Impression    IMPRESSION:  1.  The study is positive for multiple bilateral pulmonary emboli. No evidence of right heart strain.  2.  Moderate atherosclerotic vascular calcification of the coronary arteries.   POC US ECHO LIMITED    [facility] Procedure Note     Limited Bedside ED Cardiac Ultrasound:    PROCEDURE: PERFORMED BY: Dr. Ventura Huntley MD  INDICATIONS/SYMPTOM:  Chest Pain and Elevated Troponin  PROBE: Cardiac phased array probe  BODY LOCATION: Chest  FINDINGS:   The ultrasound was performed utilizing the subcostal, parasternal long axis, parasternal short axis and apical 4 chamber views.  Cardiac contractility:  Present  Gross estimation of cardiac kinesis: normal  Pericardial Effusion:  None  RV:LV ratio: LV > RV  IVC:    Diameter: IVC diameter expiration (IVCe) ~ 1 cm                                             IVC diameter inspiration (IVCi) > 50%                                                   Collapsibility:  IVC collapses > 50% with inspiration  INTERPRETATION: No evidence of right-sided heart strain, no septal bowing, chamber size and motion were grossly normal with LV > RV, normal cardiac kinesis.  No pericardial effusion was found.  IVC visualized and findings indicate  normovolemia.  IMAGE DOCUMENTATION: Images were archived to PACs system.           I personally reviewed the chest CT image(s) showing Bilateral PEs that are segmental and subsegmental on the right and also the left with some clot distal more proximal and looks like there is more clot burden on the left to my view.  Lungs are clear..    Alden Gerard MD  [facility] Medicine

## 2022-02-20 NOTE — ED NOTES
Pt states that she has a headache but does not want dilaudid and wants to eat and have a cup of coffee first.  Pt thinks headache is related to not having coffee and eating.  Pt given sandwich and coffee.

## 2022-02-20 NOTE — ED TRIAGE NOTES
right sided chest pain, pt reports no pain at rest worse with movement. Pt reports pain radiates into back and down right arm, pt reports hx of blood clots. Pt did have covid back in november

## 2022-02-20 NOTE — PROGRESS NOTES
[place] [facility] [place]    Patient admitted to room 2406 at approximately 1545 via cart from emergency room. Patient was accompanied by transport tech.     Verbal SBAR report received from ED Nurse prior to patient arrival.     Patient ambulated to bed with stand-by assist. Patient alert and oriented X 3. Pain is controlled with current analgesics.  Medication(s) being used: acetaminophen.  . Admission vital signs: Blood pressure (!) 195/86, pulse 65, temperature 98  F (36.7  C), temperature source Oral, resp. rate 18, height 1.6 m (5' 3\"), weight 79.9 kg (176 lb 1.6 oz), SpO2 96 %, not currently breastfeeding. Patient was oriented to plan of care, call light, bed controls, tv, telephone, bathroom and visiting hours.     Risk Assessment    The following safety risks were identified during admission: fall. Yellow risk band applied: YES.     Skin Initial Assessment    This writer admitted this patient and completed a full skin assessment and Bryce score in the Adult PCS flowsheet. Appropriate interventions initiated as needed.     Secondary skin check completed by Ava LYONS RN.         Education    Patient has a [place] to Observation order: Yes  Observation education completed and documented: Yes      Iqra Silva RN    "

## 2022-02-21 PROBLEM — H25.10 SENILE NUCLEAR CATARACT: Status: ACTIVE | Noted: 2017-12-19

## 2022-02-21 LAB — INR PPP: 1.4 (ref 0.85–1.15)

## 2022-02-21 PROCEDURE — 250N000013 HC RX MED GY IP 250 OP 250 PS 637: Performed by: INTERNAL MEDICINE

## 2022-02-21 PROCEDURE — 99225 PR SUBSEQUENT OBSERVATION CARE,LEVEL II: CPT | Performed by: INTERNAL MEDICINE

## 2022-02-21 PROCEDURE — 36415 COLL VENOUS BLD VENIPUNCTURE: CPT | Performed by: INTERNAL MEDICINE

## 2022-02-21 PROCEDURE — 250N000013 HC RX MED GY IP 250 OP 250 PS 637: Performed by: HOSPITALIST

## 2022-02-21 PROCEDURE — 85610 PROTHROMBIN TIME: CPT | Performed by: INTERNAL MEDICINE

## 2022-02-21 PROCEDURE — 99207 PR CDG-CODE CATEGORY CHANGED: CPT | Performed by: INTERNAL MEDICINE

## 2022-02-21 PROCEDURE — G0378 HOSPITAL OBSERVATION PER HR: HCPCS

## 2022-02-21 RX ORDER — AMLODIPINE BESYLATE 2.5 MG/1
2.5 TABLET ORAL DAILY
Status: DISCONTINUED | OUTPATIENT
Start: 2022-02-21 | End: 2022-02-22 | Stop reason: HOSPADM

## 2022-02-21 RX ORDER — WARFARIN SODIUM 5 MG/1
5 TABLET ORAL
Status: DISCONTINUED | OUTPATIENT
Start: 2022-02-21 | End: 2022-02-21

## 2022-02-21 RX ADMIN — AMLODIPINE BESYLATE 2.5 MG: 2.5 TABLET ORAL at 14:02

## 2022-02-21 RX ADMIN — APIXABAN 10 MG: 5 TABLET, FILM COATED ORAL at 09:16

## 2022-02-21 RX ADMIN — ACETAMINOPHEN 1000 MG: 500 TABLET, FILM COATED ORAL at 21:12

## 2022-02-21 RX ADMIN — ATORVASTATIN CALCIUM 40 MG: 20 TABLET, FILM COATED ORAL at 09:17

## 2022-02-21 RX ADMIN — ACETAMINOPHEN 1000 MG: 500 TABLET, FILM COATED ORAL at 09:17

## 2022-02-21 RX ADMIN — ACETAMINOPHEN 1000 MG: 500 TABLET, FILM COATED ORAL at 14:02

## 2022-02-21 RX ADMIN — APIXABAN 10 MG: 5 TABLET, FILM COATED ORAL at 19:42

## 2022-02-21 NOTE — CARE PLAN
A&Ox4.   Mobility: SBA.   Reg diet.   Voiding adequately.   Last BM 2/19.   PIV SL.   Endorses chest pain w/ movement and with palpation. Pain very intense with any kind of movement but subsides w/ rest. Controlled w/ tylenol. IV dilaudid and oxy available.    VSS on RA. Afebrile.  On tele, NSR

## 2022-02-21 NOTE — PROGRESS NOTES
[facility]    History and Physical  Hospital Medicine       Date of Admission:  2/20/2022  Date of Service: 2/20/2022     Assessment & Plan   Marlene Millan is a 81 year old female with past medical history significant for PE after knee surgery in 2012 who now presents on 2/20/2022 with right-sided chest wall pain with some radiation to the back for the last 2 weeks since returning from [place].    Bilateral pulmonary embolism, subacute    Two weeks of right sided chest pain with movement, no pleuritic pain, no shortness of breath, no syncope. No exertional chest pain or shortness of breath. No lower extremity swelling but has had cramping in calves. H/o provoked PE in 2012 following TKA treated 3 months with warfarin. H/o COVID-19 late Nov 2021 with CT chest neg for PE. Plane ride from [place] to [place] 2/5/2022 and chest pain started shortly after this flight.     In the ED, CT chest angio positive for bilateral filling defects in segmental and subsegmental pulmonary arteries, consistent with pulmonary emboli. No evidence of right heart strain. Clot burden appears more prominent on left than right by my review of images. No evidence of pulmonary infarcts. No hypoxemia or hemodynamic changes. Enoxaparin 1 mg/kg subcutaneous given.  Small troponin elevation 103 and recheck is 94 with upper limits of normal being 54.  This is not as high as a troponin leak is she had for her PEs associate with right heart strain in 2012.   -Discussed anticoagulation options with patient, including warfarin with enoxaparin bridging which the patient experienced previously, Eliquis and Xarelto.  After discussion of risks and benefits, patient wishes to start Eliquis.   - Eliquis and Xarelto are both cost prohibitive ($442/month)  - Discussed coumadin with lovenox bridging; patient stated that in the past Coumadin is caused nausea and vomiting and she was transitioned to aspirin, she is willing again to  "try Coumadin with Lovenox bridging should this not suit her she will remain on full dose Lovenox, plan discussed with daughter  -Bilateral lower extremity Dopplers on 2/20/2022 are negative for DVT   -This point this appears to be provoked by the flight from [place] and there may possibly be some residual hypercoagulability from COVID-19 though it has been a couple months since her illness.  Recommend minimum 6 months treatment at this time.  -Age-appropriate cancer screening has been discussed with patient   -Avoid NSAIDs including baby aspirin    Right-sided chest pain and back pain    Pain is easily reproducible with even mild palpation of the chest wall the right and the back pain is also reproducible with moderate palpation across the upper mid back.  This seems to be musculoskeletal in nature.  I suspect this may be reactive to the pulmonary emboli with muscle spasms.  No known injury.   -Trial of scheduled acetaminophen, patient not interested in additional pain medication   -Expect will improve over next 1-2 weeks as pulmonary embolism resolves    Hypertension     Started amlodipine in January and not held the last week as felt chest pain was due to this. BP elevated markedly this admission.   - resume amlodipine 2.5 mg p.o. daily 2/21/2022    Hyperlipidemia     On atorvastatin   - continue atorvastatin    COVID-19 status  Vaccinated last July. H/o moderate case of COVID-19 pneumonia late November 2021 treated with monoclonal Ab. No booster due to needs to wait 90 days after monoclonal Ab therapy. SARS-CoV-2 PCR negative this admission.    Clinically Significant Risk Factors Present on Admission               # Platelet Defect: home medication list includes an antiplatelet medication   # Obesity: Estimated body mass index is 31.19 kg/m  as calculated from the following:    Height as of this encounter: 1.6 m (5' 3\").    Weight as of this encounter: 79.9 kg (176 lb 1.6 oz).        Diet: Low Saturated Fat Na " "<2400 mg    DVT Prophylaxis: Full dose Lovenox  Snowden Catheter: Not present  Central Lines: None  Code Status: Full Code Full         Disposition: DC in the a.m.        Claudette Tang MD  Hospital Medicine        Interval History:  Patient seen and examined at bedside needs to have right-sided chest pain with radiation to the back pleuritic in nature, sharp shooting, worse with activity.    Denies denies  Palpitations, lightheadedness or dizziness.  Denies nausea, vomiting.  Denies abdominal pain.  Eating and drinking without difficulty.    H&P reviewed.  Patient with previous history of venous thromboembolism after knee surgery.  She was initially placed on Coumadin and had some nausea vomiting with it was subsequently placed on aspirin.    Primary Care Physician   Katy Rodriguez [facility] [phone]    History is obtained from the patient who is a good historian and review of old records via the EMR.    History of Present Illness   Marlene Millan is a 81 year old female who presents with 2 weeks of right-sided chest pain radiating to mid-upper back. Symptoms began shortly after a flight back from [place] to [place] Feb 5.  Right chest pain has been sharp, 10 of 10, worse with getting up or sitting down but not with deep breaths or with walking. No shortness of breath. Today symptoms felt little worse and also felt some \"numbness\" in the right arm which made her think of her heart.  She is also been feeling more fatigued since returning home from [place].  No shortness of breath.  No lightheadedness.  No central chest pain.  No lower extremity edema/swelling but has had cramping in the calves at night last couple of weeks. Patient thought the chest pain and back pain might be due to amlodipine she started in January and has held the past week. No fever or chills. Patient had COVID-19 late November associated with shortness of breath and had CT chest 11/30/2021 that was negative for PE and positive for mild " scattered GGOs. Patient is vaccinated.     Review of Systems   The 10 point Review of Systems is negative other than noted in the HPI or here. Has had right arm pain in January though this is long-standing since a fall years ago. In January while in [place], was able to take long walks up to 6 miles without significant dyspnea on exertion but did then have more fatigue and arm discomfort in the evenings after the walks.  She had trouble swallowing foods and has an EGD scheduled for early March.    Past Medical History    Past Medical History:   Diagnosis Date     Basal cell carcinoma      Hypertension 6/7/2010     Macular degeneration      PONV (postoperative nausea and vomiting)      Pulmonary embolism, bilateral (H) 2/19/2012    [facility]-surgical at [facility] following total knee replacement      Shingles outbreak December 2016       Patent foramen ovale 02/27/2012     Priority: Medium     Incidental finding on transesophageal echocardiogram       GERD (gastroesophageal reflux disease) 02/25/2011     Priority: Medium     Controlled by diet       HYPERLIPIDEMIA LDL GOAL <130 10/31/2010     Priority: Medium     Spinal Stenosis of Lumbar Region 07/14/2008     Priority: Medium       Past Surgical History   Past Surgical History:   Procedure Laterality Date     basal cell cancer of nose  Oct 2012     BREAST SURGERY      breast reduction     CARPAL TUNNEL RELEASE RT/LT      right     COMBINED REPAIR PTOSIS WITH BLEPHAROPLASTY BILATERAL Bilateral 6/30/2015    Procedure: COMBINED REPAIR PTOSIS WITH BLEPHAROPLASTY BILATERAL;  Surgeon: Ilir Marvin MD;  Location: [facility]     HC REMOVAL GALLBLADDER       LIGATE VEIN VARICOSE, PHLEBECTOMY MULTIPLE STAB, COMBINED Bilateral 8/13/2015    Procedure: COMBINED LIGATE VEIN VARICOSE, PHLEBECTOMY MULTIPLE STAB;  Surgeon: Alphonse Pro MD;  Location: [facility] OR     ORTHOPEDIC SURGERY      bilateral knee     PHACOEMULSIFICATION WITH STANDARD INTRAOCULAR LENS IMPLANT Right  2017    Procedure: PHACOEMULSIFICATION WITH STANDARD INTRAOCULAR LENS IMPLANT;  Right Cataract Removal with Implant;  Surgeon: Clif Michel MD;  Location: [facility] OR     SURGICAL HISTORY OF -       breast reduction mammoplasty      SURGICAL HISTORY OF -       bilateral lower extremity vein stripping     SURGICAL HISTORY OF -   2010    left total knee arthroplasty        Prior to Admission Medications   Prior to Admission Medications   Prescriptions Last Dose Informant Patient Reported? Taking?   ASPIRIN 81 PO 2022 at am Self Yes Yes   Sig: Take 1 tablet by mouth daily   Cholecalciferol (VITAMIN D3) 25 MCG (1000 UT) CAPS Past Week at pm Self Yes Yes   Sig: Take 1 capsule by mouth daily   FIBER ADULT GUMMIES PO Past Month at pm Self Yes Yes   Sig: Take 2 chew tab by mouth daily   MULTIPLE VITAMINS PO Past Week at pm Self Yes Yes   Sig: Take by mouth daily Chewable - 2 of them each day   Multiple Vitamins-Minerals (HAIR SKIN AND NAILS FORMULA PO) Past Week at pm Self Yes Yes   Sig: Take 2 chew tab by mouth   Multiple Vitamins-Minerals (MACULAR VITAMIN BENEFIT PO) Past Week at pm Self Yes Yes   Sig: Take 1 tablet by mouth daily Focus MaculaPro   amLODIPine (NORVASC) 2.5 MG tablet 2022 at 0800 Self No Yes   Sig: Take 1 tablet (2.5 mg) by mouth daily   atorvastatin (LIPITOR) 40 MG tablet Past Week at pm Self No Yes   Sig: TAKE 1 TABLET (40 MG) BY MOUTH DAILY   naproxen sodium (ANAPROX) 220 MG tablet Past Week at Unknown time Self Yes Yes   Sig: Take 440 mg by mouth daily as needed for moderate pain      Facility-Administered Medications: None     Allergies   Allergies   Allergen Reactions     Sulfa Drugs Visual Disturbance     Affected eyesight       Family History    Family History   Problem Relation Age of Onset     Diabetes Father         last both legs to the disease     Heart Disease Father          age 91         Social History   Social History     Socioeconomic History     Marital  "status:      Spouse name: Not on file     Number of children: Not on file     Years of education: Not on file     Highest education level: Not on file   Occupational History     Real estate   Tobacco Use     Smoking status: Former Smoker     Years: 20.00     Types: Cigarettes     Quit date: 1982     Years since quittin.0     Smokeless tobacco: Never Used   Vaping Use     Vaping Use: Never used   Substance and Sexual Activity     Alcohol use: Yes     Comment: occ wine     Drug use: No     Sexual activity: Not Currently       Physical Exam   BP (!) 164/83 (BP Location: Left arm)   Pulse 63   Temp 97.4  F (36.3  C) (Oral)   Resp 16   Ht 1.6 m (5' 3\")   Wt 79.9 kg (176 lb 1.6 oz)   SpO2 96%   BMI 31.19 kg/m       Weight: 176 lbs 1.6 oz Body mass index is 31.19 kg/m .     Constitutional: Alert, oriented, cooperative, no apparent distress, appears nontoxic  Eyes: Eyes are clear, pupils are 4 mm and reactive.  HEENT: Oropharynx is clear and moist. No evidence of cranial trauma.  Lymph/Hematologic: No epitrochlear, axillary, anterior or posterior cervical, or supraclavicular lymphadenopathy is appreciated.  Cardiovascular: Regular rate and rhythm, normal S1 and S2, and no murmur noted. JVP is normal. Good peripheral pulses in wrists bilaterally. No lower extremity edema.  Respiratory: Clear to auscultation bilaterally.   GI: Soft, non-tender, normal bowel sounds  Genitourinary: Deferred  Musculoskeletal: Very tender right-sided chest wall to mild palpation and reproduces the chest pain. Also muscular tenderness across the mid upper back reproduces the back pain she has.  Skin: Warm and dry, no rashes.   Neurologic: Neck supple. Cranial nerves are grossly intact.  is symmetric.  Dorsiflexion is within normal limits    Data   Data reviewed today:   Recent Labs   Lab 22  1024   WBC 8.8   HGB 12.7   MCV 95      INR 0.94      POTASSIUM 4.1   CHLORIDE 105   CO2 29   BUN 23   CR 0.86 "   ANIONGAP 5   YESIKA 9.1   *       Recent Results (from the past 24 hour(s))   CT Chest Pulmonary Embolism w Contrast    Narrative    EXAM: CT CHEST PULMONARY EMBOLISM W CONTRAST  LOCATION: [facility]  DATE/TIME: 2/20/2022 12:34 PM    INDICATION: Chest pain, PE suspected, low/intermediate probability, positive D-dimer.  COMPARISON: Chest CTA on 11/30/2021.  TECHNIQUE: CT chest pulmonary angiogram during arterial phase injection of IV contrast. Multiplanar reformats and MIP reconstructions were performed. Dose reduction techniques were used.   CONTRAST: 75 mL Isovue 370.    FINDINGS:  ANGIOGRAM CHEST: Multiple filling defects within the segmental and subsegmental pulmonary arteries, consistent with pulmonary emboli. No evidence of right heart strain.    LUNGS AND PLEURA: Bibasilar pulmonary opacities, likely atelectasis.    MEDIASTINUM/AXILLAE: No cardiomegaly or significant pericardial effusion. No significant mediastinal, hilar or axillary lymphadenopathy.    CORONARY ARTERY CALCIFICATION: Moderate.    UPPER ABDOMEN: Limited evaluation of the upper abdomen due to lack of coverage and timing of contrast. Partially visualized at least 4.5 cm renal cyst at the upper pole left kidney. Multiple calcified splenic granulomas.    MUSCULOSKELETAL: Normal.      Impression    IMPRESSION:  1.  The study is positive for multiple bilateral pulmonary emboli. No evidence of right heart strain.  2.  Moderate atherosclerotic vascular calcification of the coronary arteries.   POC US ECHO LIMITED    [facility] Procedure Note     Limited Bedside ED Cardiac Ultrasound:    PROCEDURE: PERFORMED BY: Dr. Ventura Huntley MD  INDICATIONS/SYMPTOM:  Chest Pain and Elevated Troponin  PROBE: Cardiac phased array probe  BODY LOCATION: Chest  FINDINGS:   The ultrasound was performed utilizing the subcostal, parasternal long axis, parasternal short axis and apical 4 chamber views.  Cardiac  contractility:  Present  Gross estimation of cardiac kinesis: normal  Pericardial Effusion:  None  RV:LV ratio: LV > RV  IVC:    Diameter: IVC diameter expiration (IVCe) ~ 1 cm                                             IVC diameter inspiration (IVCi) > 50%                                                   Collapsibility:  IVC collapses > 50% with inspiration  INTERPRETATION: No evidence of right-sided heart strain, no septal bowing, chamber size and motion were grossly normal with LV > RV, normal cardiac kinesis.  No pericardial effusion was found.  IVC visualized and findings indicate normovolemia.  IMAGE DOCUMENTATION: Images were archived to PACs system.       US Lower Extremity Venous Duplex Bilateral    Narrative    EXAM: US LOWER EXTREMITY VENOUS DUPLEX BILATERAL  LOCATION: [facility]  DATE/TIME: 2/20/2022 7:28 PM    INDICATION: Acute PE, eval for LE clot burden, pain  COMPARISON: 05/04/2015  TECHNIQUE: Venous Duplex ultrasound of bilateral lower extremities with and without compression, augmentation and duplex. Color flow and spectral Doppler with waveform analysis performed.    FINDINGS: Exam includes the common femoral, femoral, popliteal veins as well as segmentally visualized deep calf veins and greater saphenous vein.     RIGHT: No deep vein thrombosis. No superficial thrombophlebitis. No popliteal cyst.    LEFT: No deep vein thrombosis. No superficial thrombophlebitis. No popliteal cyst.      Impression    IMPRESSION:  1.  No deep venous thrombosis in the bilateral lower extremities.

## 2022-02-21 NOTE — PLAN OF CARE
Patient is alert and oriented.  O2 sat is good on room air, patient denies shortness of breath, still having some pain with activity but that has improved today.  Able to ambulate in her room to the bathroom and sit up in chair.  Received eliquis this morning but out of pocket cost is too expensive to take at home after discharge, therefore patient will be started on lovenox and coumadin this evening and plan to monitor for tolerance to coumadin.

## 2022-02-21 NOTE — PROGRESS NOTES
Pt states her Ins will now cover her script for Eliquis of 2.5 or 5 mg tablets. Really does not want to take coumadin. Provider updated and awaiting response.

## 2022-02-21 NOTE — CONSULTS
Clinical Pharmacy - Warfarin Dosing Consult     Pharmacy has been consulted to manage this patient s warfarin therapy.  Indication: DVT/ PE Treatment  Therapy Goal: INR 2-3  Provider/Team: Dr. Katy Rodriguez/[facility]  OP Anticoag Clinic: [facility]  Warfarin Prior to Admission: Yes  Warfarin PTA Regimen: last dose was 5 mg MWF, 7.5 mg ROW (in 2012 for PE)  Recent documented change in oral intake/nutrition: Unknown  Dose Comments: 5 mg for INR = 1.4    INR   Date Value Ref Range Status   02/21/2022 1.40 (H) 0.85 - 1.15 Final   02/20/2022 0.94 0.85 - 1.15 Final       Recommend warfarin 5 mg today.  Pharmacy will monitor Marlene Millan daily and order warfarin doses to achieve specified goal.      Please contact pharmacy as soon as possible if the warfarin needs to be held for a procedure or if the warfarin goals change.      Katerin Fuentes, BerryD

## 2022-02-22 VITALS
RESPIRATION RATE: 18 BRPM | BODY MASS INDEX: 31.2 KG/M2 | OXYGEN SATURATION: 95 % | HEIGHT: 63 IN | TEMPERATURE: 98.2 F | WEIGHT: 176.1 LBS | DIASTOLIC BLOOD PRESSURE: 72 MMHG | SYSTOLIC BLOOD PRESSURE: 147 MMHG | HEART RATE: 67 BPM

## 2022-02-22 LAB
ANION GAP SERPL CALCULATED.3IONS-SCNC: 5 MMOL/L (ref 3–14)
APTT PPP: 32 SECONDS (ref 22–38)
BUN SERPL-MCNC: 21 MG/DL (ref 7–30)
CALCIUM SERPL-MCNC: 9 MG/DL (ref 8.5–10.1)
CHLORIDE BLD-SCNC: 106 MMOL/L (ref 94–109)
CO2 SERPL-SCNC: 28 MMOL/L (ref 20–32)
CREAT SERPL-MCNC: 0.78 MG/DL (ref 0.52–1.04)
ERYTHROCYTE [DISTWIDTH] IN BLOOD BY AUTOMATED COUNT: 13.7 % (ref 10–15)
GFR SERPL CREATININE-BSD FRML MDRD: 77 ML/MIN/1.73M2
GLUCOSE BLD-MCNC: 114 MG/DL (ref 70–99)
HCT VFR BLD AUTO: 38.2 % (ref 35–47)
HGB BLD-MCNC: 12.4 G/DL (ref 11.7–15.7)
INR PPP: 1.29 (ref 0.85–1.15)
MCH RBC QN AUTO: 29.2 PG (ref 26.5–33)
MCHC RBC AUTO-ENTMCNC: 32.5 G/DL (ref 31.5–36.5)
MCV RBC AUTO: 90 FL (ref 78–100)
PLATELET # BLD AUTO: 294 10E3/UL (ref 150–450)
POTASSIUM BLD-SCNC: 3.9 MMOL/L (ref 3.4–5.3)
RBC # BLD AUTO: 4.25 10E6/UL (ref 3.8–5.2)
SODIUM SERPL-SCNC: 139 MMOL/L (ref 133–144)
WBC # BLD AUTO: 7.4 10E3/UL (ref 4–11)

## 2022-02-22 PROCEDURE — 250N000013 HC RX MED GY IP 250 OP 250 PS 637: Performed by: INTERNAL MEDICINE

## 2022-02-22 PROCEDURE — 99217 PR OBSERVATION CARE DISCHARGE: CPT | Performed by: INTERNAL MEDICINE

## 2022-02-22 PROCEDURE — 80048 BASIC METABOLIC PNL TOTAL CA: CPT | Performed by: INTERNAL MEDICINE

## 2022-02-22 PROCEDURE — 85027 COMPLETE CBC AUTOMATED: CPT | Performed by: INTERNAL MEDICINE

## 2022-02-22 PROCEDURE — 36415 COLL VENOUS BLD VENIPUNCTURE: CPT | Performed by: INTERNAL MEDICINE

## 2022-02-22 PROCEDURE — 85610 PROTHROMBIN TIME: CPT | Performed by: INTERNAL MEDICINE

## 2022-02-22 PROCEDURE — 250N000013 HC RX MED GY IP 250 OP 250 PS 637: Performed by: HOSPITALIST

## 2022-02-22 PROCEDURE — G0378 HOSPITAL OBSERVATION PER HR: HCPCS

## 2022-02-22 PROCEDURE — 99207 PR CDG-CODE CATEGORY CHANGED: CPT | Performed by: INTERNAL MEDICINE

## 2022-02-22 PROCEDURE — 85730 THROMBOPLASTIN TIME PARTIAL: CPT | Performed by: INTERNAL MEDICINE

## 2022-02-22 RX ORDER — ACETAMINOPHEN 500 MG
1000 TABLET ORAL 3 TIMES DAILY
Qty: 30 TABLET | Refills: 0 | Status: SHIPPED | OUTPATIENT
Start: 2022-02-22 | End: 2022-04-06

## 2022-02-22 RX ORDER — APIXABAN 5 MG (74)
KIT ORAL
Qty: 1 EACH | Refills: 0 | Status: SHIPPED | OUTPATIENT
Start: 2022-02-22 | End: 2022-03-08

## 2022-02-22 RX ADMIN — AMLODIPINE BESYLATE 2.5 MG: 2.5 TABLET ORAL at 08:56

## 2022-02-22 RX ADMIN — APIXABAN 10 MG: 5 TABLET, FILM COATED ORAL at 08:55

## 2022-02-22 RX ADMIN — ACETAMINOPHEN 1000 MG: 500 TABLET, FILM COATED ORAL at 04:50

## 2022-02-22 RX ADMIN — ACETAMINOPHEN 1000 MG: 500 TABLET, FILM COATED ORAL at 11:21

## 2022-02-22 RX ADMIN — ATORVASTATIN CALCIUM 40 MG: 20 TABLET, FILM COATED ORAL at 08:55

## 2022-02-22 NOTE — PLAN OF CARE
[facility] DISCHARGE NOTE    Patient discharged to home at 12:50 PM via wheel chair. Accompanied by staff. Discharge instructions reviewed with patient, opportunity offered to ask questions. Prescriptions sent to patients preferred pharmacy. All belongings sent with patient.    Ana Wong RN

## 2022-02-22 NOTE — PLAN OF CARE
"Patient alert and oriented x4. Up independently in room. Denies N/V. Denies SOB. Patient had increased back/chest/shoulder pain overnight. Given scheduled tylenol and heat packs. Patient refused other PRN pain meds. On tele. Saline locked.     0800 tylenol given early per patient request.       BP (!) 165/71 (BP Location: Left arm)   Pulse 62   Temp 98  F (36.7  C) (Oral)   Resp 18   Ht 1.6 m (5' 3\")   Wt 79.9 kg (176 lb 1.6 oz)   SpO2 96%   BMI 31.19 kg/m                        "

## 2022-02-22 NOTE — DISCHARGE SUMMARY
[facility]    Discharge Summary  Hospital Medicine    Date of Admission:  2/20/2022  Date of Discharge:  2/22/2022   Discharging Provider: Claudette Tang  Date of Service: 2/22/2022      Primary Care     Katy Rodriguez  [id number]  [facility] [id number]      Identification and Chief Compaint: Marlene Millan is a 81 year old female who presented on 2/20/2022 with complaint of right-sided chest pain with radiation to the mid upper back.    Discharge Diagnoses       Pulmonary embolism (H)    * No resolved hospital problems. *          Discharge Disposition   Discharged to home    Discharge Orders      Follow-up and recommended labs and tests     Follow up with primary care provider, Katy Rodriguez, within 7 days for hospital follow- up and regarding new diagnosis.     Activity    Your activity upon discharge: activity as tolerated     Reason for your hospital stay    Pulmonary Embolism     Diet    Follow this diet upon discharge: Orders Placed This Encounter      Low Saturated Fat Na <2400 mg        Discharge Medications   Current Discharge Medication List      START taking these medications    Details   acetaminophen (TYLENOL) 500 MG tablet Take 2 tablets (1,000 mg) by mouth 3 times daily  Qty: 30 tablet, Refills: 0    Associated Diagnoses: Acute pulmonary embolism without acute cor pulmonale, unspecified pulmonary embolism type (H)      Apixaban Starter Pack (ELIQUIS DVT/PE STARTER PACK) 5 MG TBPK Take 10 mg by mouth 2 times daily for 7 days, THEN 5 mg 2 times daily for 23 days.  Qty: 1 each, Refills: 0    Associated Diagnoses: Acute pulmonary embolism without acute cor pulmonale, unspecified pulmonary embolism type (H)         CONTINUE these medications which have NOT CHANGED    Details   amLODIPine (NORVASC) 2.5 MG tablet Take 1 tablet (2.5 mg) by mouth daily  Qty: 90 tablet, Refills: 4    Associated Diagnoses: Essential hypertension      atorvastatin (LIPITOR)  40 MG tablet TAKE 1 TABLET (40 MG) BY MOUTH DAILY  Qty: 90 tablet, Refills: 4    Associated Diagnoses: Hyperlipidemia LDL goal <130      Cholecalciferol (VITAMIN D3) 25 MCG (1000 UT) CAPS Take 1 capsule by mouth daily      FIBER ADULT GUMMIES PO Take 2 chew tab by mouth daily      MULTIPLE VITAMINS PO Take by mouth daily Chewable - 2 of them each day      !! Multiple Vitamins-Minerals (HAIR SKIN AND NAILS FORMULA PO) Take 2 chew tab by mouth      !! Multiple Vitamins-Minerals (MACULAR VITAMIN BENEFIT PO) Take 1 tablet by mouth daily Focus MaculaPro       !! - Potential duplicate medications found. Please discuss with provider.      STOP taking these medications       ASPIRIN 81 PO Comments:   Reason for Stopping:         naproxen sodium (ANAPROX) 220 MG tablet Comments:   Reason for Stopping:             Allergies   Allergies   Allergen Reactions     Sulfa Drugs Visual Disturbance     Affected eyesight       Consultations This Hospital Stay   No consultations were requested during this admission    PHARMACY TO DOSE WARFARIN    Significant Results and Procedures   Procedures    None    Data   Results for orders placed or performed during the hospital encounter of 02/20/22   CT Chest Pulmonary Embolism w Contrast    Narrative    EXAM: CT CHEST PULMONARY EMBOLISM W CONTRAST  LOCATION: [facility]  DATE/TIME: 2/20/2022 12:34 PM    INDICATION: Chest pain, PE suspected, low/intermediate probability, positive D-dimer.  COMPARISON: Chest CTA on 11/30/2021.  TECHNIQUE: CT chest pulmonary angiogram during arterial phase injection of IV contrast. Multiplanar reformats and MIP reconstructions were performed. Dose reduction techniques were used.   CONTRAST: 75 mL Isovue 370.    FINDINGS:  ANGIOGRAM CHEST: Multiple filling defects within the segmental and subsegmental pulmonary arteries, consistent with pulmonary emboli. No evidence of right heart strain.    LUNGS AND PLEURA: Bibasilar pulmonary  opacities, likely atelectasis.    MEDIASTINUM/AXILLAE: No cardiomegaly or significant pericardial effusion. No significant mediastinal, hilar or axillary lymphadenopathy.    CORONARY ARTERY CALCIFICATION: Moderate.    UPPER ABDOMEN: Limited evaluation of the upper abdomen due to lack of coverage and timing of contrast. Partially visualized at least 4.5 cm renal cyst at the upper pole left kidney. Multiple calcified splenic granulomas.    MUSCULOSKELETAL: Normal.      Impression    IMPRESSION:  1.  The study is positive for multiple bilateral pulmonary emboli. No evidence of right heart strain.  2.  Moderate atherosclerotic vascular calcification of the coronary arteries.   POC US ECHO LIMITED    Narrative    [facility] Procedure Note     Limited Bedside ED Cardiac Ultrasound:    PROCEDURE: PERFORMED BY: Dr. Ventura Huntley MD  INDICATIONS/SYMPTOM:  Chest Pain and Elevated Troponin  PROBE: Cardiac phased array probe  BODY LOCATION: Chest  FINDINGS:   The ultrasound was performed utilizing the subcostal, parasternal long axis, parasternal short axis and apical 4 chamber views.  Cardiac contractility:  Present  Gross estimation of cardiac kinesis: normal  Pericardial Effusion:  None  RV:LV ratio: LV > RV  IVC:    Diameter: IVC diameter expiration (IVCe) ~ 1 cm                                             IVC diameter inspiration (IVCi) > 50%                                                   Collapsibility:  IVC collapses > 50% with inspiration  INTERPRETATION: No evidence of right-sided heart strain, no septal bowing, chamber size and motion were grossly normal with LV > RV, normal cardiac kinesis.  No pericardial effusion was found.  IVC visualized and findings indicate normovolemia.  IMAGE DOCUMENTATION: Images were archived to PACs system.       US Lower Extremity Venous Duplex Bilateral    Narrative    EXAM: US LOWER EXTREMITY VENOUS DUPLEX BILATERAL  LOCATION: [facility]  "CENTER  DATE/TIME: 2/20/2022 7:28 PM    INDICATION: Acute PE, eval for LE clot burden, pain  COMPARISON: 05/04/2015  TECHNIQUE: Venous Duplex ultrasound of bilateral lower extremities with and without compression, augmentation and duplex. Color flow and spectral Doppler with waveform analysis performed.    FINDINGS: Exam includes the common femoral, femoral, popliteal veins as well as segmentally visualized deep calf veins and greater saphenous vein.     RIGHT: No deep vein thrombosis. No superficial thrombophlebitis. No popliteal cyst.    LEFT: No deep vein thrombosis. No superficial thrombophlebitis. No popliteal cyst.      Impression    IMPRESSION:  1.  No deep venous thrombosis in the bilateral lower extremities.       History of Present Illness   Marlene Millan is a 81 year old female who presents with 2 weeks of right-sided chest pain radiating to mid-upper back. Symptoms began shortly after a flight back from [place] to [place] Feb 5.  Right chest pain has been sharp, 10 of 10, worse with getting up or sitting down but not with deep breaths or with walking. No shortness of breath. Today symptoms felt little worse and also felt some \"numbness\" in the right arm which made her think of her heart.  She is also been feeling more fatigued since returning home from [place].  No shortness of breath.  No lightheadedness.  No central chest pain.  No lower extremity edema/swelling but has had cramping in the calves at night last couple of weeks. Patient thought the chest pain and back pain might be due to amlodipine she started in January and has held the past week. No fever or chills. Patient had COVID-19 late November associated with shortness of breath and had CT chest 11/30/2021 that was negative for PE and positive for mild scattered GGOs. Patient is vaccinated.      Hospital Course   Marlene Millan is a 81 year old female with past medical history significant for PE after knee surgery in 2012 who now " presents on 2/20/2022 with right-sided chest wall pain with some radiation to the back for the last 2 weeks since returning from [place].    Bilateral pulmonary embolism, subacute    Two weeks of right sided chest pain with movement, no pleuritic pain, no shortness of breath, no syncope. No exertional chest pain or shortness of breath. No lower extremity swelling but has had cramping in calves. H/o provoked PE in 2012 following TKA treated 3 months with warfarin. H/o COVID-19 late Nov 2021 with CT chest neg for PE. Plane ride from [place] to [place] 2/5/2022 and chest pain started shortly after this flight.     In the ED, CT chest angio positive for bilateral filling defects in segmental and subsegmental pulmonary arteries, consistent with pulmonary emboli. No evidence of right heart strain. Clot burden appears more prominent on left than right by my review of images. No evidence of pulmonary infarcts. No hypoxemia or hemodynamic changes. Enoxaparin 1 mg/kg subcutaneous given.  Small troponin elevation 103 and recheck is 94 with upper limits of normal being 54.  This is not as high as a troponin leak is she had for her PEs associate with right heart strain in 2012.   -Discussed anticoagulation options with patient, including warfarin with enoxaparin bridging which the patient experienced previously, Eliquis and Xarelto.  After discussion of risks and benefits, patient wishes to start Eliquis.   - Eliquis and Xarelto are both cost prohibitive ($442/month)  - Discussed coumadin with lovenox bridging; patient stated that in the past Coumadin is caused nausea and vomiting and she was transitioned to aspirin, she is willing again to try Coumadin with Lovenox bridging should this not suit her she will remain on full dose Lovenox, plan discussed with daughter  - Family called insurance and patient will be able to afford Eliquis, changed to Eliquis on 2/21/2022, discharge home today with starter pack  -Age-appropriate  cancer screening was discussed with patient and further reevaluation in 6 months with primary care provider for discussion of long-term anticoagulation in the future was also discussed  -Bilateral lower extremity Dopplers on 2/20/2022 are negative for DVT   -This point this appears to be provoked by the flight from [place] and there may possibly be some residual hypercoagulability from COVID-19 though it has been a couple months since her illness.  Recommend minimum 6 months treatment at this time.  -Age-appropriate cancer screening has been discussed with patient   -Avoid NSAIDs including baby aspirin    Right-sided chest pain and back pain    Pain is easily reproducible with even mild palpation of the chest wall the right and the back pain is also reproducible with moderate palpation across the upper mid back.  This seems to be musculoskeletal in nature.  I suspect this may be reactive to the pulmonary emboli with muscle spasms.  No known injury.   -Trial of scheduled acetaminophen, patient not interested in additional pain medication   -Expect will improve over next 1-2 weeks as pulmonary embolism resolves    Hypertension     Started amlodipine in January and not held the last week as felt chest pain was due to this. BP elevated markedly this admission.   - resume amlodipine 2.5 mg p.o. daily 2/21/2022    Hyperlipidemia     On atorvastatin   - continue atorvastatin    COVID-19 status  Vaccinated last July. H/o moderate case of COVID-19 pneumonia late November 2021 treated with monoclonal Ab. No booster due to needs to wait 90 days after monoclonal Ab therapy. SARS-CoV-2 PCR negative this admission.      Pending Results   Unresulted Labs Ordered in the Past 30 Days of this Admission     No orders found from 1/21/2022 to 2/21/2022.          Physical Exam   Temp:  [97.4  F (36.3  C)-98.2  F (36.8  C)] 98.2  F (36.8  C)  Pulse:  [62-77] 67  Resp:  [16-18] 18  BP: (128-165)/(56-75) 147/72  SpO2:  [95 %-97 %] 95  %  Vitals:    02/20/22 1006 02/20/22 1550   Weight: 77.1 kg (170 lb) 79.9 kg (176 lb 1.6 oz)       Constitutional: awake alert and oriented  CV: RRR, +S1/s2, no m/r/g  Respiratory: CTA bilaterally  GI: Soft, nontender, NT, +BS  EXT; no edema, warm and well perfused  NEURO: able to move all 4 extremities  Skin: Warm and dry      The discharge plan was discussed with the patient and case management.  Total time on this discharge was 60 minutes.    Claudette Tang MD

## 2022-02-22 NOTE — PROGRESS NOTES
[facility] medicine cross cover    Patient and her daughter who is a CRNA discussed with her insurance company and learned that apixaban is actually covered (although our pharmacist looked at this and said it would cost e over $400???). She wants to go with that rather than warfarin. I have discontinued warfarin and Lovenox and reinitiated apixaban 10 mg twice daily, next dose this evening.      Patient does not feel comfortable discharging tonight and will discharge tomorrow if things go well.    James Juan MD  [facility] Medicine Service

## 2022-02-22 NOTE — PLAN OF CARE
"Goal Outcome Evaluation:    Patient alert/oriented. Independent. C/o stabbing right chest pain that radiates to her right arm. Back feels better today compared to yesterday. Heat helps. Scheduled Tylenol given. Patient refuses narcotics. Plan is to discharge home today. VSS. BP (!) 147/72 (BP Location: Right arm)   Pulse 67   Temp 98.2  F (36.8  C) (Oral)   Resp 18   Ht 1.6 m (5' 3\")   Wt 79.9 kg (176 lb 1.6 oz)   SpO2 95%   BMI 31.19 kg/m    Ana Wong RN on 2/22/2022 at 10:09 AM                            "

## 2022-02-23 ENCOUNTER — PATIENT OUTREACH (OUTPATIENT)
Dept: CARE COORDINATION | Facility: CLINIC | Age: 82
End: 2022-02-23
Payer: COMMERCIAL

## 2022-02-23 DIAGNOSIS — Z71.89 OTHER SPECIFIED COUNSELING: ICD-10-CM

## 2022-02-23 NOTE — PROGRESS NOTES
Clinic Care Coordination Contact  [facility]/Voicemail    Clinical Data: Care Coordinator Outreach  Outreach attempted x 1. Left message on patient's voicemail with call back information and requested return call.     Plan:Care Coordinator will try to reach patient again in 1-2 business days.    SHANITA Alcaraz  [phone]  [facility]

## 2022-02-24 NOTE — PROGRESS NOTES
Clinic Care Coordination Contact  [facility]/Voicemail    Clinical Data: Care Coordinator Outreach  Outreach attempted x 2. Left message on patient's voicemail with call back information and requested return call.    Plan:Care Coordinator will do no further outreaches at this time.    SHANITA Alcaraz  [phone]  [facility]

## 2022-02-28 ENCOUNTER — OFFICE VISIT (OUTPATIENT)
Dept: FAMILY MEDICINE | Facility: CLINIC | Age: 82
End: 2022-02-28
Payer: COMMERCIAL

## 2022-02-28 VITALS
SYSTOLIC BLOOD PRESSURE: 146 MMHG | DIASTOLIC BLOOD PRESSURE: 78 MMHG | OXYGEN SATURATION: 100 % | RESPIRATION RATE: 16 BRPM | HEART RATE: 69 BPM | TEMPERATURE: 97.7 F

## 2022-02-28 DIAGNOSIS — I26.99 ACUTE PULMONARY EMBOLISM WITHOUT ACUTE COR PULMONALE, UNSPECIFIED PULMONARY EMBOLISM TYPE (H): Primary | ICD-10-CM

## 2022-02-28 PROCEDURE — 99495 TRANSJ CARE MGMT MOD F2F 14D: CPT | Performed by: NURSE PRACTITIONER

## 2022-02-28 NOTE — PROGRESS NOTES
Assessment & Plan     Acute pulmonary embolism without acute cor pulmonale, unspecified pulmonary embolism type (H)    - Adult Oncology/Hematology Referral; Future             CONSULTATION/REFERRAL to HEMATOLOGY     FUTURE APPOINTMENTS:       - Follow-up for annual visit or as needed  See Patient Instructions    No follow-ups on file.    NOBLE Sepulveda CNP  [facility]    Pablo Weller is a 81 year old who presents for the following health issues     HPI       Hospital Follow-up Visit:    Hospital/Nursing Home/ Rehab Facility: [facility]  Date of Admission: 2/20/22  Date of Discharge: 2/22/22  Reason(s) for Admission: pulmonary embolism, previous PE in 2012 around the time of a knee replacement surgery.       Was your hospitalization related to COVID-19? No Did have COVID pneumonia end of November 2021  Problems taking medications regularly:  No- stopped the Amlodipine for 7 days- just got back on it  Medication changes since discharge: start tylenol and eliquis, stop ASA and naproxen  Problems adhering to non-medication therapy:  None    Summary of hospitalization:  [facility] discharge summary reviewed  Diagnostic Tests/Treatments reviewed.  Follow up needed: none  Other Healthcare Providers Involved in Patient s Care:         None  Update since discharge: improved.       Post Discharge Medication Reconciliation: discharge medications reconciled, continue medications without change.  Plan of care communicated with patient                Review of Systems   Constitutional, HEENT, cardiovascular, pulmonary, gi and gu systems are negative, except as otherwise noted.      Objective    BP (!) 146/78   Pulse 69   Temp 97.7  F (36.5  C) (Tympanic)   Resp 16   SpO2 100%   There is no height or weight on file to calculate BMI.  Physical Exam   GENERAL: healthy, alert and no distress  RESP: lungs clear to auscultation - no rales,  rhonchi or wheezes  CV: regular rates and rhythm, normal S1 S2, no S3 or S4, no murmur, click or rub and no peripheral edema  MS: no gross musculoskeletal defects noted, no edema  NEURO: Normal strength and tone, mentation intact and speech normal

## 2022-02-28 NOTE — PATIENT INSTRUCTIONS
Patient Education     Pulmonary Embolism (PE)  A pulmonary embolus is most often due to a blood clot that develops in a deep vein of the leg (deep vein thrombosis). If that clot breaks loose and travels to the lung, it's called a pulmonary embolism (PE). This can cut off blood flow in the lungs.   A blood clot in the lungs is a medical emergency and may cause death.    Healthcare providers use the term venous thromboembolism (VTE) to describe these 2 conditions: deep vein thrombosis and pulmonary embolism. They use the term VTE because the 2 conditions are very closely related. And, because their prevention and treatment are also closely related.       A pulmonary embolism occurs when a blood clot forms in a vein and travels to the lungs.   How is pulmonary embolism diagnosed?   Your healthcare provider examines you and asks about your symptoms and health history. You may also have one or more of the following:     Blood tests to check for blood clotting or other problems    Imaging tests to look for clots in the veins or lung    Electrocardiography (ECG) to test how well the heart is working  How is pulmonary embolism treated?    Blood-thinning medicines (anticoagulants). These medicines thin the blood. They may be given as a pill, as an injection, or through a tube into a vein (intravenous or IV). Blood thinners help prevent more blood clots from forming. They also help to prevent an existing clot from getting larger.    Thrombolysis. Thrombolytic medicines are used to quickly dissolve a blood clot. A long, narrow tube (catheter) is used to deliver medicine directly to the clot. Thrombolytic medicines increase the risk of bleeding so they are used very carefully.    Inferior vena cava (IVC) filter surgery. The vena cava is the body s largest vein. It carries blood from the body to the heart. A small filter traps blood clots in the lower body and prevents them from traveling to the lungs. The filter is inserted  into the vein through a catheter. The filter may be used if blood thinners can't be taken or if they don't work.     Pulmonary embolectomy. This is a procedure to remove a blood clot in the lungs. It may be done with surgery or with a catheter inserted in the body. It may be done when other treatments aren't safe or don't work.    What are the long-term concerns?  With treatment, blood clots are usually dissolved or removed. Some treatments can even help prevent future clots. But having a PE can put you at risk for another life-threatening blood clot. So you will likely need to take anticoagulants to help keep blood clots from forming again. You may need to take this medicine for months or years.   You may also need to make lifestyle changes. This may include getting more active and eating healthier. You may need to wear elastic (compression) stockings and take breaks on long trips.   Call 911  Call 911 or get emergency help if you have:     Heavy or uncontrolled bleeding    Symptoms of a blood clot that has traveled to the lungs. The symptoms include:  ? Chest pain  ? Trouble breathing  ? Coughing (may cough up blood)  ? Fainting  ? Fast heartbeat  ? Sweating    When to call your healthcare provider  Call your healthcare provider if you have swelling or pain in your leg, arm, or other area. These are symptoms of a blood clot.   You may have bleeding if you take medicine to help prevent blood clots.   Call your healthcare provider if you have symptoms of bleeding. This includes:     Blood in the urine    Bleeding with bowel movements    Bleeding from the nose, gums, a cut, or vagina  [employer] last reviewed this educational content on 2/1/2020 2000-2021 [employer]. All rights reserved. This information is not intended as a substitute for professional medical care. Always follow your healthcare professional's instructions.

## 2022-03-07 ENCOUNTER — ANESTHESIA EVENT (OUTPATIENT)
Dept: GASTROENTEROLOGY | Facility: CLINIC | Age: 82
End: 2022-03-07
Payer: COMMERCIAL

## 2022-03-07 RX ORDER — LIDOCAINE 40 MG/G
CREAM TOPICAL
Status: CANCELLED | OUTPATIENT
Start: 2022-03-07

## 2022-03-07 RX ORDER — SODIUM CHLORIDE, SODIUM LACTATE, POTASSIUM CHLORIDE, CALCIUM CHLORIDE 600; 310; 30; 20 MG/100ML; MG/100ML; MG/100ML; MG/100ML
INJECTION, SOLUTION INTRAVENOUS CONTINUOUS
Status: CANCELLED | OUTPATIENT
Start: 2022-03-07

## 2022-03-07 NOTE — ANESTHESIA PREPROCEDURE EVALUATION
Anesthesia Pre-Procedure Evaluation    Patient: Marlene Millan   MRN: [id number] : 1940        Procedure : Procedure(s):  ESOPHAGOGASTRODUODENOSCOPY (EGD)          Past Medical History:   Diagnosis Date     Basal cell carcinoma      Hypertension 2010     Macular degeneration      PONV (postoperative nausea and vomiting)      Pulmonary embolism (H)      Pulmonary embolism, bilateral (H) 2012    Post-surgical at [facility] following total knee replacement      Shingles outbreak 2016      Past Surgical History:   Procedure Laterality Date     basal cell cancer of nose  Oct 2012     BREAST SURGERY      breast reduction     CARPAL TUNNEL RELEASE RT/LT      right     COMBINED REPAIR PTOSIS WITH BLEPHAROPLASTY BILATERAL Bilateral 2015    Procedure: COMBINED REPAIR PTOSIS WITH BLEPHAROPLASTY BILATERAL;  Surgeon: Ilir Marvin MD;  Location: [facility]     HC REMOVAL GALLBLADDER       LIGATE VEIN VARICOSE, PHLEBECTOMY MULTIPLE STAB, COMBINED Bilateral 2015    Procedure: COMBINED LIGATE VEIN VARICOSE, PHLEBECTOMY MULTIPLE STAB;  Surgeon: lAphonse Pro MD;  Location: [facility] OR     ORTHOPEDIC SURGERY      bilateral knee     PHACOEMULSIFICATION WITH STANDARD INTRAOCULAR LENS IMPLANT Right 2017    Procedure: PHACOEMULSIFICATION WITH STANDARD INTRAOCULAR LENS IMPLANT;  Right Cataract Removal with Implant;  Surgeon: Clif Michel MD;  Location: [facility] OR     SURGICAL HISTORY OF -       breast reduction mammoplasty      SURGICAL HISTORY OF -       bilateral lower extremity vein stripping     SURGICAL HISTORY OF -   2010    left total knee arthroplasty      Allergies   Allergen Reactions     Sulfa Drugs Visual Disturbance     Affected eyesight      Social History     Tobacco Use     Smoking status: Former Smoker     Years: 20.00     Types: Cigarettes     Quit date: 1982     Years since quittin.0     Smokeless tobacco: Never Used   Substance Use Topics      Alcohol use: Yes     Comment: occ wine      Wt Readings from Last 1 Encounters:   02/20/22 79.9 kg (176 lb 1.6 oz)        Anesthesia Evaluation   Pt has had prior anesthetic. Type: General.    History of anesthetic complications  - PONV.      ROS/MED HX  ENT/Pulmonary:     (+) asthma     Neurologic:       Cardiovascular:     (+) Dyslipidemia hypertension-----    METS/Exercise Tolerance:     Hematologic:     (+) History of blood clots,     Musculoskeletal:       GI/Hepatic:     (+) GERD,     Renal/Genitourinary:       Endo:       Psychiatric/Substance Use:       Infectious Disease:       Malignancy:       Other:               OUTSIDE LABS:  CBC:   Lab Results   Component Value Date    WBC 7.4 02/22/2022    WBC 8.8 02/20/2022    HGB 12.4 02/22/2022    HGB 12.7 02/20/2022    HCT 38.2 02/22/2022    HCT 35.8 02/20/2022     02/22/2022     02/20/2022     BMP:   Lab Results   Component Value Date     02/22/2022     02/20/2022    POTASSIUM 3.9 02/22/2022    POTASSIUM 4.1 02/20/2022    CHLORIDE 106 02/22/2022    CHLORIDE 105 02/20/2022    CO2 28 02/22/2022    CO2 29 02/20/2022    BUN 21 02/22/2022    BUN 23 02/20/2022    CR 0.78 02/22/2022    CR 0.86 02/20/2022     (H) 02/22/2022     (H) 02/20/2022     COAGS:   Lab Results   Component Value Date    PTT 32 02/22/2022    INR 1.29 (H) 02/22/2022     POC: No results found for: BGM, HCG, HCGS  HEPATIC:   Lab Results   Component Value Date    ALBUMIN 3.7 11/30/2021    PROTTOTAL 8.1 11/30/2021    ALT 31 11/30/2021    AST 24 11/30/2021    ALKPHOS 108 11/30/2021    BILITOTAL 0.4 11/30/2021     OTHER:   Lab Results   Component Value Date    YESIKA 9.0 02/22/2022    LIPASE 79 11/30/2021               NOBLE Cary CRNA

## 2022-03-08 DIAGNOSIS — I26.99 ACUTE PULMONARY EMBOLISM WITHOUT ACUTE COR PULMONALE, UNSPECIFIED PULMONARY EMBOLISM TYPE (H): ICD-10-CM

## 2022-03-08 RX ORDER — APIXABAN 5 MG (74)
5 KIT ORAL 2 TIMES DAILY
Qty: 46 EACH | Refills: 0 | Status: SHIPPED | OUTPATIENT
Start: 2022-03-08 | End: 2022-03-30

## 2022-03-10 ENCOUNTER — ANESTHESIA (OUTPATIENT)
Dept: GASTROENTEROLOGY | Facility: CLINIC | Age: 82
End: 2022-03-10
Payer: COMMERCIAL

## 2022-03-12 DIAGNOSIS — I10 ESSENTIAL HYPERTENSION: ICD-10-CM

## 2022-03-14 RX ORDER — AMLODIPINE BESYLATE 2.5 MG/1
2.5 TABLET ORAL DAILY
Qty: 30 TABLET | Refills: 1 | OUTPATIENT
Start: 2022-03-14

## 2022-03-25 DIAGNOSIS — E78.5 HYPERLIPIDEMIA LDL GOAL <130: ICD-10-CM

## 2022-03-25 DIAGNOSIS — I10 ESSENTIAL HYPERTENSION: ICD-10-CM

## 2022-03-25 DIAGNOSIS — I26.99 ACUTE PULMONARY EMBOLISM WITHOUT ACUTE COR PULMONALE, UNSPECIFIED PULMONARY EMBOLISM TYPE (H): ICD-10-CM

## 2022-03-25 NOTE — TELEPHONE ENCOUNTER
Reason for Call:  Other prescription    Detailed comments: Patient that we have to call [facility] so the patient can get 90 day supply. I tried to explain to patient that the provider did not send 90 day supply that's why she didn't get one. She said well send the message and let the doctor decide.   The number is  [phone]    Phone Number Patient can be reached at: Home number on file [phone] (home)    Best Time: any    Can we leave a detailed message on this number? YES    Call taken on 3/25/2022 at 4:37 PM by Karen Laureano

## 2022-03-28 NOTE — TELEPHONE ENCOUNTER
"Requested Prescriptions   Pending Prescriptions Disp Refills    atorvastatin (LIPITOR) 40 MG tablet 90 tablet 4     Sig: Take 1 tablet (40 mg) by mouth daily        Statins Protocol Failed - 3/28/2022 10:16 AM        Failed - LDL on file in past 12 months     Recent Labs   Lab Test 10/21/20  1144   *               Passed - No abnormal creatine kinase in past 12 months     No lab results found.             Passed - Recent (12 mo) or future (30 days) visit within the authorizing provider's specialty     Patient has had an office visit with the authorizing provider or a provider within the authorizing providers department within the previous 12 mos or has a future within next 30 days. See \"Patient Info\" tab in inbasket, or \"Choose Columns\" in Meds & Orders section of the refill encounter.              Passed - Medication is active on med list        Passed - Patient is age 18 or older        Passed - No active pregnancy on record        Passed - No positive pregnancy test in past 12 months           amLODIPine (NORVASC) 2.5 MG tablet 90 tablet 4     Sig: Take 1 tablet (2.5 mg) by mouth daily        Calcium Channel Blockers Protocol  Failed - 3/28/2022 10:16 AM        Failed - Blood pressure under 140/90 in past 12 months       BP Readings from Last 3 Encounters:   02/28/22 (!) 146/78   02/22/22 (!) 147/72   02/10/22 120/64                 Passed - Recent (12 mo) or future (30 days) visit within the authorizing provider's specialty     Patient has had an office visit with the authorizing provider or a provider within the authorizing providers department within the previous 12 mos or has a future within next 30 days. See \"Patient Info\" tab in inbasket, or \"Choose Columns\" in Meds & Orders section of the refill encounter.              Passed - Medication is active on med list        Passed - Patient is age 18 or older        Passed - No active pregnancy on record        Passed - Normal serum creatinine on file in " past 12 months     Recent Labs   Lab Test 02/22/22  0449   CR 0.78       Ok to refill medication if creatinine is low          Passed - No positive pregnancy test in past 12 months           Apixaban Starter Pack (ELIQUIS DVT/PE STARTER PACK) 5 MG TBPK 46 each 0     Sig: Take 5 mg by mouth 2 times daily        Direct Oral Anticoagulant Agents Passed - 3/28/2022 10:16 AM        Passed - Normal Platelets on file in past 12 months       Recent Labs   Lab Test 02/22/22  0449                  Passed - Medication is active on med list        Passed - Patient is 18-79 years of age        Passed - Serum creatinine less than or equal to 1.4 on file in past 12 mos     Recent Labs   Lab Test 02/22/22  0449   CR 0.78       Ok to refill medication if creatinine is low          Passed - Weight is greater than 60 kg for the past year       Wt Readings from Last 3 Encounters:   02/20/22 79.9 kg (176 lb 1.6 oz)   11/30/21 76.2 kg (168 lb)   10/21/20 77.6 kg (171 lb)             Passed - No active pregnancy on record        Passed - No positive pregnancy test within past 12 months        Passed - Recent (6 mo) or future (30 days) visit within the authorizing provider's specialty

## 2022-03-28 NOTE — TELEPHONE ENCOUNTER
Pt returned call - She needs 90-day supply Rxs for Eliquis, Atorvastatin & Amlodipine @ [facility].  No need to call patient back, unless there are questions or problems.

## 2022-03-28 NOTE — TELEPHONE ENCOUNTER
This writer attempted to contact Janee on 03/28/22      Reason for call Which prescription medication is patient needing the 90 day supply for and left message to give a return call to [phone].      If patient calls back:   Which medication is patient wanting to be dispensed as a 90 day supply?        Araceli Moss RN BSN PHN

## 2022-03-30 RX ORDER — ATORVASTATIN CALCIUM 40 MG/1
40 TABLET, FILM COATED ORAL DAILY
Qty: 90 TABLET | Refills: 0 | Status: SHIPPED | OUTPATIENT
Start: 2022-03-30 | End: 2022-04-06

## 2022-03-30 RX ORDER — APIXABAN 5 MG (74)
5 KIT ORAL 2 TIMES DAILY
Qty: 180 EACH | Refills: 0 | Status: SHIPPED | OUTPATIENT
Start: 2022-03-30 | End: 2022-06-16

## 2022-03-30 RX ORDER — AMLODIPINE BESYLATE 2.5 MG/1
2.5 TABLET ORAL DAILY
Qty: 90 TABLET | Refills: 0 | Status: SHIPPED | OUTPATIENT
Start: 2022-03-30 | End: 2022-04-06

## 2022-03-30 NOTE — TELEPHONE ENCOUNTER
Last physical 10/2020. Due for office visit and labs. Refilled x 90 days.  Needs to be seen for further refills.  We can address plan for Eliquis at that time as well.  I see she had a blood clot to her lungs so should be on treatment typically for at least 3 months and we can discuss whether that is indeed an appropriate plan for her or not at her visit.

## 2022-04-05 ENCOUNTER — TELEPHONE (OUTPATIENT)
Dept: FAMILY MEDICINE | Facility: CLINIC | Age: 82
End: 2022-04-05
Payer: COMMERCIAL

## 2022-04-05 NOTE — TELEPHONE ENCOUNTER
Reason for call:  Patient reporting a symptom    Symptom or request: [facility] pharmacy calling to speak to provider or RN regarding pt's Eliquis Rx.  Please call pharmacy @ [phone] and use reference #  [id number] and advise.      Duration (how long have symptoms been present): ongoing    Have you been treated for this before? Yes    Additional comments:     Call taken on 4/5/2022 at 10:27 AM by Kati Muhammad

## 2022-04-05 NOTE — TELEPHONE ENCOUNTER
RN called pharmacy back and read them the prescription that was sent to verify.    Patient should be on the maintenance dose of Eliquis 5mg bid. Sending to Provider to verify.     Araceli Moss RN BSN PHN

## 2022-04-06 ENCOUNTER — OFFICE VISIT (OUTPATIENT)
Dept: FAMILY MEDICINE | Facility: CLINIC | Age: 82
End: 2022-04-06
Payer: COMMERCIAL

## 2022-04-06 VITALS
TEMPERATURE: 97.5 F | HEART RATE: 69 BPM | RESPIRATION RATE: 16 BRPM | WEIGHT: 172 LBS | OXYGEN SATURATION: 98 % | BODY MASS INDEX: 30.48 KG/M2 | SYSTOLIC BLOOD PRESSURE: 138 MMHG | HEIGHT: 63 IN | DIASTOLIC BLOOD PRESSURE: 80 MMHG

## 2022-04-06 DIAGNOSIS — Z13.220 LIPID SCREENING: ICD-10-CM

## 2022-04-06 DIAGNOSIS — E78.5 HYPERLIPIDEMIA LDL GOAL <130: ICD-10-CM

## 2022-04-06 DIAGNOSIS — I10 ESSENTIAL HYPERTENSION: ICD-10-CM

## 2022-04-06 DIAGNOSIS — Z23 HIGH PRIORITY FOR 2019-NCOV VACCINE: ICD-10-CM

## 2022-04-06 DIAGNOSIS — Z11.59 NEED FOR HEPATITIS C SCREENING TEST: ICD-10-CM

## 2022-04-06 DIAGNOSIS — B00.9 HSV (HERPES SIMPLEX VIRUS) INFECTION: ICD-10-CM

## 2022-04-06 DIAGNOSIS — I26.99 ACUTE PULMONARY EMBOLISM WITHOUT ACUTE COR PULMONALE, UNSPECIFIED PULMONARY EMBOLISM TYPE (H): ICD-10-CM

## 2022-04-06 DIAGNOSIS — Z00.00 ENCOUNTER FOR MEDICARE ANNUAL WELLNESS EXAM: Primary | ICD-10-CM

## 2022-04-06 PROCEDURE — 99214 OFFICE O/P EST MOD 30 MIN: CPT | Mod: 25 | Performed by: FAMILY MEDICINE

## 2022-04-06 PROCEDURE — 99397 PER PM REEVAL EST PAT 65+ YR: CPT | Mod: 25 | Performed by: FAMILY MEDICINE

## 2022-04-06 PROCEDURE — 91305 COVID-19,PF,PFIZER (12+ YRS): CPT | Performed by: FAMILY MEDICINE

## 2022-04-06 PROCEDURE — 0054A COVID-19,PF,PFIZER (12+ YRS): CPT | Performed by: FAMILY MEDICINE

## 2022-04-06 RX ORDER — VALACYCLOVIR HYDROCHLORIDE 1 G/1
TABLET, FILM COATED ORAL
Qty: 12 TABLET | Refills: 4 | Status: SHIPPED | OUTPATIENT
Start: 2022-04-06 | End: 2023-03-28

## 2022-04-06 RX ORDER — AMLODIPINE BESYLATE 2.5 MG/1
2.5 TABLET ORAL DAILY
Qty: 90 TABLET | Refills: 4 | Status: SHIPPED | OUTPATIENT
Start: 2022-04-06 | End: 2022-10-21

## 2022-04-06 RX ORDER — APIXABAN 5 MG (74)
5 KIT ORAL 2 TIMES DAILY
Qty: 180 EACH | Refills: 0 | Status: CANCELLED | OUTPATIENT
Start: 2022-04-06

## 2022-04-06 RX ORDER — ATORVASTATIN CALCIUM 40 MG/1
40 TABLET, FILM COATED ORAL DAILY
Qty: 90 TABLET | Refills: 4 | Status: SHIPPED | OUTPATIENT
Start: 2022-04-06 | End: 2023-08-01

## 2022-04-06 ASSESSMENT — ENCOUNTER SYMPTOMS
SHORTNESS OF BREATH: 0
HEMATURIA: 0
CONSTIPATION: 0
ARTHRALGIAS: 0
FEVER: 0
PALPITATIONS: 0
ABDOMINAL PAIN: 0
HEARTBURN: 1
NAUSEA: 0
JOINT SWELLING: 0
DYSURIA: 0
MYALGIAS: 0
HEADACHES: 0
DIARRHEA: 0
WEAKNESS: 0
PARESTHESIAS: 0
SORE THROAT: 0
COUGH: 0
NERVOUS/ANXIOUS: 0
FREQUENCY: 0
DIZZINESS: 0
CHILLS: 0
EYE PAIN: 0
BREAST MASS: 0

## 2022-04-06 ASSESSMENT — ACTIVITIES OF DAILY LIVING (ADL): CURRENT_FUNCTION: NO ASSISTANCE NEEDED

## 2022-04-06 ASSESSMENT — PAIN SCALES - GENERAL: PAINLEVEL: NO PAIN (0)

## 2022-04-06 NOTE — PROGRESS NOTES
"SUBJECTIVE:   Marlene Millan is a 81 year old female who presents for Preventive Visit.      Patient has been advised of split billing requirements and indicates understanding: Yes  Are you in the first 12 months of your Medicare coverage?  No    Healthy Habits:     In general, how would you rate your overall health?  Good    Frequency of exercise:  2-3 days/week    Duration of exercise:  Other    Do you usually eat at least 4 servings of fruit and vegetables a day, include whole grains    & fiber and avoid regularly eating high fat or \"junk\" foods?  No    Taking medications regularly:  No    Barriers to taking medications:  Problems remembering to take them    Medication side effects:  None    Ability to successfully perform activities of daily living:  No assistance needed    Home Safety:  No safety concerns identified    Hearing Impairment:  No hearing concerns    In the past 6 months, have you been bothered by leaking of urine?  No    In general, how would you rate your overall mental or emotional health?  Good      PHQ-2 Total Score: 0    Additional concerns today:  Yes (She has gotten two cold sores in the past 6 months. She would like a prescriptipon for this.  )    Do you feel safe in your environment? Yes    Have you ever done Advance Care Planning? (For example, a Health Directive, POLST, or a discussion with a medical provider or your loved ones about your wishes): Yes, advance care planning is on file.      Fall risk  Fallen 2 or more times in the past year?: No  Any fall with injury in the past year?: No  Cognitive Screening   1) Repeat 3 items (Leader, Season, Table)    2) Clock draw: Normal  3) 3 item recall: Recalls 3 objects  Results: 3 items recalled: COGNITIVE IMPAIRMENT LESS LIKELY    Mini-CogTM Copyright LOUISA Vanegas. Licensed by the author for use in [facility]; reprinted with permission (aura@.[facility]). All rights reserved.      Do you have sleep apnea, excessive snoring or daytime " drowsiness?: no    Reviewed and updated as needed this visit by clinical staff   Tobacco  Allergies  Meds  Problems  Med Hx  Surg Hx  Fam Hx  Soc   Hx          Reviewed and updated as needed this visit by Provider   Tobacco  Allergies  Meds  Problems  Med Hx  Surg Hx  Fam Hx           Social History     Tobacco Use     Smoking status: Former Smoker     Years: 20.00     Types: Cigarettes     Quit date: 1982     Years since quittin.1     Smokeless tobacco: Never Used   Substance Use Topics     Alcohol use: Yes     Comment: occ wine     If you drink alcohol do you typically have >3 drinks per day or >7 drinks per week? No    Alcohol Use 2022   Prescreen: >3 drinks/day or >7 drinks/week? No   Prescreen: >3 drinks/day or >7 drinks/week? -           Hyperlipidemia Follow-Up      Are you regularly taking any medication or supplement to lower your cholesterol?   Yes- Lipitor     Are you having muscle aches or other side effects that you think could be caused by your cholesterol lowering medication?  No    Hypertension Follow-up      Do you check your blood pressure regularly outside of the clinic? Yes at home     Are you following a low salt diet? Yes    Are your blood pressures ever more than 140 on the top number (systolic) OR more   than 90 on the bottom number (diastolic), for example 140/90? Yes      Current providers sharing in care for this patient include:   Patient Care Team:  Katy Rodriguez MD as PCP - General (Family Practice)  Katy Rodriguez MD as Assigned PCP  Errol Anderson MD as MD (Dermatology)    The following health maintenance items are reviewed in Epic and correct as of today:  Health Maintenance Due   Topic Date Due     HEPATITIS C SCREENING  Never done     ZOSTER IMMUNIZATION (1 of 2) Never done     INFLUENZA VACCINE (1) 2021     Labs reviewed in EPIC  Patient Active Problem List   Diagnosis     Macular degeneration     Spinal stenosis,  lumbar region, without neurogenic claudication     Essential hypertension     HYPERLIPIDEMIA LDL GOAL <130     GERD (gastroesophageal reflux disease)     Patent foramen ovale     Advanced directives, counseling/discussion     [facility]     ASD (atrial septal defect)     Senile nuclear cataract     Acute pulmonary embolism without acute cor pulmonale, unspecified pulmonary embolism type (H)     HSV (herpes simplex virus) infection     Past Surgical History:   Procedure Laterality Date     basal cell cancer of nose  Oct 2012     BREAST SURGERY      breast reduction     CARPAL TUNNEL RELEASE RT/LT      right     COMBINED REPAIR PTOSIS WITH BLEPHAROPLASTY BILATERAL Bilateral 2015    Procedure: COMBINED REPAIR PTOSIS WITH BLEPHAROPLASTY BILATERAL;  Surgeon: Ilir Marvin MD;  Location:  [facility]     HC REMOVAL GALLBLADDER       LIGATE VEIN VARICOSE, PHLEBECTOMY MULTIPLE STAB, COMBINED Bilateral 2015    Procedure: COMBINED LIGATE VEIN VARICOSE, PHLEBECTOMY MULTIPLE STAB;  Surgeon: Alphonse Pro MD;  Location: [facility] OR     ORTHOPEDIC SURGERY      bilateral knee     PHACOEMULSIFICATION WITH STANDARD INTRAOCULAR LENS IMPLANT Right 2017    Procedure: PHACOEMULSIFICATION WITH STANDARD INTRAOCULAR LENS IMPLANT;  Right Cataract Removal with Implant;  Surgeon: Clif Michel MD;  Location: [facility] OR     SURGICAL HISTORY OF -       breast reduction mammoplasty      SURGICAL HISTORY OF -       bilateral lower extremity vein stripping     SURGICAL HISTORY OF -   2010    left total knee arthroplasty       Social History     Tobacco Use     Smoking status: Former Smoker     Years: 20.00     Types: Cigarettes     Quit date: 1982     Years since quittin.1     Smokeless tobacco: Never Used   Substance Use Topics     Alcohol use: Yes     Comment: occ wine     Family History   Problem Relation Age of Onset     Diabetes Father         last both legs to the disease     Heart Disease Father           age 91         Current Outpatient Medications   Medication Sig Dispense Refill     amLODIPine (NORVASC) 2.5 MG tablet Take 1 tablet (2.5 mg) by mouth daily 90 tablet 4     Apixaban Starter Pack (ELIQUIS DVT/PE STARTER PACK) 5 MG TBPK Take 5 mg by mouth 2 times daily 180 each 0     atorvastatin (LIPITOR) 40 MG tablet Take 1 tablet (40 mg) by mouth daily 90 tablet 4     Cholecalciferol (VITAMIN D3) 25 MCG (1000 UT) CAPS Take 1 capsule by mouth daily       FIBER ADULT GUMMIES PO Take 2 chew tab by mouth daily       Multiple Vitamins-Minerals (HAIR SKIN AND NAILS FORMULA PO) Take 2 chew tab by mouth       Multiple Vitamins-Minerals (MACULAR VITAMIN BENEFIT PO) Take 1 tablet by mouth daily Focus MaculaPro       valACYclovir (VALTREX) 1000 mg tablet 2 tabs at onset of cold sores. Repeat dose after 12 hours. 12 tablet 4     Recent Labs   Lab Test 22  0449 22  1024 21  1602 21  1602 10/21/20  1144 19  1219 17  0854 08/06/15  1706   LDL  --   --   --   --  111* 149* 156*  --    HDL  --   --   --   --  61 54 54  --    TRIG  --   --   --   --  155* 111 161*  --    ALT  --   --   --  31  --   --   --   --    CR 0.78 0.86   < > 0.87 0.91  --   --  0.80   GFRESTIMATED 77 68   < > 64 60*  --   --  70   GFRESTBLACK  --   --   --   --  70  --   --  85   POTASSIUM 3.9 4.1   < > 3.7 4.2  --   --  4.1    < > = values in this interval not displayed.            Pertinent mammograms are reviewed under the imaging tab.    Review of Systems   Constitutional: Negative for chills and fever.   HENT: Positive for hearing loss. Negative for congestion, ear pain and sore throat.    Eyes: Negative for pain and visual disturbance.   Respiratory: Negative for cough and shortness of breath.    Cardiovascular: Negative for palpitations and peripheral edema.   Gastrointestinal: Positive for heartburn. Negative for abdominal pain, constipation, diarrhea and nausea.   Breasts:  Negative for tenderness,  "breast mass and discharge.   Genitourinary: Negative for dysuria, frequency, hematuria, pelvic pain, urgency, vaginal bleeding and vaginal discharge.   Musculoskeletal: Negative for arthralgias, joint swelling and myalgias.   Skin: Negative for rash.   Neurological: Negative for dizziness, weakness, headaches and paresthesias.   Psychiatric/Behavioral: Negative for mood changes. The patient is not nervous/anxious.          OBJECTIVE:   /80 (BP Location: Right arm, Patient Position: Sitting, Cuff Size: Adult Regular)   Pulse 69   Temp 97.5  F (36.4  C) (Tympanic)   Resp 16   Ht 1.607 m (5' 3.25\")   Wt 78 kg (172 lb)   SpO2 98%   BMI 30.23 kg/m   Estimated body mass index is 30.23 kg/m  as calculated from the following:    Height as of this encounter: 1.607 m (5' 3.25\").    Weight as of this encounter: 78 kg (172 lb).  Physical Exam  GENERAL APPEARANCE: healthy, alert and no distress  EYES: Eyes grossly normal to inspection, PERRL and conjunctivae and sclerae normal  HENT: ear canals and TM's normal  NECK: no adenopathy, no asymmetry, masses, or scars and thyroid normal to palpation  RESP: lungs clear to auscultation - no rales, rhonchi or wheezes  BREAST: normal without masses, tenderness or nipple discharge and no palpable axillary masses or adenopathy  CV: regular rate and rhythm, normal S1 S2, no S3 or S4, no murmur, click or rub, no peripheral edema and peripheral pulses strong  ABDOMEN: soft, nontender, no hepatosplenomegaly, no masses and bowel sounds normal  MS: no musculoskeletal defects are noted and gait is age appropriate without ataxia  SKIN: no suspicious lesions or rashes  NEURO: Normal strength and tone, sensory exam grossly normal, mentation intact and speech normal  PSYCH: mentation appears normal and affect normal/bright    Diagnostic Test Results:  Labs reviewed in Epic    ASSESSMENT / PLAN:   Encounter for Medicare annual wellness exam    Acute pulmonary embolism without acute cor " "pulmonale, unspecified pulmonary embolism type (H)  Currently on Eliquis.  Complete a 3-month course.  Likely provoked secondary to preceding Covid infection.  But this is her second PE.  Previous one also felt to be provoked.  He is scheduled for hematology consult which I think is reasonable to discuss plan going forward whether she should be on chronic anticoagulation or not.    Essential hypertension  Well controlled. Refilled medication.     - amLODIPine (NORVASC) 2.5 MG tablet; Take 1 tablet (2.5 mg) by mouth daily    Hyperlipidemia LDL goal <130  Well controlled. Refilled medication.     - atorvastatin (LIPITOR) 40 MG tablet; Take 1 tablet (40 mg) by mouth daily    HSV (herpes simplex virus) infection  Stable. Refilled medication.    - valACYclovir (VALTREX) 1000 mg tablet; 2 tabs at onset of cold sores. Repeat dose after 12 hours.    Need for hepatitis C screening test  - Hepatitis C Screen Reflex to HCV RNA Quant and Genotype; Future    Lipid screening  - Lipid panel reflex to direct LDL Fasting; Future    High priority for 2019-nCoV vaccine  - COVID-19,PF,PFIZER (12+ Yrs GRAY LABEL)             COUNSELING:  Reviewed preventive health counseling, as reflected in patient instructions    Estimated body mass index is 30.23 kg/m  as calculated from the following:    Height as of this encounter: 1.607 m (5' 3.25\").    Weight as of this encounter: 78 kg (172 lb).        She reports that she quit smoking about 40 years ago. Her smoking use included cigarettes. She quit after 20.00 years of use. She has never used smokeless tobacco.      Appropriate preventive services were discussed with this patient, including applicable screening as appropriate for cardiovascular disease, diabetes, osteopenia/osteoporosis, and glaucoma.  As appropriate for age/gender, discussed screening for colorectal cancer, prostate cancer, breast cancer, and cervical cancer. Checklist reviewing preventive services available has been given to " the patient.    Reviewed patients plan of care and provided an AVS. The Intermediate Care Plan ( asthma action plan, low back pain action plan, and migraine action plan) for Marlene meets the Care Plan requirement. This Care Plan has been established and reviewed with the Patient.    Counseling Resources:  ATP IV Guidelines  Pooled Cohorts Equation Calculator  Breast Cancer Risk Calculator  Breast Cancer: Medication to Reduce Risk  FRAX Risk Assessment  ICSI Preventive Guidelines  Dietary Guidelines for Americans, 2010  USDA's MyPlate  ASA Prophylaxis  Lung CA Screening    Katy Rodriguez MD  [facility]    Identified Health Risks:

## 2022-04-06 NOTE — PATIENT INSTRUCTIONS
Stop eliquis 1 week after you return form your trip.    Can try Voltaren gel topically.      Patient Education   Personalized Prevention Plan  You are due for the preventive services outlined below.  Your care team is available to assist you in scheduling these services.  If you have already completed any of these items, please share that information with   your care team to update in your medical record.  Health Maintenance Due   Topic Date Due     Hepatitis C Screening  Never done     Zoster (Shingles) Vaccine (1 of 2) Never done     COVID-19 Vaccine (3 - Booster for Pfizer series) 08/22/2021     Flu Vaccine (1) 09/01/2021

## 2022-04-13 PROBLEM — B00.9 HSV (HERPES SIMPLEX VIRUS) INFECTION: Status: ACTIVE | Noted: 2022-04-13
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
## 2022-06-16 ENCOUNTER — OFFICE VISIT (OUTPATIENT)
Dept: HEMATOLOGY | Facility: CLINIC | Age: 82
End: 2022-06-16
Attending: PHYSICIAN ASSISTANT
Payer: COMMERCIAL

## 2022-06-16 VITALS
HEART RATE: 70 BPM | SYSTOLIC BLOOD PRESSURE: 196 MMHG | BODY MASS INDEX: 30.58 KG/M2 | RESPIRATION RATE: 16 BRPM | TEMPERATURE: 98 F | DIASTOLIC BLOOD PRESSURE: 85 MMHG | HEIGHT: 63 IN | OXYGEN SATURATION: 97 % | WEIGHT: 172.6 LBS

## 2022-06-16 DIAGNOSIS — M79.601 PAIN OF RIGHT UPPER EXTREMITY: ICD-10-CM

## 2022-06-16 DIAGNOSIS — I26.99 ACUTE PULMONARY EMBOLISM WITHOUT ACUTE COR PULMONALE, UNSPECIFIED PULMONARY EMBOLISM TYPE (H): Primary | ICD-10-CM

## 2022-06-16 PROCEDURE — G0463 HOSPITAL OUTPT CLINIC VISIT: HCPCS

## 2022-06-16 PROCEDURE — 99204 OFFICE O/P NEW MOD 45 MIN: CPT | Performed by: PHYSICIAN ASSISTANT

## 2022-06-16 ASSESSMENT — PAIN SCALES - GENERAL: PAINLEVEL: NO PAIN (0)

## 2022-06-16 NOTE — PATIENT INSTRUCTIONS
[facility]  [facility]  [address], [place] [address]  Main: [phone], Fax: [phone]    Marlene,  It was a pleasure seeing you today.  Thank you for allowing us to be involved in your care.  Please let us know if there is anything else we can do for you, so that we can be sure you are leaving completely satisfied with your care experience.    Please start taking Eliquis during high risk time periods including any travel longer than 2 hours, after surgical procedures where you have decreased mobility, if you are hospitalized for more than 48 hours or if you have any fractures or major trauma.   Please call us with any bleeding issues that you may have.  We have scheduled you for an ultrasound of your right upper extremity at [facility] [address] [place] [address]. This is scheduled for next Monday at 2:00pm . If this date and time does not work for you please call [phone] to reschedule or call Cata HAYES at [phone].    Patient Education & Resources:  For additional information, please see the following web links:  www.stoptheclot.org, www.clotconnect.org.    Call the [facility]  at [phone].     -If surgeries or procedures are planned (for holding instructions).     -If off anticoagulation, please call during high risk times (long-distance travel, broken bones or trauma, immobilization, surgery, pregnancy, or taking estrogen).     -Any new symptoms of DVT (deep vein thrombosis) or PE (pulmonary embolism)    -pain     -swelling     -redness    -warmth    -shortness of breath    -chest pain    -coughing up blood    We would like a provider on our team to see you at least annually for optimal care and to allow us to continue to prescribe for you.    Maddi Brown, MPH, PA-C  [facility]

## 2022-06-20 ENCOUNTER — ANCILLARY PROCEDURE (OUTPATIENT)
Dept: ULTRASOUND IMAGING | Facility: CLINIC | Age: 82
End: 2022-06-20
Attending: PHYSICIAN ASSISTANT
Payer: COMMERCIAL

## 2022-06-20 DIAGNOSIS — M79.601 PAIN OF RIGHT UPPER EXTREMITY: ICD-10-CM

## 2022-06-20 DIAGNOSIS — I26.99 ACUTE PULMONARY EMBOLISM WITHOUT ACUTE COR PULMONALE, UNSPECIFIED PULMONARY EMBOLISM TYPE (H): ICD-10-CM

## 2022-06-20 PROCEDURE — 93971 EXTREMITY STUDY: CPT | Mod: TC | Performed by: RADIOLOGY

## 2022-07-06 ENCOUNTER — TELEPHONE (OUTPATIENT)
Dept: DERMATOLOGY | Facility: CLINIC | Age: 82
End: 2022-07-06
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
## 2022-07-11 NOTE — TELEPHONE ENCOUNTER
"Spoke to patient who stated \"it was nothing urgent-he just told me to come in every 3 months to have my moles checked..\"     Patient verbalized understanding and she wants to be added to the wait list. Maddi Rodriguez RN    "

## 2022-07-22 ENCOUNTER — OFFICE VISIT (OUTPATIENT)
Dept: ORTHOPEDICS | Facility: CLINIC | Age: 82
End: 2022-07-22

## 2022-07-22 ENCOUNTER — ANCILLARY PROCEDURE (OUTPATIENT)
Dept: GENERAL RADIOLOGY | Facility: CLINIC | Age: 82
End: 2022-07-22
Attending: PEDIATRICS
Payer: COMMERCIAL

## 2022-07-22 VITALS
BODY MASS INDEX: 30.23 KG/M2 | SYSTOLIC BLOOD PRESSURE: 142 MMHG | HEART RATE: 85 BPM | WEIGHT: 172 LBS | DIASTOLIC BLOOD PRESSURE: 80 MMHG

## 2022-07-22 DIAGNOSIS — M79.621 PAIN IN RIGHT UPPER ARM: ICD-10-CM

## 2022-07-22 DIAGNOSIS — M25.511 ACUTE PAIN OF RIGHT SHOULDER: ICD-10-CM

## 2022-07-22 DIAGNOSIS — M79.621 PAIN IN RIGHT UPPER ARM: Primary | ICD-10-CM

## 2022-07-22 PROCEDURE — 73080 X-RAY EXAM OF ELBOW: CPT | Mod: TC | Performed by: RADIOLOGY

## 2022-07-22 PROCEDURE — 99204 OFFICE O/P NEW MOD 45 MIN: CPT | Performed by: PEDIATRICS
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
## 2022-07-22 NOTE — PATIENT INSTRUCTIONS
We discussed these other possible diagnosis: Likely rotator cuff tear given exam, concern for chronic    Plan:  - Today's Plan of Care:  MRI of the Right Shoulder - Call [phone] to schedule MRI   Discussed activity considerations and other supportive care including Ice/Heat, OTC and other topical medications as needed.  Voltaren    -We also discussed other future treatment options:  Referral to Physical Therapy  Consideration of Injections  Referral to Orthopedic Surgery    Follow Up: In clinic with Dr. Lowe after MRI (wait at least 1-2 days)     If you have any further questions for your physician or physician s care team you can call [phone] and use option 3 to leave a voice message.
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
## 2022-07-24 ENCOUNTER — HOSPITAL ENCOUNTER (OUTPATIENT)
Dept: MRI IMAGING | Facility: CLINIC | Age: 82
Discharge: HOME | End: 2022-07-24
Attending: PEDIATRICS | Admitting: PEDIATRICS
Payer: COMMERCIAL

## 2022-07-24 DIAGNOSIS — M79.621 PAIN IN RIGHT UPPER ARM: ICD-10-CM

## 2022-07-24 PROCEDURE — 73221 MRI JOINT UPR EXTREM W/O DYE: CPT | Mod: RT

## 2022-07-24 PROCEDURE — 73221 MRI JOINT UPR EXTREM W/O DYE: CPT | Mod: 26 | Performed by: RADIOLOGY

## 2022-08-12 ENCOUNTER — OFFICE VISIT (OUTPATIENT)
Dept: ORTHOPEDICS | Facility: CLINIC | Age: 82
End: 2022-08-12
Payer: COMMERCIAL

## 2022-08-12 VITALS
HEART RATE: 76 BPM | SYSTOLIC BLOOD PRESSURE: 164 MMHG | WEIGHT: 172 LBS | DIASTOLIC BLOOD PRESSURE: 91 MMHG | BODY MASS INDEX: 30.23 KG/M2

## 2022-08-12 DIAGNOSIS — M25.511 CHRONIC RIGHT SHOULDER PAIN: ICD-10-CM

## 2022-08-12 DIAGNOSIS — G89.29 CHRONIC RIGHT SHOULDER PAIN: ICD-10-CM

## 2022-08-12 DIAGNOSIS — M75.121 NONTRAUMATIC COMPLETE TEAR OF RIGHT ROTATOR CUFF: Primary | ICD-10-CM

## 2022-08-12 PROBLEM — M79.621 PAIN IN RIGHT UPPER ARM: Status: ACTIVE | Noted: 2022-08-12

## 2022-08-12 PROCEDURE — 99213 OFFICE O/P EST LOW 20 MIN: CPT | Performed by: PEDIATRICS
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
## 2022-08-12 NOTE — PATIENT INSTRUCTIONS
We discussed these other possible diagnosis: Likely more chronic rotator cuff tear.  We discussed the following treatment options: symptom treatment, activity modification/rest, injection rehab and referral. Following discussion, plan: patient would like to discuss options with orthopedic surgery, discussed surgical options repair. V. replacement    Plan:  - Today's Plan of Care:  Referral to an Orthopedic Surgeon  Rehab: Physical Therapy: [facility] - [phone]  Discussed activity considerations and other supportive care including Ice/Heat, OTC and other topical medications as needed.    -We also discussed other future treatment options:  Consideration of injections    Follow Up: as needed    If you have any further questions for your physician or physician s care team you can call [phone] and use option 3 to leave a voice message.
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
## 2022-08-31 ENCOUNTER — ANCILLARY PROCEDURE (OUTPATIENT)
Dept: MAMMOGRAPHY | Facility: CLINIC | Age: 82
End: 2022-08-31
Payer: COMMERCIAL

## 2022-08-31 DIAGNOSIS — Z12.31 VISIT FOR SCREENING MAMMOGRAM: ICD-10-CM

## 2022-08-31 PROCEDURE — 77063 BREAST TOMOSYNTHESIS BI: CPT | Mod: TC | Performed by: RADIOLOGY

## 2022-08-31 PROCEDURE — 77067 SCR MAMMO BI INCL CAD: CPT | Mod: TC | Performed by: RADIOLOGY

## 2022-09-30 ENCOUNTER — OFFICE VISIT (OUTPATIENT)
Dept: FAMILY MEDICINE | Facility: CLINIC | Age: 82
End: 2022-09-30
Payer: COMMERCIAL

## 2022-09-30 ENCOUNTER — TELEPHONE (OUTPATIENT)
Dept: FAMILY MEDICINE | Facility: CLINIC | Age: 82
End: 2022-09-30

## 2022-09-30 ENCOUNTER — NURSE TRIAGE (OUTPATIENT)
Dept: FAMILY MEDICINE | Facility: CLINIC | Age: 82
End: 2022-09-30

## 2022-09-30 VITALS
SYSTOLIC BLOOD PRESSURE: 138 MMHG | RESPIRATION RATE: 16 BRPM | HEART RATE: 86 BPM | WEIGHT: 170 LBS | DIASTOLIC BLOOD PRESSURE: 88 MMHG | TEMPERATURE: 97.5 F | BODY MASS INDEX: 30.12 KG/M2 | OXYGEN SATURATION: 95 % | HEIGHT: 63 IN

## 2022-09-30 DIAGNOSIS — S46.011D TRAUMATIC TEAR OF RIGHT ROTATOR CUFF, UNSPECIFIED TEAR EXTENT, SUBSEQUENT ENCOUNTER: Primary | ICD-10-CM

## 2022-09-30 DIAGNOSIS — R13.19 ESOPHAGEAL DYSPHAGIA: ICD-10-CM

## 2022-09-30 DIAGNOSIS — Z12.11 COLON CANCER SCREENING: ICD-10-CM

## 2022-09-30 PROCEDURE — 99213 OFFICE O/P EST LOW 20 MIN: CPT | Performed by: FAMILY MEDICINE

## 2022-09-30 ASSESSMENT — PAIN SCALES - GENERAL: PAINLEVEL: SEVERE PAIN (6)

## 2022-09-30 NOTE — TELEPHONE ENCOUNTER
"Contacted patient for triage. States she has intermittent vomiting. When she swallows the food \"won't go down\" and this makes her vomit.   Denies dizziness, dehydration. No other symptoms. No covid test taken. Denies SOB, tongue swelling.   Appointment today. Patient forgot about appointment but will make it to clinic.   Sunitha Fuentes RN on 9/30/2022 at 10:45 AM      Reason for Disposition    [1] MILD or MODERATE vomiting AND [2] present > 48 hours (2 days) (Exception: mild vomiting with associated diarrhea)    Additional Information    Negative: [1] Vomiting AND [2] abdomen looks much more swollen than usual    Negative: [1] Vomiting AND [2] contains bile (green color)    Negative: [1] Constant abdominal pain AND [2] present > 2 hours    Negative: [1] Fever > 103 F (39.4 C) AND [2] not able to get the fever down using Fever Care Advice    Negative: [1] Fever > 101 F (38.3 C) AND [2] age > 60 years    Negative: [1] Fever > 100.0 F (37.8 C) AND [2] bedridden (e.g., nursing home patient, CVA, chronic illness, recovering from surgery)    Negative: [1] Fever > 100.0 F (37.8 C) AND [2] weak immune system (e.g., HIV positive, cancer chemo, splenectomy, organ transplant, chronic steroids)    Negative: Taking any of the following medications: digoxin (Lanoxin), lithium, theophylline, phenytoin (Dilantin)    Negative: [1] SEVERE vomiting (e.g., 6 or more times/day) AND [2] present > 8 hours (Exception: patient sounds well, is drinking liquids, does not sound dehydrated, and vomiting has lasted less than 24 hours)    Negative: [1] MODERATE vomiting (e.g., 3 - 5 times/day) AND [2] age > 60 years    Negative: Severe headache (e.g., excruciating) (Exception: similar to previous migraines)    Negative: High-risk adult (e.g., diabetes mellitus, brain tumor, V-P shunt, hernia)    Negative: [1] Drinking very little AND [2] dehydration suspected (e.g., no urine > 12 hours, very dry mouth, very lightheaded)    Negative: Patient " "sounds very sick or weak to the triager    Negative: [1] Vomiting AND [2] contains red blood or black (\"coffee ground\") material  (Exception: few red streaks in vomit that only happened once)    Negative: Severe pain in one eye    Negative: Recent head injury (within last 3 days)    Negative: Recent abdominal injury (within last 3 days)    Negative: [1] Insulin-dependent diabetes (Type I) AND [2] glucose > 400 mg/dl (22 mmol/l)    Negative: [1] Vomiting AND [2] hernia is more painful or swollen than usual    Negative: Vomiting occurs only while coughing    Negative: [1] Pregnant < 20 Weeks AND [2] nausea/vomiting began in early pregnancy (i.e., 4-8 weeks pregnant)    Negative: Chest pain    Negative: Headache is main symptom    Negative: Vomiting (or Nausea) in a cancer patient who is currently (or recently) receiving chemotherapy or radiation therapy, or cancer patient who has metastatic or end-stage cancer and is receiving palliative care    Negative: Shock suspected (e.g., cold/pale/clammy skin, too weak to stand, low BP, rapid pulse)    Negative: Difficult to awaken or acting confused (e.g., disoriented, slurred speech)    Negative: Sounds like a life-threatening emergency to the triager    Protocols used: VOMITING-A-AH      "

## 2022-09-30 NOTE — TELEPHONE ENCOUNTER
Hi Dr Rodriguez-You placed an external upper endoscopy order for Janee today.  Do you recall where you are sending her so referral can be completed?   Thank you!  Tammy/[facility] Referrals     Will close encounter, there is an additional order for [facility]PR[facility]ANK[facility], will send to [facility].

## 2022-09-30 NOTE — PROGRESS NOTES
"  Assessment & Plan     Traumatic tear of right rotator cuff, unspecified tear extent, subsequent encounter  She would like to see the surgeon her son saw at [facility]. Discussed may not be covered by insurance. Referral order placed.  - Orthopedic  Referral; Future    Esophageal dysphagia  EGD to r/o stricture.   - Adult GI  Referral - Procedure Only; Future    Colon cancer screening  - Colonoscopy Screening  Referral; Future             BMI:   Estimated body mass index is 29.88 kg/m  as calculated from the following:    Height as of this encounter: 1.607 m (5' 3.25\").    Weight as of this encounter: 77.1 kg (170 lb).           No follow-ups on file.    Katy Rodriguez MD  [facility]    Pablo Weller is a 82 year old, presenting for the following health issues:  Gastrointestinal Problem and Referral (Patient would like a referral  outside [facility] for colonoscopy and she would like a referral to Ortho for her arm)      History of Present Illness       Reason for visit:  Upper GI and arm  Symptom intensity:  Moderate  Symptom progression:  Worsening  Had these symptoms before:  Yes  Has tried/received treatment for these symptoms:  Yes  Previous treatment was successful:  No  What makes it worse:  Depends on y diet  What makes it better:  Depends on my diet    She eats 2-3 servings of fruits and vegetables daily.She consumes 0 sweetened beverage(s) daily.She exercises with enough effort to increase her heart rate 20 to 29 minutes per day.  She exercises with enough effort to increase her heart rate 4 days per week. She is missing 1 dose(s) of medications per week.  She is not taking prescribed medications regularly due to remembering to take.       Patient would like a referral for colonoscopy outside of [facility]  Patient would like a referral to ortho for arm injury        Review of Systems   Constitutional, neuro, ENT, endocrine, pulmonary, " "cardiac, gastrointestinal, genitourinary, musculoskeletal, integument and psychiatric systems are negative, except as otherwise noted.       Objective    /88   Pulse 86   Temp 97.5  F (36.4  C) (Tympanic)   Resp 16   Ht 1.607 m (5' 3.25\")   Wt 77.1 kg (170 lb)   SpO2 95%   BMI 29.88 kg/m    Body mass index is 29.88 kg/m .  Physical Exam   GENERAL: Pleasant, well appearing female.                  "

## 2022-09-30 NOTE — NURSING NOTE
"Initial /88   Pulse 86   Temp 97.5  F (36.4  C) (Tympanic)   Resp 16   Ht 1.607 m (5' 3.25\")   Wt 77.1 kg (170 lb)   SpO2 95%   BMI 29.88 kg/m   Estimated body mass index is 29.88 kg/m  as calculated from the following:    Height as of this encounter: 1.607 m (5' 3.25\").    Weight as of this encounter: 77.1 kg (170 lb). .      "

## 2022-10-16 ENCOUNTER — HEALTH MAINTENANCE LETTER (OUTPATIENT)
Age: 82
End: 2022-10-16

## 2022-10-19 ENCOUNTER — TRANSFERRED RECORDS (OUTPATIENT)
Dept: HEALTH INFORMATION MANAGEMENT | Facility: CLINIC | Age: 82
End: 2022-10-19

## 2022-10-21 ENCOUNTER — OFFICE VISIT (OUTPATIENT)
Dept: FAMILY MEDICINE | Facility: CLINIC | Age: 82
End: 2022-10-21
Payer: COMMERCIAL

## 2022-10-21 VITALS
BODY MASS INDEX: 30.12 KG/M2 | WEIGHT: 170 LBS | SYSTOLIC BLOOD PRESSURE: 162 MMHG | HEART RATE: 68 BPM | DIASTOLIC BLOOD PRESSURE: 82 MMHG | TEMPERATURE: 98.1 F | OXYGEN SATURATION: 97 % | HEIGHT: 63 IN | RESPIRATION RATE: 16 BRPM

## 2022-10-21 DIAGNOSIS — Z13.29 SCREENING FOR ENDOCRINE, METABOLIC AND IMMUNITY DISORDER: ICD-10-CM

## 2022-10-21 DIAGNOSIS — Z13.228 SCREENING FOR ENDOCRINE, METABOLIC AND IMMUNITY DISORDER: ICD-10-CM

## 2022-10-21 DIAGNOSIS — H02.403 PTOSIS OF BOTH EYELIDS: ICD-10-CM

## 2022-10-21 DIAGNOSIS — I10 ESSENTIAL HYPERTENSION: ICD-10-CM

## 2022-10-21 DIAGNOSIS — Z13.0 SCREENING FOR ENDOCRINE, METABOLIC AND IMMUNITY DISORDER: ICD-10-CM

## 2022-10-21 DIAGNOSIS — I26.99 PULMONARY EMBOLISM, UNSPECIFIED CHRONICITY, UNSPECIFIED PULMONARY EMBOLISM TYPE, UNSPECIFIED WHETHER ACUTE COR PULMONALE PRESENT (H): ICD-10-CM

## 2022-10-21 DIAGNOSIS — Z01.818 PREOP EXAMINATION: Primary | ICD-10-CM

## 2022-10-21 DIAGNOSIS — Z13.220 LIPID SCREENING: ICD-10-CM

## 2022-10-21 LAB
ANION GAP SERPL CALCULATED.3IONS-SCNC: 13 MMOL/L (ref 7–15)
BASOPHILS # BLD AUTO: 0 10E3/UL (ref 0–0.2)
BASOPHILS NFR BLD AUTO: 0 %
BUN SERPL-MCNC: 19.5 MG/DL (ref 8–23)
CALCIUM SERPL-MCNC: 9.8 MG/DL (ref 8.8–10.2)
CHLORIDE SERPL-SCNC: 102 MMOL/L (ref 98–107)
CHOLEST SERPL-MCNC: 220 MG/DL
CREAT SERPL-MCNC: 0.85 MG/DL (ref 0.51–0.95)
DEPRECATED HCO3 PLAS-SCNC: 26 MMOL/L (ref 22–29)
EOSINOPHIL # BLD AUTO: 0 10E3/UL (ref 0–0.7)
EOSINOPHIL NFR BLD AUTO: 0 %
ERYTHROCYTE [DISTWIDTH] IN BLOOD BY AUTOMATED COUNT: 13.2 % (ref 10–15)
GFR SERPL CREATININE-BSD FRML MDRD: 69 ML/MIN/1.73M2
GLUCOSE SERPL-MCNC: 102 MG/DL (ref 70–99)
HCT VFR BLD AUTO: 38.6 % (ref 35–47)
HDLC SERPL-MCNC: 63 MG/DL
HGB BLD-MCNC: 12.3 G/DL (ref 11.7–15.7)
IMM GRANULOCYTES # BLD: 0 10E3/UL
IMM GRANULOCYTES NFR BLD: 0 %
LDLC SERPL CALC-MCNC: 130 MG/DL
LYMPHOCYTES # BLD AUTO: 2.2 10E3/UL (ref 0.8–5.3)
LYMPHOCYTES NFR BLD AUTO: 22 %
MCH RBC QN AUTO: 29.2 PG (ref 26.5–33)
MCHC RBC AUTO-ENTMCNC: 31.9 G/DL (ref 31.5–36.5)
MCV RBC AUTO: 92 FL (ref 78–100)
MONOCYTES # BLD AUTO: 0.6 10E3/UL (ref 0–1.3)
MONOCYTES NFR BLD AUTO: 7 %
NEUTROPHILS # BLD AUTO: 6.9 10E3/UL (ref 1.6–8.3)
NEUTROPHILS NFR BLD AUTO: 71 %
NONHDLC SERPL-MCNC: 157 MG/DL
PLATELET # BLD AUTO: 285 10E3/UL (ref 150–450)
POTASSIUM SERPL-SCNC: 4.6 MMOL/L (ref 3.4–5.3)
RBC # BLD AUTO: 4.21 10E6/UL (ref 3.8–5.2)
SODIUM SERPL-SCNC: 141 MMOL/L (ref 136–145)
TRIGL SERPL-MCNC: 135 MG/DL
TSH SERPL DL<=0.005 MIU/L-ACNC: 1.43 UIU/ML (ref 0.3–4.2)
WBC # BLD AUTO: 9.7 10E3/UL (ref 4–11)

## 2022-10-21 PROCEDURE — 84443 ASSAY THYROID STIM HORMONE: CPT | Performed by: FAMILY MEDICINE

## 2022-10-21 PROCEDURE — 80061 LIPID PANEL: CPT | Performed by: FAMILY MEDICINE

## 2022-10-21 PROCEDURE — 36415 COLL VENOUS BLD VENIPUNCTURE: CPT | Performed by: FAMILY MEDICINE

## 2022-10-21 PROCEDURE — 99214 OFFICE O/P EST MOD 30 MIN: CPT | Mod: 25 | Performed by: FAMILY MEDICINE

## 2022-10-21 PROCEDURE — 90662 IIV NO PRSV INCREASED AG IM: CPT | Performed by: FAMILY MEDICINE

## 2022-10-21 PROCEDURE — 85025 COMPLETE CBC W/AUTO DIFF WBC: CPT | Performed by: FAMILY MEDICINE

## 2022-10-21 PROCEDURE — G0008 ADMIN INFLUENZA VIRUS VAC: HCPCS | Performed by: FAMILY MEDICINE

## 2022-10-21 PROCEDURE — 80048 BASIC METABOLIC PNL TOTAL CA: CPT | Performed by: FAMILY MEDICINE

## 2022-10-21 RX ORDER — AMLODIPINE BESYLATE 2.5 MG/1
5 TABLET ORAL DAILY
Qty: 90 TABLET | Refills: 1 | Status: SHIPPED | OUTPATIENT
Start: 2022-10-21 | End: 2022-10-21

## 2022-10-21 RX ORDER — AMLODIPINE BESYLATE 5 MG/1
5 TABLET ORAL DAILY
Qty: 90 TABLET | Refills: 1 | Status: SHIPPED | OUTPATIENT
Start: 2022-10-21 | End: 2023-01-05

## 2022-10-21 ASSESSMENT — PAIN SCALES - GENERAL: PAINLEVEL: NO PAIN (0)

## 2022-10-21 NOTE — LETTER
November 10, 2022      Janee J Yana  [address] [facility] [address]        Dear ,    We are writing to inform you of your test results.    Results were released with recommendations via My Chart. My Chart alerted me that you have not yet viewed these so I am mailing them as well.     Cholesterol mildly elevated.  Not abnormal enough at this point to warrant medication changes but I would recommend: increasing daily exercise, increasing dietary fiber, eliminating trans fats and reducing saturated fats. Blood sugar is above optimum but not in diabetic range.  Limiting dietary sugar and carbohydrates and getting regular exercise can help improve this.  Monitor yearly. Otherwise labs look good.    Resulted Orders   Lipid panel reflex to direct LDL Fasting   Result Value Ref Range    Cholesterol 220 (H) <200 mg/dL    Triglycerides 135 <150 mg/dL    Direct Measure HDL 63 >=50 mg/dL    LDL Cholesterol Calculated 130 (H) <=100 mg/dL    Non HDL Cholesterol 157 (H) <130 mg/dL    Narrative    Cholesterol  Desirable:  <200 mg/dL    Triglycerides  Normal:  Less than 150 mg/dL  Borderline High:  150-199 mg/dL  High:  200-499 mg/dL  Very High:  Greater than or equal to 500 mg/dL    Direct Measure HDL  Female:  Greater than or equal to 50 mg/dL   Male:  Greater than or equal to 40 mg/dL    LDL Cholesterol  Desirable:  <100mg/dL  Above Desirable:  100-129 mg/dL   Borderline High:  130-159 mg/dL   High:  160-189 mg/dL   Very High:  >= 190 mg/dL    Non HDL Cholesterol  Desirable:  130 mg/dL  Above Desirable:  130-159 mg/dL  Borderline High:  160-189 mg/dL  High:  190-219 mg/dL  Very High:  Greater than or equal to 220 mg/dL   Basic metabolic panel   Result Value Ref Range    Sodium 141 136 - 145 mmol/L    Potassium 4.6 3.4 - 5.3 mmol/L    Chloride 102 98 - 107 mmol/L    Carbon Dioxide (CO2) 26 22 - 29 mmol/L    Anion Gap 13 7 - 15 mmol/L    Urea Nitrogen 19.5 8.0 - 23.0 mg/dL    Creatinine 0.85 0.51 - 0.95 mg/dL     Calcium 9.8 8.8 - 10.2 mg/dL    Glucose 102 (H) 70 - 99 mg/dL    GFR Estimate 69 >60 mL/min/1.73m2      Comment:      Effective December 21, 2021 eGFRcr in adults is calculated using the 2021 CKD-EPI creatinine equation which includes age and gender (Kellie et al., NEJM, DOI: 10.1056/PKNLkm0776578)   TSH with free T4 reflex   Result Value Ref Range    TSH 1.43 0.30 - 4.20 uIU/mL       If you have any questions or concerns, please call the clinic at the number listed above.       Sincerely,      Katy Rodriguez MD

## 2022-10-21 NOTE — NURSING NOTE
"Initial BP (!) 162/82   Pulse 68   Temp 98.1  F (36.7  C) (Tympanic)   Resp 16   Ht 1.607 m (5' 3.25\")   Wt 77.1 kg (170 lb)   SpO2 97%   BMI 29.88 kg/m   Estimated body mass index is 29.88 kg/m  as calculated from the following:    Height as of this encounter: 1.607 m (5' 3.25\").    Weight as of this encounter: 77.1 kg (170 lb). .      "

## 2022-10-21 NOTE — PROGRESS NOTES
[facility]  [address]  [facility] [address]  Phone: [phone]  Primary Provider: Katy Padilla  Pre-op Performing Provider: KATY PADILLA      PREOPERATIVE EVALUATION:  Today's date: 10/21/2022    Marlene Millan is a 82 year old female who presents for a preoperative evaluation.    Surgical Information:  Surgery/Procedure: BLEPHOPLASTY  Surgery Location: [facility]   Surgeon: DR. GLOVER  Surgery Date: 10/27/2022  Time of Surgery: TBD  Where patient plans to recover: At home with family  Fax number for surgical facility:     Type of Anesthesia Anticipated: General    Assessment & Plan     The proposed surgical procedure is considered INTERMEDIATE risk.    Preop examination  - Basic metabolic panel; Future  - CBC with Platelets & Differential; Future    Ptosis of both eyelids    Essential hypertension  Uncontrolled.  Increase Norvasc to 5 mg daily.  Follow-up after surgery to decide if this needs to be a long-term change.  - amLODIPine (NORVASC) 5 MG tablet; Take 1 tablet (5 mg) by mouth daily    Pulmonary embolism, unspecified chronicity, unspecified pulmonary embolism type, unspecified whether acute cor pulmonale present (H)  History of pulmonary embolism.  Hematology had recommended she take Eliquis if she is ever going on any long trips or has immobilization.  I think postoperatively we should consider her hypercoagulable.  Recommend 4-6 weeks of Eliquis after surgery.    Lipid screening  - Lipid panel reflex to direct LDL Fasting; Future    Screening for endocrine, metabolic and immunity disorder  - TSH with free T4 reflex; Future           Risks and Recommendations:  The patient has the following additional risks and recommendations for perioperative complications:   - No identified additional risk factors other than previously addressed    Medication Instructions:  Take all scheduled medications except Eliquis up until surgery.  After  surgery restart Eliquis for 4-6 weeks.    RECOMMENDATION:  APPROVAL GIVEN to proceed with proposed procedure, without further diagnostic evaluation.                      Subjective     HPI related to upcoming procedure: Bilateral lid ptosis affecting visual field    Preop Questions 10/21/2022   1. Have you ever had a heart attack or stroke? No   2. Have you ever had surgery on your heart or blood vessels, such as a stent placement, a coronary artery bypass, or surgery on an artery in your head, neck, heart, or legs? No   3. Do you have chest pain with activity? No   4. Do you have a history of  heart failure? No   5. Do you currently have a cold, bronchitis or symptoms of other infection? No   6. Do you have a cough, shortness of breath, or wheezing? No   7. Do you or anyone in your family have previous history of blood clots? YES -personal history of DVT/PE   8. Do you or does anyone in your family have a serious bleeding problem such as prolonged bleeding following surgeries or cuts? No   9. Have you ever had problems with anemia or been told to take iron pills? YES -    10. Have you had any abnormal blood loss such as black, tarry or bloody stools, or abnormal vaginal bleeding? No   11. Have you ever had a blood transfusion? No   12. Are you willing to have a blood transfusion if it is medically needed before, during, or after your surgery? Yes   13. Have you or any of your relatives ever had problems with anesthesia? YES -    14. Do you have sleep apnea, excessive snoring or daytime drowsiness? No   15. Do you have any artifical heart valves or other implanted medical devices like a pacemaker, defibrillator, or continuous glucose monitor? No   16. Do you have artificial joints? YES -    17. Are you allergic to latex? No       Health Care Directive:  Patient has a Health Care Directive on file      Preoperative Review of :   reviewed - no record of controlled substances prescribed.      Status of Chronic  Conditions:  See problem list for active medical problems.  Problems all longstanding and stable, except as noted/documented.  See ROS for pertinent symptoms related to these conditions.      Review of Systems  Constitutional, neuro, ENT, endocrine, pulmonary, cardiac, gastrointestinal, genitourinary, musculoskeletal, integument and psychiatric systems are negative, except as otherwise noted.    Patient Active Problem List    Diagnosis Date Noted     Traumatic tear of right rotator cuff, unspecified tear extent, subsequent encounter 09/30/2022     Priority: Medium     Pain in right upper arm 08/12/2022     Priority: Medium     Acute pain of right shoulder 08/12/2022     Priority: Medium     HSV (herpes simplex virus) infection 04/13/2022     Priority: Medium     Acute pulmonary embolism without acute cor pulmonale, unspecified pulmonary embolism type (H) 02/20/2022     Priority: Medium     Ct 2/20/2022- The study is positive for multiple bilateral pulmonary emboli. No evidence of right heart strain.       Senile nuclear cataract 12/19/2017     Priority: Medium     ASD (atrial septal defect) 12/14/2017     Priority: Medium     [facility] 12/12/2012     Priority: Medium     Jazmin Morgan RN-PHN  FPA / [facility]   [phone]    DX V65.8 REPLACED WITH [id number] [facility] (04/08/2013)       Advanced directives, counseling/discussion 06/11/2012     Priority: Medium     Received 2007         Patent foramen ovale 02/27/2012     Priority: Medium     Incidental finding on transesophageal echocardiogram       GERD (gastroesophageal reflux disease) 02/25/2011     Priority: Medium     Controlled by diet       HYPERLIPIDEMIA LDL GOAL <130 10/31/2010     Priority: Medium     Essential hypertension 06/07/2010     Priority: Medium     Macular degeneration 07/14/2008     Priority: Medium     Left eye       Spinal stenosis, lumbar region, without neurogenic claudication 07/14/2008     Priority:  Medium      Past Medical History:   Diagnosis Date     Basal cell carcinoma      Hypertension 6/7/2010     Macular degeneration      PONV (postoperative nausea and vomiting)      Pulmonary embolism (H)      Pulmonary embolism, bilateral (H) 2/19/2012    Post-surgical at [facility] following total knee replacement      Shingles outbreak December 2016     Past Surgical History:   Procedure Laterality Date     basal cell cancer of nose  Oct 2012     BREAST SURGERY      breast reduction     CARPAL TUNNEL RELEASE RT/LT      right     COMBINED REPAIR PTOSIS WITH BLEPHAROPLASTY BILATERAL Bilateral 6/30/2015    Procedure: COMBINED REPAIR PTOSIS WITH BLEPHAROPLASTY BILATERAL;  Surgeon: Ilir Marvin MD;  Location: [facility]     HC REMOVAL GALLBLADDER       LIGATE VEIN VARICOSE, PHLEBECTOMY MULTIPLE STAB, COMBINED Bilateral 8/13/2015    Procedure: COMBINED LIGATE VEIN VARICOSE, PHLEBECTOMY MULTIPLE STAB;  Surgeon: Alphonse Pro MD;  Location: [facility] OR     ORTHOPEDIC SURGERY      bilateral knee     PHACOEMULSIFICATION WITH STANDARD INTRAOCULAR LENS IMPLANT Right 12/21/2017    Procedure: PHACOEMULSIFICATION WITH STANDARD INTRAOCULAR LENS IMPLANT;  Right Cataract Removal with Implant;  Surgeon: Clif Michel MD;  Location: [facility] OR     SURGICAL HISTORY OF -       breast reduction mammoplasty 1990s     SURGICAL HISTORY OF -       bilateral lower extremity vein stripping     SURGICAL HISTORY OF -   2/22/2010    left total knee arthroplasty     Current Outpatient Medications   Medication Sig Dispense Refill     amLODIPine (NORVASC) 5 MG tablet Take 1 tablet (5 mg) by mouth daily 90 tablet 1     apixaban ANTICOAGULANT (ELIQUIS ANTICOAGULANT) 2.5 MG tablet Take 1 tablet (2.5 mg) by mouth 2 times daily 60 tablet 0     atorvastatin (LIPITOR) 40 MG tablet Take 1 tablet (40 mg) by mouth daily 90 tablet 4     Cholecalciferol (VITAMIN D3) 25 MCG (1000 UT) CAPS Take 1 capsule by mouth daily       FIBER ADULT GUMMIES PO  "Take 2 chew tab by mouth daily       Multiple Vitamins-Minerals (HAIR SKIN AND NAILS FORMULA PO) Take 2 chew tab by mouth       Multiple Vitamins-Minerals (MACULAR VITAMIN BENEFIT PO) Take 1 tablet by mouth daily Focus MaculaPro       valACYclovir (VALTREX) 1000 mg tablet 2 tabs at onset of cold sores. Repeat dose after 12 hours. 12 tablet 4       Allergies   Allergen Reactions     Sulfa Drugs Visual Disturbance     Affected eyesight        Social History     Tobacco Use     Smoking status: Former     Years: 20.00     Types: Cigarettes     Quit date: 1982     Years since quittin.7     Smokeless tobacco: Never   Substance Use Topics     Alcohol use: Yes     Comment: occ wine     Family History   Problem Relation Age of Onset     Diabetes Father         last both legs to the disease     Heart Disease Father          age 91     History   Drug Use No         Objective     BP (!) 162/82   Pulse 68   Temp 98.1  F (36.7  C) (Tympanic)   Resp 16   Ht 1.607 m (5' 3.25\")   Wt 77.1 kg (170 lb)   SpO2 97%   BMI 29.88 kg/m      Physical Exam    GENERAL APPEARANCE: healthy, alert and no distress     EYES: EOMI, PERRL     HENT: ear canals and TM's normal     NECK: no adenopathy, no asymmetry, masses, or scars and thyroid normal to palpation     RESP: lungs clear to auscultation - no rales, rhonchi or wheezes     CV: regular rates and rhythm, normal S1 S2, no S3 or S4 and no murmur, click or rub     ABDOMEN:  soft, nontender, no HSM or masses and bowel sounds normal     MS: extremities normal- no gross deformities noted, no evidence of inflammation in joints, FROM in all extremities.     SKIN: no suspicious lesions or rashes     NEURO: Normal strength and tone, sensory exam grossly normal, mentation intact and speech normal     PSYCH: mentation appears normal. and affect normal/bright     LYMPHATICS: No cervical adenopathy    Recent Labs   Lab Test 22  0449 22  1254 22  1024   HGB 12.4  --  " 12.7     --  293   INR 1.29* 1.40* 0.94     --  139   POTASSIUM 3.9  --  4.1   CR 0.78  --  0.86        Diagnostics:  Labs pending at this time.  Results will be reviewed when available.   No EKG required for low risk surgery (cataract, skin procedure, breast biopsy, etc).    Revised Cardiac Risk Index (RCRI):  The patient has the following serious cardiovascular risks for perioperative complications:   - No serious cardiac risks = 0 points     RCRI Interpretation: 0 points: Class I (very low risk - 0.4% complication rate)           Signed Electronically by: Katy Rodriguez MD  Copy of this evaluation report is provided to requesting physician.

## 2022-11-01 ENCOUNTER — OFFICE VISIT (OUTPATIENT)
Dept: DERMATOLOGY | Facility: CLINIC | Age: 82
End: 2022-11-01
Payer: COMMERCIAL

## 2022-11-01 DIAGNOSIS — L81.4 LENTIGO: ICD-10-CM

## 2022-11-01 DIAGNOSIS — Z85.828 HISTORY OF SKIN CANCER: Primary | ICD-10-CM

## 2022-11-01 DIAGNOSIS — L82.1 SEBORRHEIC KERATOSIS: ICD-10-CM

## 2022-11-01 DIAGNOSIS — D18.01 ANGIOMA OF SKIN: ICD-10-CM

## 2022-11-01 DIAGNOSIS — L82.0 INFLAMED SEBORRHEIC KERATOSIS: ICD-10-CM

## 2022-11-01 DIAGNOSIS — D23.9 DERMAL NEVUS: ICD-10-CM

## 2022-11-01 PROCEDURE — 17111 DESTRUCTION B9 LESIONS 15/>: CPT | Performed by: DERMATOLOGY

## 2022-11-01 PROCEDURE — 99213 OFFICE O/P EST LOW 20 MIN: CPT | Mod: 25 | Performed by: DERMATOLOGY

## 2022-11-01 ASSESSMENT — PAIN SCALES - GENERAL: PAINLEVEL: NO PAIN (0)

## 2022-11-01 NOTE — LETTER
11/1/2022         RE: Marlene Millan  [address] [facility] [address]        Dear Colleague,    Thank you for referring your patient, Marlene Millan, to the [facility]. Please see a copy of my visit note below.    Marlene Millan is an extremely pleasant 82 year old year old female patient here today for rough spots on neck.   .   Patient states this has been present for a while.  Patient reports the following symptoms:  itching.  Patient reports the following previous treatments none.  These treatments did not work.  Patient reports the following modifying factors none.  Associated symptoms: none.  Patient has no other skin complaints today.  Remainder of the HPI, Meds, PMH, Allergies, FH, and SH was reviewed in chart.      Past Medical History:   Diagnosis Date     Basal cell carcinoma      Hypertension 6/7/2010     Macular degeneration      PONV (postoperative nausea and vomiting)      Pulmonary embolism (H)      Pulmonary embolism, bilateral (H) 2/19/2012    Post-surgical at [facility] following total knee replacement      Shingles outbreak December 2016       Past Surgical History:   Procedure Laterality Date     basal cell cancer of nose  Oct 2012     BREAST SURGERY      breast reduction     CARPAL TUNNEL RELEASE RT/LT      right     COMBINED REPAIR PTOSIS WITH BLEPHAROPLASTY BILATERAL Bilateral 6/30/2015    Procedure: COMBINED REPAIR PTOSIS WITH BLEPHAROPLASTY BILATERAL;  Surgeon: Ilir Marvin MD;  Location: [facility]     HC REMOVAL GALLBLADDER       LIGATE VEIN VARICOSE, PHLEBECTOMY MULTIPLE STAB, COMBINED Bilateral 8/13/2015    Procedure: COMBINED LIGATE VEIN VARICOSE, PHLEBECTOMY MULTIPLE STAB;  Surgeon: Alphonse Pro MD;  Location: [facility]     ORTHOPEDIC SURGERY      bilateral knee     PHACOEMULSIFICATION WITH STANDARD INTRAOCULAR LENS IMPLANT Right 12/21/2017    Procedure: PHACOEMULSIFICATION WITH STANDARD INTRAOCULAR LENS IMPLANT;  Right  Cataract Removal with Implant;  Surgeon: Clif Michel MD;  Location: [facility]     SURGICAL HISTORY OF -       breast reduction mammoplasty      SURGICAL HISTORY OF -       bilateral lower extremity vein stripping     SURGICAL HISTORY OF -   2010    left total knee arthroplasty        Family History   Problem Relation Age of Onset     Diabetes Father         last both legs to the disease     Heart Disease Father          age 91       Social History     Socioeconomic History     Marital status:      Spouse name: Not on file     Number of children: Not on file     Years of education: Not on file     Highest education level: Not on file   Occupational History     Not on file   Tobacco Use     Smoking status: Former     Years: 20.00     Types: Cigarettes     Quit date: 1982     Years since quittin.7     Smokeless tobacco: Never   Vaping Use     Vaping Use: Never used   Substance and Sexual Activity     Alcohol use: Yes     Comment: occ wine     Drug use: No     Sexual activity: Not Currently   Other Topics Concern     Parent/sibling w/ CABG, MI or angioplasty before 65F 55M? No   Social History Narrative     Not on file     Social Determinants of Health     Financial Resource Strain: Not on file   Food Insecurity: Not on file   Transportation Needs: Not on file   Physical Activity: Not on file   Stress: Not on file   Social Connections: Not on file   Intimate Partner Violence: Not on file   Housing Stability: Not on file       Outpatient Encounter Medications as of 2022   Medication Sig Dispense Refill     amLODIPine (NORVASC) 5 MG tablet Take 1 tablet (5 mg) by mouth daily 90 tablet 1     apixaban ANTICOAGULANT (ELIQUIS ANTICOAGULANT) 2.5 MG tablet Take 1 tablet (2.5 mg) by mouth 2 times daily 60 tablet 0     atorvastatin (LIPITOR) 40 MG tablet Take 1 tablet (40 mg) by mouth daily 90 tablet 4     Cholecalciferol (VITAMIN D3) 25 MCG (1000 UT) CAPS Take 1 capsule by mouth daily        FIBER ADULT GUMMIES PO Take 2 chew tab by mouth daily       Multiple Vitamins-Minerals (HAIR SKIN AND NAILS FORMULA PO) Take 2 chew tab by mouth       Multiple Vitamins-Minerals (MACULAR VITAMIN BENEFIT PO) Take 1 tablet by mouth daily Focus MaculaPro       valACYclovir (VALTREX) 1000 mg tablet 2 tabs at onset of cold sores. Repeat dose after 12 hours. 12 tablet 4     No facility-administered encounter medications on file as of 11/1/2022.             O:   NAD, WDWN, Alert & Oriented, Mood & Affect wnl, Vitals stable   Here today alone   General appearance normal   Vitals stable   Alert, oriented and in no acute distress     Healing belph sites upper lid bl   Inflamed seborrheic keratosis on neck      Stuck on papules and brown macules on trunk and ext   Red papules on chest  Flesh colored papules on face      Eyes: Conjunctivae/lids:Normal     ENT: Lips, buccal mucosa, tongue: normal    MSK:Normal    Cardiovascular: peripheral edema none    Pulm: Breathing Normal    Neuro/Psych: Orientation:Alert and Orientedx3 ; Mood/Affect:normal       A/P:  1. Seborrheic keratosis, lentigo, angioma, dermal nevus, hx of non-melanoma skin cancer   2. Inflamed seborrheic keratosis neck x15  LN2:  Treated with LN2 for 5s for 1-2 cycles. Warned risks of blistering, pain, pigment change, scarring, and incomplete resolution.  Advised patient to return if lesions do not completely resolve.  Wound care sheet given.  It was a pleasure speaking to Marlene Millan today.  Previous clinic notes and pertinent laboratory tests were reviewed prior to Marlene Millan's visit.  Nature of benign skin lesions dicussed with patient.  Patient encouraged to perform monthly skin exams.  UV precautions reviewed with patient.  Return to clinic 2 months        Again, thank you for allowing me to participate in the care of your patient.        Sincerely,        Errol Anderson MD

## 2022-11-01 NOTE — PROGRESS NOTES
Marlene Millan is an extremely pleasant 82 year old year old female patient here today for rough spots on neck.   .   Patient states this has been present for a while.  Patient reports the following symptoms:  itching.  Patient reports the following previous treatments none.  These treatments did not work.  Patient reports the following modifying factors none.  Associated symptoms: none.  Patient has no other skin complaints today.  Remainder of the HPI, Meds, PMH, Allergies, FH, and SH was reviewed in chart.      Past Medical History:   Diagnosis Date     Basal cell carcinoma      Hypertension 6/7/2010     Macular degeneration      PONV (postoperative nausea and vomiting)      Pulmonary embolism (H)      Pulmonary embolism, bilateral (H) 2/19/2012    Post-surgical at [facility] following total knee replacement      Shingles outbreak December 2016       Past Surgical History:   Procedure Laterality Date     basal cell cancer of nose  Oct 2012     BREAST SURGERY      breast reduction     CARPAL TUNNEL RELEASE RT/LT      right     COMBINED REPAIR PTOSIS WITH BLEPHAROPLASTY BILATERAL Bilateral 6/30/2015    Procedure: COMBINED REPAIR PTOSIS WITH BLEPHAROPLASTY BILATERAL;  Surgeon: Ilir Marvin MD;  Location: [facility]     HC REMOVAL GALLBLADDER       LIGATE VEIN VARICOSE, PHLEBECTOMY MULTIPLE STAB, COMBINED Bilateral 8/13/2015    Procedure: COMBINED LIGATE VEIN VARICOSE, PHLEBECTOMY MULTIPLE STAB;  Surgeon: Alphonse Pro MD;  Location: [facility]     ORTHOPEDIC SURGERY      bilateral knee     PHACOEMULSIFICATION WITH STANDARD INTRAOCULAR LENS IMPLANT Right 12/21/2017    Procedure: PHACOEMULSIFICATION WITH STANDARD INTRAOCULAR LENS IMPLANT;  Right Cataract Removal with Implant;  Surgeon: Clif Michel MD;  Location: [facility]     SURGICAL HISTORY OF -       breast reduction mammoplasty 1990s     SURGICAL HISTORY OF -       bilateral lower extremity vein stripping     SURGICAL HISTORY OF -   2/22/2010     left total knee arthroplasty        Family History   Problem Relation Age of Onset     Diabetes Father         last both legs to the disease     Heart Disease Father          age 91       Social History     Socioeconomic History     Marital status:      Spouse name: Not on file     Number of children: Not on file     Years of education: Not on file     Highest education level: Not on file   Occupational History     Not on file   Tobacco Use     Smoking status: Former     Years: 20.00     Types: Cigarettes     Quit date: 1982     Years since quittin.7     Smokeless tobacco: Never   Vaping Use     Vaping Use: Never used   Substance and Sexual Activity     Alcohol use: Yes     Comment: occ wine     Drug use: No     Sexual activity: Not Currently   Other Topics Concern     Parent/sibling w/ CABG, MI or angioplasty before 65F 55M? No   Social History Narrative     Not on file     Social Determinants of Health     Financial Resource Strain: Not on file   Food Insecurity: Not on file   Transportation Needs: Not on file   Physical Activity: Not on file   Stress: Not on file   Social Connections: Not on file   Intimate Partner Violence: Not on file   Housing Stability: Not on file       Outpatient Encounter Medications as of 2022   Medication Sig Dispense Refill     amLODIPine (NORVASC) 5 MG tablet Take 1 tablet (5 mg) by mouth daily 90 tablet 1     apixaban ANTICOAGULANT (ELIQUIS ANTICOAGULANT) 2.5 MG tablet Take 1 tablet (2.5 mg) by mouth 2 times daily 60 tablet 0     atorvastatin (LIPITOR) 40 MG tablet Take 1 tablet (40 mg) by mouth daily 90 tablet 4     Cholecalciferol (VITAMIN D3) 25 MCG (1000 UT) CAPS Take 1 capsule by mouth daily       FIBER ADULT GUMMIES PO Take 2 chew tab by mouth daily       Multiple Vitamins-Minerals (HAIR SKIN AND NAILS FORMULA PO) Take 2 chew tab by mouth       Multiple Vitamins-Minerals (MACULAR VITAMIN BENEFIT PO) Take 1 tablet by mouth daily Focus MaculaPro        valACYclovir (VALTREX) 1000 mg tablet 2 tabs at onset of cold sores. Repeat dose after 12 hours. 12 tablet 4     No facility-administered encounter medications on file as of 11/1/2022.             O:   NAD, WDWN, Alert & Oriented, Mood & Affect wnl, Vitals stable   Here today alone   General appearance normal   Vitals stable   Alert, oriented and in no acute distress     Healing belph sites upper lid bl   Inflamed seborrheic keratosis on neck      Stuck on papules and brown macules on trunk and ext   Red papules on chest  Flesh colored papules on face      Eyes: Conjunctivae/lids:Normal     ENT: Lips, buccal mucosa, tongue: normal    MSK:Normal    Cardiovascular: peripheral edema none    Pulm: Breathing Normal    Neuro/Psych: Orientation:Alert and Orientedx3 ; Mood/Affect:normal       A/P:  1. Seborrheic keratosis, lentigo, angioma, dermal nevus, hx of non-melanoma skin cancer   2. Inflamed seborrheic keratosis neck x15  LN2:  Treated with LN2 for 5s for 1-2 cycles. Warned risks of blistering, pain, pigment change, scarring, and incomplete resolution.  Advised patient to return if lesions do not completely resolve.  Wound care sheet given.  It was a pleasure speaking to Marlene Millan today.  Previous clinic notes and pertinent laboratory tests were reviewed prior to Marlene Millan's visit.  Nature of benign skin lesions dicussed with patient.  Patient encouraged to perform monthly skin exams.  UV precautions reviewed with patient.  Return to clinic 2 months

## 2022-11-03 ENCOUNTER — HOSPITAL ENCOUNTER (OUTPATIENT)
Dept: PHYSICAL THERAPY | Facility: CLINIC | Age: 82
Setting detail: THERAPIES SERIES
Discharge: HOME | End: 2022-11-03
Attending: PEDIATRICS
Payer: COMMERCIAL

## 2022-11-03 DIAGNOSIS — M75.121 NONTRAUMATIC COMPLETE TEAR OF RIGHT ROTATOR CUFF: ICD-10-CM

## 2022-11-03 DIAGNOSIS — G89.29 CHRONIC RIGHT SHOULDER PAIN: ICD-10-CM

## 2022-11-03 DIAGNOSIS — M25.511 CHRONIC RIGHT SHOULDER PAIN: ICD-10-CM

## 2022-11-03 PROCEDURE — 97161 PT EVAL LOW COMPLEX 20 MIN: CPT | Mod: GP

## 2022-11-03 PROCEDURE — 97110 THERAPEUTIC EXERCISES: CPT | Mod: GP

## 2022-11-03 NOTE — PROGRESS NOTES
11/03/22 1100   General Information   Type of Visit Initial OP Ortho PT Evaluation   Start of Care Date 11/03/22   Referring Physician Eboni Lowe MD   Patient/Family Goals Statement strengthen R shld   Orders Evaluate and Treat   Date of Order 08/12/22   Certification Required? 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 She has had R shld pain ever since.   Impairments D. Decreased ROM;F. Decreased strength and endurance;A. Pain   Functional Limitations perform activities of daily living;perform desired leisure / sports activities   Symptom Location R lat shld   How/Where did it occur With a fall   Onset date of current episode/exacerbation 12/25/21   Chronicity Chronic   Pain rating (0-10 point scale) Best (/10);Worst (/10)  (currently 1/10)   Best (/10) 0   Worst (/10) 3   Pain quality C. Aching   Frequency of pain/symptoms C. With activity   Pain/symptoms are: Worse during the night   Pain/symptoms exacerbated by C. Lifting;D. Carrying;K. Home tasks   Pain/symptoms eased by I. OTC medication(s)   Progression of symptoms since onset: Improved   Current / Previous Interventions   Diagnostic Tests: MRI   MRI Results Results   MRI results Impression:  1. Full-thickness, full width tear of the entire supraspinatus tendon  and full-thickness tear of the superior infraspinatus tendon with  tendon retraction to the glenohumeral joint line. Supraspinatus and  superior infraspinatus muscle bellies are atrophic, consistent this  chronic tearing.  2. Low-grade intrasubstance tear of the far superior fibers of the  subscapularis tendon, muscle belly preserved.  3. Preserved inferior  infraspinatus tendon and teres minor tendon.  4. No significant joint effusion.  5. Attenuated biceps tendon. Significant tearing about the bicipital  labral anchor.   Prior Level of Function   Functional Level Prior Comment independent   Current Level of Function   Current Community Support Family/friend caregiver   Patient role/employment history A. Employed   Employment Comments realtor   [facility]   Home/community accessibility drives   Fall Risk Screen   Fall screen completed by PT   Have you fallen 2 or more times in the past year? No   Have you fallen and had an injury in the past year? Yes   Timed Up and Go score (seconds) 10   Is patient a fall risk? No   Fall screen comments slipped on ice   Abuse Screen (yes response referral indicated)   Feels Unsafe at Home or Work/School no   Feels Threatened by Someone no   Does Anyone Try to Keep You From Having Contact with Others or Doing Things Outside Your Home? no   Physical Signs of Abuse Present no   Functional Scales   Functional Scales Other   Other Scales  SPADI 12.31   Shoulder Objective Findings   Side (if bilateral, select both right and left) Right   Neer's Test -   Miller-Merrill Test -   New Underwood's Test -   Crossover Test -   Shoulder Special Tests Comments + Speeds test   Palpation very tender GH biceps tendon R>L, R infraspinatus tendon insertion   Posture fwd head, rounded shlds   Right Shoulder Flexion AROM 150*   Right Shoulder Flexion PROM 176*   Right Shoulder Abduction AROM 100*   Right Shoulder Abduction PROM 112*   Right Shoulder ER AROM C3   Right Shoulder ER PROM 82*   Right Shoulder IR AROM L1   Right Shoulder IR PROM 90*   Right Shoulder Flexion Strength 3/5   Right Shoulder Abduction Strength 5/5   Right Shoulder ER Strength 3+/5   Right Shoulder IR Strength 5-/5   Planned Therapy Interventions   Planned Therapy Interventions strengthening;ROM;manual therapy   Clinical Impression   Criteria for Skilled  Therapeutic Interventions Met yes, treatment indicated   PT Diagnosis R shld weakness due to RCT   Influenced by the following impairments pain, weakness   Functional limitations due to impairments lifting dishes onto high shelf, sleeping, pushing, carrying   Clinical Presentation Stable/Uncomplicated   Clinical Presentation Rationale clinical judgement   Clinical Decision Making (Complexity) Low complexity   Therapy Frequency 1 time/week   Predicted Duration of Therapy Intervention (days/wks) 12 weeks   Risk & Benefits of therapy have been explained Yes   Patient, Family & other staff in agreement with plan of care Yes   Education Assessment   Preferred Learning Style Listening;Demonstration;Pictures/video   Barriers to Learning No barriers   ORTHO GOALS   PT Ortho Eval Goals 1;2;3;4   Ortho Goal 1   Goal Identifier 1   Goal Description Pt will be able to sleep through the night without waking with pain.   Target Date 12/15/22   Ortho Goal 2   Goal Identifier 2   Goal Description Pt will be able to carry 10# with < 2/10 pain.   Target Date 01/05/23   Ortho Goal 3   Goal Identifier 3   Goal Description Pt will be able to lift 3# overhead for increased ease with household tasks.   Target Date 01/26/23   Ortho Goal 4   Goal Identifier 4   Goal Description Pt will be independent with HEP for optimal functional recovery.   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

## 2022-11-03 NOTE — PROGRESS NOTES
[facility]    OUTPATIENT PHYSICAL THERAPY ORTHOPEDIC EVALUATION  PLAN OF TREATMENT FOR OUTPATIENT REHABILITATION  (COMPLETE FOR INITIAL CLAIMS ONLY)  Patient's Last Name, First Name, M.I.  YOB: 1940  Marlene Millan    Provider s Name:  [facility]   Medical Record No.  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  Target Date: 12/15/22    Goal Identifier: 2  Goal Description: Pt will be able to carry 10# with < 2/10 pain.  Target Date: 01/05/23    Goal Identifier: 3  Goal Description: Pt will be able to lift 3# overhead for increased ease with household tasks.  Target Date: 01/26/23    Goal Identifier: 4  Goal Description: Pt will be independent with HEP for optimal functional recovery.  Target Date: 01/26/23                                                Therapy Frequency:  1 time/week  Predicted Duration of Therapy Intervention:  12 weeks    Genna Fuentes PT                 I CERTIFY THE NEED FOR THESE SERVICES FURNISHED UNDER        THIS PLAN OF TREATMENT AND WHILE UNDER MY CARE     (Physician co-signature of this document indicates review and certification of the therapy plan).                       Certification Date From:  11/03/22   Certification Date To:  01/26/23    Referring Provider:  Eboni Lowe MD    Initial Assessment        See Epic Evaluation  Start of Care Date: 11/03/22

## 2022-11-09 NOTE — RESULT ENCOUNTER NOTE
Please mail result note.  Patient has not viewed it in my chart. Please also include the following along with result note: Results were released with recommendations via My Chart. My Chart alerted me that you have not yet viewed these so I am mailing them as well.

## 2022-11-10 ENCOUNTER — HOSPITAL ENCOUNTER (OUTPATIENT)
Dept: PHYSICAL THERAPY | Facility: CLINIC | Age: 82
Setting detail: THERAPIES SERIES
Discharge: HOME | End: 2022-11-10
Attending: PEDIATRICS
Payer: COMMERCIAL

## 2022-11-10 PROCEDURE — 97110 THERAPEUTIC EXERCISES: CPT | Mod: GP

## 2022-11-17 ENCOUNTER — HOSPITAL ENCOUNTER (OUTPATIENT)
Dept: PHYSICAL THERAPY | Facility: CLINIC | Age: 82
Setting detail: THERAPIES SERIES
Discharge: HOME | End: 2022-11-17
Attending: PEDIATRICS
Payer: COMMERCIAL

## 2022-11-17 PROCEDURE — 97110 THERAPEUTIC EXERCISES: CPT | Mod: GP

## 2022-11-22 ENCOUNTER — ALLIED HEALTH/NURSE VISIT (OUTPATIENT)
Dept: FAMILY MEDICINE | Facility: CLINIC | Age: 82
End: 2022-11-22
Payer: COMMERCIAL

## 2022-11-22 DIAGNOSIS — Z23 HIGH PRIORITY FOR 2019-NCOV VACCINE: Primary | ICD-10-CM

## 2022-11-22 PROCEDURE — 91312 COVID-19 VACCINE BIVALENT BOOSTER 12+ (PFIZER): CPT

## 2022-11-22 PROCEDURE — 0124A COVID-19 VACCINE BIVALENT BOOSTER 12+ (PFIZER): CPT

## 2022-11-22 PROCEDURE — 99207 PR NO CHARGE LOS: CPT

## 2022-12-08 ENCOUNTER — HOSPITAL ENCOUNTER (OUTPATIENT)
Dept: PHYSICAL THERAPY | Facility: CLINIC | Age: 82
Setting detail: THERAPIES SERIES
Discharge: HOME | End: 2022-12-08
Attending: PEDIATRICS
Payer: COMMERCIAL

## 2022-12-08 PROCEDURE — 97110 THERAPEUTIC EXERCISES: CPT | Mod: GP

## 2022-12-28 RX ORDER — AMLODIPINE BESYLATE 2.5 MG/1
2.5 TABLET ORAL DAILY
COMMUNITY
Start: 2022-11-05 | End: 2023-01-05

## 2023-01-03 ENCOUNTER — OFFICE VISIT (OUTPATIENT)
Dept: DERMATOLOGY | Facility: CLINIC | Age: 83
End: 2023-01-03
Payer: COMMERCIAL

## 2023-01-03 DIAGNOSIS — L82.1 SEBORRHEIC KERATOSIS: Primary | ICD-10-CM

## 2023-01-03 DIAGNOSIS — L82.0 INFLAMED SEBORRHEIC KERATOSIS: ICD-10-CM

## 2023-01-03 PROCEDURE — 99212 OFFICE O/P EST SF 10 MIN: CPT | Mod: 25 | Performed by: DERMATOLOGY

## 2023-01-03 PROCEDURE — 17111 DESTRUCTION B9 LESIONS 15/>: CPT | Performed by: DERMATOLOGY

## 2023-01-03 ASSESSMENT — PAIN SCALES - GENERAL: PAINLEVEL: NO PAIN (0)

## 2023-01-03 NOTE — PROGRESS NOTES
Marlene Millan is an extremely pleasant 82 year old year old female patient here today for rough spots on neck,  .   Patient states this has been present for a while.  Patient reports the following symptoms:  itching.  Patient reports the following previous treatments cryo.  These treatments did not work.  Patient reports the following modifying factors none.  Associated symptoms: none.  Patient has no other skin complaints today.  Remainder of the HPI, Meds, PMH, Allergies, FH, and SH was reviewed in chart.      Past Medical History:   Diagnosis Date     Basal cell carcinoma      Hypertension 6/7/2010     Macular degeneration      PONV (postoperative nausea and vomiting)      Pulmonary embolism (H)      Pulmonary embolism, bilateral (H) 2/19/2012    Post-surgical at [facility] following total knee replacement      Shingles outbreak December 2016       Past Surgical History:   Procedure Laterality Date     basal cell cancer of nose  Oct 2012     BREAST SURGERY      breast reduction     CARPAL TUNNEL RELEASE RT/LT      right     COMBINED REPAIR PTOSIS WITH BLEPHAROPLASTY BILATERAL Bilateral 6/30/2015    Procedure: COMBINED REPAIR PTOSIS WITH BLEPHAROPLASTY BILATERAL;  Surgeon: Ilir Marvin MD;  Location: [facility]     HC REMOVAL GALLBLADDER       LIGATE VEIN VARICOSE, PHLEBECTOMY MULTIPLE STAB, COMBINED Bilateral 8/13/2015    Procedure: COMBINED LIGATE VEIN VARICOSE, PHLEBECTOMY MULTIPLE STAB;  Surgeon: Alphonse Pro MD;  Location: [facility]     ORTHOPEDIC SURGERY      bilateral knee     PHACOEMULSIFICATION WITH STANDARD INTRAOCULAR LENS IMPLANT Right 12/21/2017    Procedure: PHACOEMULSIFICATION WITH STANDARD INTRAOCULAR LENS IMPLANT;  Right Cataract Removal with Implant;  Surgeon: Clif Michel MD;  Location: [facility]     SURGICAL HISTORY OF -       breast reduction mammoplasty 1990s     SURGICAL HISTORY OF -       bilateral lower extremity vein stripping     SURGICAL HISTORY OF -   2/22/2010     left total knee arthroplasty        Family History   Problem Relation Age of Onset     Diabetes Father         last both legs to the disease     Heart Disease Father          age 91       Social History     Socioeconomic History     Marital status:      Spouse name: Not on file     Number of children: Not on file     Years of education: Not on file     Highest education level: Not on file   Occupational History     Not on file   Tobacco Use     Smoking status: Former     Years: 20.00     Types: Cigarettes     Quit date: 1982     Years since quittin.9     Smokeless tobacco: Never   Vaping Use     Vaping Use: Never used   Substance and Sexual Activity     Alcohol use: Yes     Comment: occ wine     Drug use: No     Sexual activity: Not Currently   Other Topics Concern     Parent/sibling w/ CABG, MI or angioplasty before 65F 55M? No   Social History Narrative     Not on file     Social Determinants of Health     Financial Resource Strain: Not on file   Food Insecurity: Not on file   Transportation Needs: Not on file   Physical Activity: Not on file   Stress: Not on file   Social Connections: Not on file   Intimate Partner Violence: Not on file   Housing Stability: Not on file       Outpatient Encounter Medications as of 1/3/2023   Medication Sig Dispense Refill     amLODIPine (NORVASC) 2.5 MG tablet Take 2.5 mg by mouth daily       amLODIPine (NORVASC) 5 MG tablet Take 1 tablet (5 mg) by mouth daily 90 tablet 1     atorvastatin (LIPITOR) 40 MG tablet Take 1 tablet (40 mg) by mouth daily 90 tablet 4     Cholecalciferol (VITAMIN D3) 25 MCG (1000 UT) CAPS Take 1 capsule by mouth daily       FIBER ADULT GUMMIES PO Take 2 chew tab by mouth daily       Multiple Vitamins-Minerals (HAIR SKIN AND NAILS FORMULA PO) Take 2 chew tab by mouth       Multiple Vitamins-Minerals (MACULAR VITAMIN BENEFIT PO) Take 1 tablet by mouth daily Focus MaculaPro       valACYclovir (VALTREX) 1000 mg tablet 2 tabs at onset of  cold sores. Repeat dose after 12 hours. 12 tablet 4     No facility-administered encounter medications on file as of 1/3/2023.             O:   NAD, WDWN, Alert & Oriented, Mood & Affect wnl, Vitals stable   Here today alone   General appearance normal   Vitals stable   Alert, oriented and in no acute distress   Inflamed seborrheic keratosis on neck     Eyes: Conjunctivae/lids:Normal     ENT: Lips, buccal mucosa, tongue: normal    MSK:Normal    Cardiovascular: peripheral edema none    Pulm: Breathing Normal    Neuro/Psych: Orientation:Alert and Orientedx3 ; Mood/Affect:normal       A/P:  1. Inflamed seborrheic keratosis   Cryo, laser, shave ex discussed with patient   LN2:  Treated with LN2 for 5s for 1-2 cycles. Warned risks of blistering, pain, pigment change, scarring, and incomplete resolution.  Advised patient to return if lesions do not completely resolve.  Wound care sheet given.  15 lesions  It was a pleasure speaking to Marlene Millan today.  Previous clinic notes and pertinent laboratory tests were reviewed prior to Marlene Millan's visit.  Nature of benign skin lesions dicussed with patient.  Return to clinic prn

## 2023-01-03 NOTE — LETTER
1/3/2023         RE: Marlene Millan  [address] [facility] [place] [address]        Dear Colleague,    Thank you for referring your patient, Marlene Millan, to the [facility]. Please see a copy of my visit note below.    Marlene Millan is an extremely pleasant 82 year old year old female patient here today for rough spots on neck,  .   Patient states this has been present for a while.  Patient reports the following symptoms:  itching.  Patient reports the following previous treatments cryo.  These treatments did not work.  Patient reports the following modifying factors none.  Associated symptoms: none.  Patient has no other skin complaints today.  Remainder of the HPI, Meds, PMH, Allergies, FH, and SH was reviewed in chart.      Past Medical History:   Diagnosis Date     Basal cell carcinoma      Hypertension 6/7/2010     Macular degeneration      PONV (postoperative nausea and vomiting)      Pulmonary embolism (H)      Pulmonary embolism, bilateral (H) 2/19/2012    Post-surgical at [facility] following total knee replacement      Shingles outbreak December 2016       Past Surgical History:   Procedure Laterality Date     basal cell cancer of nose  Oct 2012     BREAST SURGERY      breast reduction     CARPAL TUNNEL RELEASE RT/LT      right     COMBINED REPAIR PTOSIS WITH BLEPHAROPLASTY BILATERAL Bilateral 6/30/2015    Procedure: COMBINED REPAIR PTOSIS WITH BLEPHAROPLASTY BILATERAL;  Surgeon: Ilir Marvin MD;  Location: [facility]     HC REMOVAL GALLBLADDER       LIGATE VEIN VARICOSE, PHLEBECTOMY MULTIPLE STAB, COMBINED Bilateral 8/13/2015    Procedure: COMBINED LIGATE VEIN VARICOSE, PHLEBECTOMY MULTIPLE STAB;  Surgeon: Alphonse Pro MD;  Location: [facility] OR     ORTHOPEDIC SURGERY      bilateral knee     PHACOEMULSIFICATION WITH STANDARD INTRAOCULAR LENS IMPLANT Right 12/21/2017    Procedure: PHACOEMULSIFICATION WITH STANDARD INTRAOCULAR LENS IMPLANT;  Right Cataract  Removal with Implant;  Surgeon: Clif Michel MD;  Location: [facility] OR     SURGICAL HISTORY OF -       breast reduction mammoplasty      SURGICAL HISTORY OF -       bilateral lower extremity vein stripping     SURGICAL HISTORY OF -   2010    left total knee arthroplasty        Family History   Problem Relation Age of Onset     Diabetes Father         last both legs to the disease     Heart Disease Father          age 91       Social History     Socioeconomic History     Marital status:      Spouse name: Not on file     Number of children: Not on file     Years of education: Not on file     Highest education level: Not on file   Occupational History     Not on file   Tobacco Use     Smoking status: Former     Years: 20.00     Types: Cigarettes     Quit date: 1982     Years since quittin.9     Smokeless tobacco: Never   Vaping Use     Vaping Use: Never used   Substance and Sexual Activity     Alcohol use: Yes     Comment: occ wine     Drug use: No     Sexual activity: Not Currently   Other Topics Concern     Parent/sibling w/ CABG, MI or angioplasty before 65F 55M? No   Social History Narrative     Not on file     Social Determinants of Health     Financial Resource Strain: Not on file   Food Insecurity: Not on file   Transportation Needs: Not on file   Physical Activity: Not on file   Stress: Not on file   Social Connections: Not on file   Intimate Partner Violence: Not on file   Housing Stability: Not on file       Outpatient Encounter Medications as of 1/3/2023   Medication Sig Dispense Refill     amLODIPine (NORVASC) 2.5 MG tablet Take 2.5 mg by mouth daily       amLODIPine (NORVASC) 5 MG tablet Take 1 tablet (5 mg) by mouth daily 90 tablet 1     atorvastatin (LIPITOR) 40 MG tablet Take 1 tablet (40 mg) by mouth daily 90 tablet 4     Cholecalciferol (VITAMIN D3) 25 MCG (1000 UT) CAPS Take 1 capsule by mouth daily       FIBER ADULT GUMMIES PO Take 2 chew tab by mouth daily        Multiple Vitamins-Minerals (HAIR SKIN AND NAILS FORMULA PO) Take 2 chew tab by mouth       Multiple Vitamins-Minerals (MACULAR VITAMIN BENEFIT PO) Take 1 tablet by mouth daily Focus MaculaPro       valACYclovir (VALTREX) 1000 mg tablet 2 tabs at onset of cold sores. Repeat dose after 12 hours. 12 tablet 4     No facility-administered encounter medications on file as of 1/3/2023.             O:   NAD, WDWN, Alert & Oriented, Mood & Affect wnl, Vitals stable   Here today alone   General appearance normal   Vitals stable   Alert, oriented and in no acute distress   Inflamed seborrheic keratosis on neck     Eyes: Conjunctivae/lids:Normal     ENT: Lips, buccal mucosa, tongue: normal    MSK:Normal    Cardiovascular: peripheral edema none    Pulm: Breathing Normal    Neuro/Psych: Orientation:Alert and Orientedx3 ; Mood/Affect:normal       A/P:  1. Inflamed seborrheic keratosis   Cryo, laser, shave ex discussed with patient   LN2:  Treated with LN2 for 5s for 1-2 cycles. Warned risks of blistering, pain, pigment change, scarring, and incomplete resolution.  Advised patient to return if lesions do not completely resolve.  Wound care sheet given.  15 lesions  It was a pleasure speaking to Marlene Millan today.  Previous clinic notes and pertinent laboratory tests were reviewed prior to Marlene Millan's visit.  Nature of benign skin lesions dicussed with patient.  Return to clinic prn         Again, thank you for allowing me to participate in the care of your patient.        Sincerely,        Errol Anderson MD

## 2023-01-03 NOTE — NURSING NOTE
Marlene Millan's chief complaint for this visit includes:  Chief Complaint   Patient presents with     Derm Problem     Would like to address cryo verses laser.      PCP: Katy Rodriguez    Referring Provider:  Referred Self, MD  No address on file    There were no vitals taken for this visit.  No Pain (0)        Allergies   Allergen Reactions     Erythromycin Itching     Sulfa Drugs Visual Disturbance     Affected eyesight         Do you need any medication refills at today's visit? No    Reina Alvarado MA

## 2023-01-05 ENCOUNTER — OFFICE VISIT (OUTPATIENT)
Dept: FAMILY MEDICINE | Facility: CLINIC | Age: 83
End: 2023-01-05
Payer: COMMERCIAL

## 2023-01-05 ENCOUNTER — HOSPITAL ENCOUNTER (OUTPATIENT)
Dept: PHYSICAL THERAPY | Facility: CLINIC | Age: 83
Setting detail: THERAPIES SERIES
Discharge: HOME | End: 2023-01-05
Attending: PEDIATRICS
Payer: COMMERCIAL

## 2023-01-05 VITALS
HEIGHT: 63 IN | HEART RATE: 66 BPM | OXYGEN SATURATION: 99 % | SYSTOLIC BLOOD PRESSURE: 144 MMHG | BODY MASS INDEX: 29.77 KG/M2 | RESPIRATION RATE: 16 BRPM | WEIGHT: 168 LBS | DIASTOLIC BLOOD PRESSURE: 80 MMHG | TEMPERATURE: 97.7 F

## 2023-01-05 DIAGNOSIS — I26.99 PULMONARY EMBOLISM, UNSPECIFIED CHRONICITY, UNSPECIFIED PULMONARY EMBOLISM TYPE, UNSPECIFIED WHETHER ACUTE COR PULMONALE PRESENT (H): ICD-10-CM

## 2023-01-05 DIAGNOSIS — Z12.11 SCREEN FOR COLON CANCER: ICD-10-CM

## 2023-01-05 DIAGNOSIS — Z01.818 PREOP GENERAL PHYSICAL EXAM: Primary | ICD-10-CM

## 2023-01-05 DIAGNOSIS — I10 ESSENTIAL HYPERTENSION: ICD-10-CM

## 2023-01-05 DIAGNOSIS — K21.9 GASTROESOPHAGEAL REFLUX DISEASE, UNSPECIFIED WHETHER ESOPHAGITIS PRESENT: ICD-10-CM

## 2023-01-05 PROCEDURE — 99213 OFFICE O/P EST LOW 20 MIN: CPT | Performed by: FAMILY MEDICINE

## 2023-01-05 PROCEDURE — 97110 THERAPEUTIC EXERCISES: CPT | Mod: GP

## 2023-01-05 RX ORDER — AMLODIPINE BESYLATE 5 MG/1
5 TABLET ORAL DAILY
Qty: 90 TABLET | Refills: 1 | Status: SHIPPED | OUTPATIENT
Start: 2023-01-05 | End: 2023-09-20

## 2023-01-05 NOTE — PATIENT INSTRUCTIONS
For informational purposes only. Not to replace the advice of your health care provider. Copyright   2003,  [facility]. All rights reserved. Clinically reviewed by Yaa Frye MD. [employer] [id number] - REV .  Preparing for Your Surgery  Getting started  A nurse will call you to review your health history and instructions. They will give you an arrival time based on your scheduled surgery time. Please be ready to share:    Your doctor's clinic name and phone number    Your medical, surgical, and anesthesia history    A list of allergies and sensitivities    A list of medicines, including herbal treatments and over-the-counter drugs    Whether the patient has a legal guardian (ask how to send us the papers in advance)  Please tell us if you're pregnant--or if there's any chance you might be pregnant. Some surgeries may injure a fetus (unborn baby), so they require a pregnancy test. Surgeries that are safe for a fetus don't always need a test, and you can choose whether to have one.   If you have a child who's having surgery, please ask for a copy of Preparing for Your Child's Surgery.    Preparing for surgery    Within 10 to 30 days of surgery: Have a pre-op exam (sometimes called an H&P, or History and Physical). This can be done at a clinic or pre-operative center.  ? If you're having a , you may not need this exam. Talk to your care team.    At your pre-op exam, talk to your care team about all medicines you take. If you need to stop any medicines before surgery, ask when to start taking them again.  ? We do this for your safety. Many medicines can make you bleed too much during surgery. Some change how well surgery (anesthesia) drugs work.    Call your insurance company to let them know you're having surgery. (If you don't have insurance, call [phone].)    Call your clinic if there's any change in your health. This includes signs of a cold or flu (sore throat, runny nose,  cough, rash, fever). It also includes a scrape or scratch near the surgery site.    If you have questions on the day of surgery, call your hospital or surgery center.  Eating and drinking guidelines  For your safety: Unless your surgeon tells you otherwise, follow the guidelines below.    Eat and drink as usual until 8 hours before you arrive for surgery. After that, no food or milk.    Drink clear liquids until 2 hours before you arrive. These are liquids you can see through, like water, Gatorade, and Propel Water. They also include plain black coffee and tea (no cream or milk), candy, and breath mints. You can spit out gum when you arrive.    If you drink alcohol: Stop drinking it the night before surgery.    If your care team tells you to take medicine on the morning of surgery, it's okay to take it with a sip of water.  Preventing infection    Shower or bathe the night before and morning of your surgery. Follow the instructions your clinic gave you. (If no instructions, use regular soap.)    Don't shave or clip hair near your surgery site. We'll remove the hair if needed.    Don't smoke or vape the morning of surgery. You may chew nicotine gum up to 2 hours before surgery. A nicotine patch is okay.  ? Note: Some surgeries require you to completely quit smoking and nicotine. Check with your surgeon.    Your care team will make every effort to keep you safe from infection. We will:  ? Clean our hands often with soap and water (or an alcohol-based hand rub).  ? Clean the skin at your surgery site with a special soap that kills germs.  ? Give you a special gown to keep you warm. (Cold raises the risk of infection.)  ? Wear special hair covers, masks, gowns and gloves during surgery.  ? Give antibiotic medicine, if prescribed. Not all surgeries need antibiotics.  What to bring on the day of surgery    Photo ID and insurance card    Copy of your health care directive, if you have one    Glasses and hearing aids (bring  cases)  ? You can't wear contacts during surgery    Inhaler and eye drops, if you use them (tell us about these when you arrive)    CPAP machine or breathing device, if you use them    A few personal items, if spending the night    If you have . . .  ? A pacemaker, ICD (cardiac defibrillator) or other implant: Bring the ID card.  ? An implanted stimulator: Bring the remote control.  ? A legal guardian: Bring a copy of the certified (court-stamped) guardianship papers.  Please remove any jewelry, including body piercings. Leave jewelry and other valuables at home.  If you're going home the day of surgery    You must have a responsible adult drive you home. They should stay with you overnight as well.    If you don't have someone to stay with you, and you aren't safe to go home alone, we may keep you overnight. Insurance often won't pay for this.  After surgery  If it's hard to control your pain or you need more pain medicine, please call your surgeon's office.  Questions?   If you have any questions for your care team, list them here: _________________________________________________________________________________________________________________________________________________________________________ ____________________________________ ____________________________________ ____________________________________

## 2023-01-05 NOTE — PROGRESS NOTES
[facility]  [address] [facility] [address]  Phone: [phone]  Primary Provider: Katy Rodriguez  Pre-op Performing Provider: ROMEO MCCAULEY    956}  PREOPERATIVE EVALUATION:  Today's date: 1/5/2023    Marlene Millan is a 82 year old female who presents for a preoperative evaluation.    Surgical Information:  Surgery/Procedure: Endoscopy, colonoscopy  Surgery Location: [facility]  Surgeon: Dr. MICHAEL Tan  Surgery Date: 01/10/23  Time of Surgery: tbd  Where patient plans to recover: At home with family  Fax number for surgical facility: [phone]    Type of Anesthesia Anticipated: to be determined    Assessment & Plan     The proposed surgical procedure is considered LOW risk.    Preop general physical exam       Screen for colon cancer       Gastroesophageal reflux disease, unspecified whether esophagitis present       Essential hypertension   increase amlodipine to 5 mg daily   - amLODIPine (NORVASC) 5 MG tablet; Take 1 tablet (5 mg) by mouth daily    Pulmonary embolism, unspecified chronicity, unspecified pulmonary embolism type, unspecified whether acute cor pulmonale present (H)  Almost 1 year ago. On no chronic anticoagulation, no risk with this given low risk procedure and should be up and ambulatory shortly after the procedure.       Medication Instructions:  Patient is to take all scheduled medications on the day of surgery    RECOMMENDATION:  APPROVAL GIVEN to proceed with proposed procedure, without further diagnostic evaluation.      Subjective     HPI related to upcoming procedure: 83 yo female with history of hypertension, hyperlipidemia and h/o hypercoagulability (not on anticoagulant currently) who is due for screening colonoscopy and also has chronic GERD and some vomiting issues - scheduled for EGD to further evaluate that.     Preop Questions 1/5/2023   1. Have you ever had a heart attack or stroke? No   2. Have you ever  had surgery on your heart or blood vessels, such as a stent placement, a coronary artery bypass, or surgery on an artery in your head, neck, heart, or legs? No   3. Do you have chest pain with activity? No   4. Do you have a history of  heart failure? No   5. Do you currently have a cold, bronchitis or symptoms of other infection? No   6. Do you have a cough, shortness of breath, or wheezing? No   7. Do you or anyone in your family have previous history of blood clots? YES - personal history of PE   8. Do you or does anyone in your family have a serious bleeding problem such as prolonged bleeding following surgeries or cuts? No   9. Have you ever had problems with anemia or been told to take iron pills? YES - many years ago   10. Have you had any abnormal blood loss such as black, tarry or bloody stools, or abnormal vaginal bleeding? No   11. Have you ever had a blood transfusion? No   12. Are you willing to have a blood transfusion if it is medically needed before, during, or after your surgery? Yes   13. Have you or any of your relatives ever had problems with anesthesia? No   14. Do you have sleep apnea, excessive snoring or daytime drowsiness? No   15. Do you have any artifical heart valves or other implanted medical devices like a pacemaker, defibrillator, or continuous glucose monitor? No   16. Do you have artificial joints? YES - bilateral knees   17. Are you allergic to latex? No       Health Care Directive:  Patient has a Health Care Directive on file      Preoperative Review of :   reviewed - no record of controlled substances prescribed.      Status of Chronic Conditions:  See problem list for active medical problems.  Problems all longstanding and stable, except as noted/documented.  See ROS for pertinent symptoms related to these conditions.      Review of Systems  CONSTITUTIONAL: NEGATIVE for fever, chills, change in weight  ENT/MOUTH: NEGATIVE for ear, mouth and throat problems  RESP: NEGATIVE  for significant cough or SOB  CV: NEGATIVE for chest pain, palpitations or peripheral edema    Patient Active Problem List    Diagnosis Date Noted     Traumatic tear of right rotator cuff, unspecified tear extent, subsequent encounter 09/30/2022     Priority: Medium     Pain in right upper arm 08/12/2022     Priority: Medium     Acute pain of right shoulder 08/12/2022     Priority: Medium     HSV (herpes simplex virus) infection 04/13/2022     Priority: Medium     Acute pulmonary embolism without acute cor pulmonale, unspecified pulmonary embolism type (H) 02/20/2022     Priority: Medium     Ct 2/20/2022- The study is positive for multiple bilateral pulmonary emboli. No evidence of right heart strain.       Senile nuclear cataract 12/19/2017     Priority: Medium     ASD (atrial septal defect) 12/14/2017     Priority: Medium     [facility] 12/12/2012     Priority: Medium     Jazmin Morgan RN-PHN  FPA / [facility]   [phone]    DX V65.8 REPLACED WITH [address] [facility] HOME (04/08/2013)       Advanced directives, counseling/discussion 06/11/2012     Priority: Medium     Received 2007         Patent foramen ovale 02/27/2012     Priority: Medium     Incidental finding on transesophageal echocardiogram       GERD (gastroesophageal reflux disease) 02/25/2011     Priority: Medium     Controlled by diet       HYPERLIPIDEMIA LDL GOAL <130 10/31/2010     Priority: Medium     Essential hypertension 06/07/2010     Priority: Medium     Macular degeneration 07/14/2008     Priority: Medium     Left eye       Spinal stenosis, lumbar region, without neurogenic claudication 07/14/2008     Priority: Medium      Past Medical History:   Diagnosis Date     Basal cell carcinoma      Hypertension 6/7/2010     Macular degeneration      PONV (postoperative nausea and vomiting)      Pulmonary embolism (H)      Pulmonary embolism, bilateral (H) 2/19/2012    Post-surgical at [facility] following total  knee replacement      Shingles outbreak December 2016     Past Surgical History:   Procedure Laterality Date     basal cell cancer of nose  Oct 2012     BREAST SURGERY      breast reduction     CARPAL TUNNEL RELEASE RT/LT      right     COMBINED REPAIR PTOSIS WITH BLEPHAROPLASTY BILATERAL Bilateral 6/30/2015    Procedure: COMBINED REPAIR PTOSIS WITH BLEPHAROPLASTY BILATERAL;  Surgeon: Ilir Marvin MD;  Location: [facility]     HC REMOVAL GALLBLADDER       LIGATE VEIN VARICOSE, PHLEBECTOMY MULTIPLE STAB, COMBINED Bilateral 8/13/2015    Procedure: COMBINED LIGATE VEIN VARICOSE, PHLEBECTOMY MULTIPLE STAB;  Surgeon: Alphonse Pro MD;  Location: [facility] OR     ORTHOPEDIC SURGERY      bilateral knee     PHACOEMULSIFICATION WITH STANDARD INTRAOCULAR LENS IMPLANT Right 12/21/2017    Procedure: PHACOEMULSIFICATION WITH STANDARD INTRAOCULAR LENS IMPLANT;  Right Cataract Removal with Implant;  Surgeon: Clif Michel MD;  Location: [facility] OR     SURGICAL HISTORY OF -       breast reduction mammoplasty 1990s     SURGICAL HISTORY OF -       bilateral lower extremity vein stripping     SURGICAL HISTORY OF -   2/22/2010    left total knee arthroplasty     Current Outpatient Medications   Medication Sig Dispense Refill     amLODIPine (NORVASC) 2.5 MG tablet Take 2.5 mg by mouth daily       atorvastatin (LIPITOR) 40 MG tablet Take 1 tablet (40 mg) by mouth daily 90 tablet 4     Cholecalciferol (VITAMIN D3) 25 MCG (1000 UT) CAPS Take 1 capsule by mouth daily       FIBER ADULT GUMMIES PO Take 2 chew tab by mouth daily       Multiple Vitamins-Minerals (HAIR SKIN AND NAILS FORMULA PO) Take 2 chew tab by mouth       Multiple Vitamins-Minerals (MACULAR VITAMIN BENEFIT PO) Take 1 tablet by mouth daily Focus MaculaPro       valACYclovir (VALTREX) 1000 mg tablet 2 tabs at onset of cold sores. Repeat dose after 12 hours. 12 tablet 4     amLODIPine (NORVASC) 5 MG tablet Take 1 tablet (5 mg) by mouth daily (Patient not taking: Reported on  "2023) 90 tablet 1       Allergies   Allergen Reactions     Erythromycin Itching     Sulfa Drugs Visual Disturbance     Affected eyesight        Social History     Tobacco Use     Smoking status: Former     Years: 20.00     Types: Cigarettes     Quit date: 1982     Years since quittin.9     Smokeless tobacco: Never   Substance Use Topics     Alcohol use: Yes     Comment: occ wine     Family History   Problem Relation Age of Onset     Diabetes Father         last both legs to the disease     Heart Disease Father          age 91     History   Drug Use No         Objective     BP (!) 144/80 (BP Location: Right arm, Patient Position: Sitting, Cuff Size: Adult Large)   Pulse 66   Temp 97.7  F (36.5  C) (Tympanic)   Resp 16   Ht 1.607 m (5' 3.25\")   Wt 76.2 kg (168 lb)   SpO2 99%   BMI 29.53 kg/m      Physical Exam  GENERAL APPEARANCE: healthy, alert and no distress  HENT: ear canals and TM's normal and nose and mouth without ulcers or lesions  RESP: lungs clear to auscultation - no rales, rhonchi or wheezes  CV: regular rate and rhythm, normal S1 S2, no S3 or S4 and no murmur, click or rub   ABDOMEN: soft, nontender, no HSM or masses and bowel sounds normal  NEURO: Normal strength and tone, sensory exam grossly normal, mentation intact and speech normal    Recent Labs   Lab Test 10/21/22  1412 22  0449 22  1254   HGB 12.3 12.4  --     294  --    INR  --  1.29* 1.40*    139  --    POTASSIUM 4.6 3.9  --    CR 0.85 0.78  --         Diagnostics:  No labs were ordered during this visit.   No EKG required for low risk surgery (cataract, skin procedure, breast biopsy, etc).    Revised Cardiac Risk Index (RCRI):  The patient has the following serious cardiovascular risks for perioperative complications:   - No serious cardiac risks = 0 points     RCRI Interpretation: 0 points: Class I (very low risk - 0.4% complication rate)           Signed Electronically by: Daxa Osborn, " MD  Copy of this evaluation report is provided to requesting physician.

## 2023-01-10 ENCOUNTER — ANESTHESIA (OUTPATIENT)
Dept: SURGERY | Facility: CLINIC | Age: 83
End: 2023-01-10
Payer: COMMERCIAL

## 2023-01-10 ENCOUNTER — ANESTHESIA EVENT (OUTPATIENT)
Dept: SURGERY | Facility: CLINIC | Age: 83
End: 2023-01-10
Payer: COMMERCIAL

## 2023-01-10 ENCOUNTER — HOSPITAL ENCOUNTER (OUTPATIENT)
Facility: CLINIC | Age: 83
Discharge: HOME | End: 2023-01-10
Attending: INTERNAL MEDICINE | Admitting: INTERNAL MEDICINE
Payer: COMMERCIAL

## 2023-01-10 VITALS
OXYGEN SATURATION: 100 % | RESPIRATION RATE: 15 BRPM | WEIGHT: 163 LBS | BODY MASS INDEX: 28.88 KG/M2 | SYSTOLIC BLOOD PRESSURE: 122 MMHG | DIASTOLIC BLOOD PRESSURE: 63 MMHG | TEMPERATURE: 96.7 F | HEIGHT: 63 IN | HEART RATE: 66 BPM

## 2023-01-10 LAB
COLONOSCOPY: NORMAL
UPPER GI ENDOSCOPY: NORMAL

## 2023-01-10 PROCEDURE — 360N000075 HC SURGERY LEVEL 2, PER MIN: Performed by: INTERNAL MEDICINE

## 2023-01-10 PROCEDURE — 710N000012 HC RECOVERY PHASE 2, PER MINUTE: Performed by: INTERNAL MEDICINE

## 2023-01-10 PROCEDURE — 250N000009 HC RX 250: Performed by: NURSE ANESTHETIST, CERTIFIED REGISTERED

## 2023-01-10 PROCEDURE — 370N000017 HC ANESTHESIA TECHNICAL FEE, PER MIN: Performed by: INTERNAL MEDICINE

## 2023-01-10 PROCEDURE — 272N000001 HC OR GENERAL SUPPLY STERILE: Performed by: INTERNAL MEDICINE

## 2023-01-10 PROCEDURE — 258N000003 HC RX IP 258 OP 636: Performed by: ANESTHESIOLOGY

## 2023-01-10 PROCEDURE — 88342 IMHCHEM/IMCYTCHM 1ST ANTB: CPT | Mod: TC | Performed by: INTERNAL MEDICINE

## 2023-01-10 PROCEDURE — 250N000011 HC RX IP 250 OP 636: Performed by: NURSE ANESTHETIST, CERTIFIED REGISTERED

## 2023-01-10 PROCEDURE — 999N000141 HC STATISTIC PRE-PROCEDURE NURSING ASSESSMENT: Performed by: INTERNAL MEDICINE

## 2023-01-10 PROCEDURE — 250N000011 HC RX IP 250 OP 636: Performed by: ANESTHESIOLOGY

## 2023-01-10 RX ORDER — SODIUM CHLORIDE, SODIUM LACTATE, POTASSIUM CHLORIDE, CALCIUM CHLORIDE 600; 310; 30; 20 MG/100ML; MG/100ML; MG/100ML; MG/100ML
INJECTION, SOLUTION INTRAVENOUS CONTINUOUS
Status: DISCONTINUED | OUTPATIENT
Start: 2023-01-10 | End: 2023-01-10 | Stop reason: HOSPADM

## 2023-01-10 RX ORDER — LIDOCAINE HYDROCHLORIDE 20 MG/ML
INJECTION, SOLUTION INFILTRATION; PERINEURAL PRN
Status: DISCONTINUED | OUTPATIENT
Start: 2023-01-10 | End: 2023-01-10

## 2023-01-10 RX ORDER — NALOXONE HYDROCHLORIDE 0.4 MG/ML
0.2 INJECTION, SOLUTION INTRAMUSCULAR; INTRAVENOUS; SUBCUTANEOUS
Status: DISCONTINUED | OUTPATIENT
Start: 2023-01-10 | End: 2023-01-10 | Stop reason: HOSPADM

## 2023-01-10 RX ORDER — ONDANSETRON 2 MG/ML
4 INJECTION INTRAMUSCULAR; INTRAVENOUS EVERY 6 HOURS PRN
Status: DISCONTINUED | OUTPATIENT
Start: 2023-01-10 | End: 2023-01-10 | Stop reason: HOSPADM

## 2023-01-10 RX ORDER — ONDANSETRON 4 MG/1
4 TABLET, ORALLY DISINTEGRATING ORAL EVERY 30 MIN PRN
Status: DISCONTINUED | OUTPATIENT
Start: 2023-01-10 | End: 2023-01-10 | Stop reason: HOSPADM

## 2023-01-10 RX ORDER — FENTANYL CITRATE 50 UG/ML
50 INJECTION, SOLUTION INTRAMUSCULAR; INTRAVENOUS EVERY 5 MIN PRN
Status: DISCONTINUED | OUTPATIENT
Start: 2023-01-10 | End: 2023-01-10 | Stop reason: HOSPADM

## 2023-01-10 RX ORDER — LIDOCAINE HYDROCHLORIDE 10 MG/ML
INJECTION, SOLUTION INFILTRATION; PERINEURAL PRN
Status: DISCONTINUED | OUTPATIENT
Start: 2023-01-10 | End: 2023-01-10

## 2023-01-10 RX ORDER — LYSINE HCL 500 MG
500 TABLET ORAL DAILY
COMMUNITY
End: 2023-03-28

## 2023-01-10 RX ORDER — DEXAMETHASONE SODIUM PHOSPHATE 4 MG/ML
INJECTION, SOLUTION INTRA-ARTICULAR; INTRALESIONAL; INTRAMUSCULAR; INTRAVENOUS; SOFT TISSUE PRN
Status: DISCONTINUED | OUTPATIENT
Start: 2023-01-10 | End: 2023-01-10

## 2023-01-10 RX ORDER — PROPOFOL 10 MG/ML
INJECTION, EMULSION INTRAVENOUS PRN
Status: DISCONTINUED | OUTPATIENT
Start: 2023-01-10 | End: 2023-01-10

## 2023-01-10 RX ORDER — FLUMAZENIL 0.1 MG/ML
0.2 INJECTION, SOLUTION INTRAVENOUS
Status: DISCONTINUED | OUTPATIENT
Start: 2023-01-10 | End: 2023-01-10 | Stop reason: HOSPADM

## 2023-01-10 RX ORDER — NALOXONE HYDROCHLORIDE 0.4 MG/ML
0.4 INJECTION, SOLUTION INTRAMUSCULAR; INTRAVENOUS; SUBCUTANEOUS
Status: DISCONTINUED | OUTPATIENT
Start: 2023-01-10 | End: 2023-01-10 | Stop reason: HOSPADM

## 2023-01-10 RX ORDER — FENTANYL CITRATE 50 UG/ML
25 INJECTION, SOLUTION INTRAMUSCULAR; INTRAVENOUS
Status: DISCONTINUED | OUTPATIENT
Start: 2023-01-10 | End: 2023-01-10 | Stop reason: HOSPADM

## 2023-01-10 RX ORDER — ONDANSETRON 4 MG/1
4 TABLET, ORALLY DISINTEGRATING ORAL EVERY 6 HOURS PRN
Status: DISCONTINUED | OUTPATIENT
Start: 2023-01-10 | End: 2023-01-10 | Stop reason: HOSPADM

## 2023-01-10 RX ORDER — MEPERIDINE HYDROCHLORIDE 25 MG/ML
12.5 INJECTION INTRAMUSCULAR; INTRAVENOUS; SUBCUTANEOUS
Status: DISCONTINUED | OUTPATIENT
Start: 2023-01-10 | End: 2023-01-10 | Stop reason: HOSPADM

## 2023-01-10 RX ORDER — PROCHLORPERAZINE MALEATE 5 MG
5 TABLET ORAL EVERY 6 HOURS PRN
Status: DISCONTINUED | OUTPATIENT
Start: 2023-01-10 | End: 2023-01-10 | Stop reason: HOSPADM

## 2023-01-10 RX ORDER — ONDANSETRON 2 MG/ML
4 INJECTION INTRAMUSCULAR; INTRAVENOUS EVERY 30 MIN PRN
Status: DISCONTINUED | OUTPATIENT
Start: 2023-01-10 | End: 2023-01-10 | Stop reason: HOSPADM

## 2023-01-10 RX ORDER — HALOPERIDOL 5 MG/ML
1 INJECTION INTRAMUSCULAR
Status: DISCONTINUED | OUTPATIENT
Start: 2023-01-10 | End: 2023-01-10 | Stop reason: HOSPADM

## 2023-01-10 RX ORDER — LIDOCAINE 40 MG/G
CREAM TOPICAL
Status: DISCONTINUED | OUTPATIENT
Start: 2023-01-10 | End: 2023-01-10 | Stop reason: HOSPADM

## 2023-01-10 RX ORDER — FENTANYL CITRATE 50 UG/ML
25 INJECTION, SOLUTION INTRAMUSCULAR; INTRAVENOUS EVERY 5 MIN PRN
Status: DISCONTINUED | OUTPATIENT
Start: 2023-01-10 | End: 2023-01-10 | Stop reason: HOSPADM

## 2023-01-10 RX ADMIN — PROPOFOL 50 MG: 10 INJECTION, EMULSION INTRAVENOUS at 09:10

## 2023-01-10 RX ADMIN — ONDANSETRON 4 MG: 2 INJECTION INTRAMUSCULAR; INTRAVENOUS at 09:15

## 2023-01-10 RX ADMIN — SODIUM CHLORIDE, POTASSIUM CHLORIDE, SODIUM LACTATE AND CALCIUM CHLORIDE: 600; 310; 30; 20 INJECTION, SOLUTION INTRAVENOUS at 09:00

## 2023-01-10 RX ADMIN — PROPOFOL 150 MCG/KG/MIN: 10 INJECTION, EMULSION INTRAVENOUS at 09:08

## 2023-01-10 RX ADMIN — PROPOFOL 50 MG: 10 INJECTION, EMULSION INTRAVENOUS at 09:12

## 2023-01-10 RX ADMIN — DEXAMETHASONE SODIUM PHOSPHATE 4 MG: 4 INJECTION, SOLUTION INTRA-ARTICULAR; INTRALESIONAL; INTRAMUSCULAR; INTRAVENOUS; SOFT TISSUE at 09:15

## 2023-01-10 RX ADMIN — LIDOCAINE HYDROCHLORIDE 40 MG: 10 INJECTION, SOLUTION INFILTRATION; PERINEURAL at 09:10

## 2023-01-10 ASSESSMENT — ACTIVITIES OF DAILY LIVING (ADL): ADLS_ACUITY_SCORE: 18

## 2023-01-10 NOTE — H&P
GENERAL PRE-PROCEDURE:   Procedure:  EGD and Colonoscopy  Date/Time:  1/10/2023 8:58 AM    Verbal consent obtained?: Yes    Written consent obtained?: Yes    Risks and benefits: Risks, benefits and alternatives were discussed    Consent given by:  Patient  Patient states understanding of procedure being performed: Yes    Patient's understanding of procedure matches consent: Yes    Procedure consent matches procedure scheduled: Yes    Expected level of sedation:  Moderate  Appropriately NPO:  Yes  ASA Class:  2  Mallampati  :  Grade 1- soft palate, uvula, tonsillar pillars, and posterior pharyngeal wall visible  Lungs:  Lungs clear with good breath sounds bilaterally  Heart:  Normal heart sounds and rate  History & Physical reviewed:  History and physical reviewed and no updates needed  Statement of review:  I have reviewed the lab findings, diagnostic data, medications, and the plan for sedation

## 2023-01-10 NOTE — ANESTHESIA POSTPROCEDURE EVALUATION
Patient: Marlene Millan    Procedure: Procedure(s):  ESOPHAGOGASTRODUODENOSCOPY WITH biopsies and 45 Sinhala Savary Dilation  COLONOSCOPY with cold polypectomies       Anesthesia Type:  MAC    Note:  Disposition: Outpatient   Postop Pain Control: Uneventful            Sign Out: Well controlled pain   PONV: No   Neuro/Psych: Uneventful            Sign Out: Acceptable/Baseline neuro status   Airway/Respiratory: Uneventful            Sign Out: Acceptable/Baseline resp. status   CV/Hemodynamics: Uneventful            Sign Out: Acceptable CV status; No obvious hypovolemia; No obvious fluid overload   Other NRE: NONE   DID A NON-ROUTINE EVENT OCCUR? No           Last vitals:  Vitals Value Taken Time   /63 01/10/23 1029   Temp 35.9  C (96.7  F) 01/10/23 1029   Pulse     Resp 15 01/10/23 1029   SpO2 100 % 01/10/23 1029       Electronically Signed By: Elijah Elizondo MD  January 10, 2023  10:41 AM

## 2023-01-10 NOTE — ANESTHESIA PREPROCEDURE EVALUATION
Anesthesia Pre-Procedure Evaluation    Patient: Marlene Millan   MRN: [id number] : 1940        Procedure : Procedure(s):  ESOPHAGOGASTRODUODENOSCOPY (EGD)          Past Medical History:   Diagnosis Date     Basal cell carcinoma      Hypertension 2010     Macular degeneration      PONV (postoperative nausea and vomiting)      Pulmonary embolism (H)      Pulmonary embolism, bilateral (H) 2012    Post-surgical at [facility] following total knee replacement      Shingles outbreak 2016      Past Surgical History:   Procedure Laterality Date     basal cell cancer of nose  Oct 2012     BREAST SURGERY      breast reduction     CARPAL TUNNEL RELEASE RT/LT      right     COMBINED REPAIR PTOSIS WITH BLEPHAROPLASTY BILATERAL Bilateral 2015    Procedure: COMBINED REPAIR PTOSIS WITH BLEPHAROPLASTY BILATERAL;  Surgeon: Ilir Marvin MD;  Location:  [facility]     HC REMOVAL GALLBLADDER       LIGATE VEIN VARICOSE, PHLEBECTOMY MULTIPLE STAB, COMBINED Bilateral 2015    Procedure: COMBINED LIGATE VEIN VARICOSE, PHLEBECTOMY MULTIPLE STAB;  Surgeon: Alphonse Pro MD;  Location: [facility] OR     ORTHOPEDIC SURGERY      bilateral knee     PHACOEMULSIFICATION WITH STANDARD INTRAOCULAR LENS IMPLANT Right 2017    Procedure: PHACOEMULSIFICATION WITH STANDARD INTRAOCULAR LENS IMPLANT;  Right Cataract Removal with Implant;  Surgeon: Clif Michel MD;  Location: [facility] OR     SURGICAL HISTORY OF -       breast reduction mammoplasty      SURGICAL HISTORY OF -       bilateral lower extremity vein stripping     SURGICAL HISTORY OF -   2010    left total knee arthroplasty      Allergies   Allergen Reactions     Erythromycin Itching     Sulfa Drugs Visual Disturbance     Affected eyesight      Social History     Tobacco Use     Smoking status: Former     Years: 20.00     Types: Cigarettes     Quit date: 1982     Years since quittin.9     Smokeless tobacco: Never   Substance  Use Topics     Alcohol use: Yes     Comment: occ wine      Wt Readings from Last 1 Encounters:   01/05/23 76.2 kg (168 lb)        Anesthesia Evaluation   Pt has had prior anesthetic. Type: General.    History of anesthetic complications  - PONV.      ROS/MED HX  ENT/Pulmonary:     (+) asthma     Neurologic:  - neg neurologic ROS     Cardiovascular:     (+) Dyslipidemia hypertension-----    METS/Exercise Tolerance:     Hematologic:     (+) History of blood clots,     Musculoskeletal:       GI/Hepatic:     (+) GERD,     Renal/Genitourinary:       Endo:       Psychiatric/Substance Use:       Infectious Disease:       Malignancy:       Other:            Physical Exam    Airway        Mallampati: I   TM distance: > 3 FB   Neck ROM: full     Respiratory Devices and Support         Dental  no notable dental history         Cardiovascular   cardiovascular exam normal          Pulmonary   pulmonary exam normal                OUTSIDE LABS:  CBC:   Lab Results   Component Value Date    WBC 9.7 10/21/2022    WBC 7.4 02/22/2022    HGB 12.3 10/21/2022    HGB 12.4 02/22/2022    HCT 38.6 10/21/2022    HCT 38.2 02/22/2022     10/21/2022     02/22/2022     BMP:   Lab Results   Component Value Date     10/21/2022     02/22/2022    POTASSIUM 4.6 10/21/2022    POTASSIUM 3.9 02/22/2022    CHLORIDE 102 10/21/2022    CHLORIDE 106 02/22/2022    CO2 26 10/21/2022    CO2 28 02/22/2022    BUN 19.5 10/21/2022    BUN 21 02/22/2022    CR 0.85 10/21/2022    CR 0.78 02/22/2022     (H) 10/21/2022     (H) 02/22/2022     COAGS:   Lab Results   Component Value Date    PTT 32 02/22/2022    INR 1.29 (H) 02/22/2022     POC: No results found for: BGM, HCG, HCGS  HEPATIC:   Lab Results   Component Value Date    ALBUMIN 3.7 11/30/2021    PROTTOTAL 8.1 11/30/2021    ALT 31 11/30/2021    AST 24 11/30/2021    ALKPHOS 108 11/30/2021    BILITOTAL 0.4 11/30/2021     OTHER:   Lab Results   Component Value Date    YESIKA 9.8  10/21/2022    LIPASE 79 11/30/2021    TSH 1.43 10/21/2022       Anesthesia Plan    ASA Status:  2   NPO Status:  NPO Appropriate    Anesthesia Type: MAC.     - Reason for MAC: straight local not clinically adequate      Maintenance: TIVA.        Consents    Anesthesia Plan(s) and associated risks, benefits, and realistic alternatives discussed. Questions answered and patient/representative(s) expressed understanding.    - Discussed:     - Discussed with:  Patient      - Extended Intubation/Ventilatory Support Discussed: No.      - Patient is DNR/DNI Status: No    Use of blood products discussed: No .     Postoperative Care       PONV prophylaxis: Dexamethasone or Solumedrol, Ondansetron (or other 5HT-3)     Comments:    Other Comments: Decadron, Zofran.  Diprivan infusion.  PE, Ephedrine PRN.                Elijah Elizondo MD

## 2023-01-10 NOTE — ANESTHESIA CARE TRANSFER NOTE
Patient: Marlene Millan    Procedure: Procedure(s):  ESOPHAGOGASTRODUODENOSCOPY WITH biopsies and 45 Romansh Savary Dilation  COLONOSCOPY with cold polypectomies       Diagnosis: Dysphagia [R13.10]  Screen for colon cancer [Z12.11]  Diagnosis Additional Information: No value filed.    Anesthesia Type:   MAC     Note:    Oropharynx: oropharynx clear of all foreign objects    Oxygen Supplementation: room air    Independent Airway: airway patency satisfactory and stable  Dentition: dentition unchanged  Vital Signs Stable: post-procedure vital signs reviewed and stable  Report to RN Given: handoff report given  Patient transferred to: Phase II    Handoff Report: Identifed the Patient, Identified the Reponsible Provider, Reviewed the pertinent medical history, Discussed the surgical course, Reviewed Intra-OP anesthesia mangement and issues during anesthesia, Set expectations for post-procedure period and Allowed opportunity for questions and acknowledgement of understanding      Vitals:  Vitals Value Taken Time   /63 01/10/23 1029   Temp 35.9  C (96.7  F) 01/10/23 1029   Pulse 70    Resp 15 01/10/23 1029   SpO2 100 % 01/10/23 1029       Electronically Signed By: NOBLE BUSTOS CRNA  January 10, 2023  10:34 AM

## 2023-01-10 NOTE — H&P
Preop evaluation from Dr. Osborn Dated 1/5/2023 reviewed.     ENDOSCOPY PRE-PROCEDURE HISTORY & PHYSICAL    CHIEF COMPLAINT / REASON FOR PROCEDURE:  Dysphagia, vomiting, reflux, screening for colon cancer.     PERTINENT HISTORY   Past Medical History:   Diagnosis Date     Basal cell carcinoma      Hypertension 06/07/2010     Macular degeneration      PONV (postoperative nausea and vomiting)      Pulmonary embolism (H)      Pulmonary embolism, bilateral (H) 02/19/2012    Post-surgical at [facility] following total knee replacement      Shingles outbreak 12/2016     Past Surgical History:   Procedure Laterality Date     basal cell cancer of nose  Oct 2012     BREAST SURGERY      breast reduction     CARPAL TUNNEL RELEASE RT/LT      right     COMBINED REPAIR PTOSIS WITH BLEPHAROPLASTY BILATERAL Bilateral 6/30/2015    Procedure: COMBINED REPAIR PTOSIS WITH BLEPHAROPLASTY BILATERAL;  Surgeon: Ilir Marvin MD;  [facility]     HC REMOVAL GALLBLADDER       LIGATE VEIN VARICOSE, PHLEBECTOMY MULTIPLE STAB, COMBINED Bilateral 8/13/2015    Procedure: COMBINED LIGATE VEIN VARICOSE, PHLEBECTOMY MULTIPLE STAB;  Surgeon: Alphonse Pro MD;  Location: [facility]     ORTHOPEDIC SURGERY      bilateral knee     PHACOEMULSIFICATION WITH STANDARD INTRAOCULAR LENS IMPLANT Right 12/21/2017    Procedure: PHACOEMULSIFICATION WITH STANDARD INTRAOCULAR LENS IMPLANT;  Right Cataract Removal with Implant;  Surgeon: Clif Michel MD;  Location: [facility]     SURGICAL HISTORY OF -       breast reduction mammoplasty 1990s     SURGICAL HISTORY OF -       bilateral lower extremity vein stripping     SURGICAL HISTORY OF -   2/22/2010    left total knee arthroplasty     Other:  None  Bleeding tendencies:  None    Relevant Family History:  None    Relevant Social History:  None    A relevant Review of Systems was performed and was negative. .    ALLERGIES/SENSITIVITIES:   Allergies   Allergen Reactions     Erythromycin Itching  "    Sulfa Drugs Visual Disturbance     Affected eyesight        CURRENT MEDICATIONS:     Medications Prior to Admission   Medication Sig Dispense Refill Last Dose     amLODIPine (NORVASC) 5 MG tablet Take 1 tablet (5 mg) by mouth daily 90 tablet 1 1/10/2023     atorvastatin (LIPITOR) 40 MG tablet Take 1 tablet (40 mg) by mouth daily 90 tablet 4 1/9/2023 at 2000     Cholecalciferol (VITAMIN D3) 25 MCG (1000 UT) CAPS Take 1 capsule by mouth daily   1/9/2023 at 2000     FIBER ADULT GUMMIES PO Take 2 chew tab by mouth daily   Past Week     l-lysine HCl 500 MG TABS tablet Take 500 mg by mouth daily   Past Week     Multiple Vitamins-Minerals (HAIR SKIN AND NAILS FORMULA PO) Take 2 chew tab by mouth   1/6/2023 at 0800     Multiple Vitamins-Minerals (MACULAR VITAMIN BENEFIT PO) Take 1 tablet by mouth daily Focus MaculaPro   1/9/2023 at 2000     valACYclovir (VALTREX) 1000 mg tablet 2 tabs at onset of cold sores. Repeat dose after 12 hours. 12 tablet 4 Unknown           LABORATORY:   CMP Results: No lab results found in last 7 days.   CBCNo lab results found in last 7 days.  INRNo lab results found in last 7 days.       I reviewed the patient's pertinent imaging test results. None      RELEVANT EXAM:     BP (!) 174/81   Pulse 66   Temp 97.2  F (36.2  C) (Temporal)   Resp 16   Ht 1.607 m (5' 3.25\")   Wt 73.9 kg (163 lb)   SpO2 98%   BMI 28.65 kg/m     Lung Exam:   Normal  Heart Exam:  Normal  Airway Exam:  Normal  Mental Status:  Patient understands indications, risks and benefits and wishes to proceed with the upper endoscopy and colonoscopy. .  Mallampati:I  Previous reaction to anesthesia/sedation:   None  Sedation plan based on assessment:  Moderate  Comment(s):  None    Consent signed by:  The patient.     ASA Classification:  2    IMPRESSION:      Dysphagia, Vomiting, GERD ,Screening for colon cancer    PLAN:      EGD and Colonoscopy.     Sachin Tan DO  [facility]  [phone]    "

## 2023-01-11 LAB
PATH REPORT.COMMENTS IMP SPEC: NORMAL
PATH REPORT.COMMENTS IMP SPEC: NORMAL
PATH REPORT.FINAL DX SPEC: NORMAL
PATH REPORT.GROSS SPEC: NORMAL
PATH REPORT.MICROSCOPIC SPEC OTHER STN: NORMAL
PATH REPORT.RELEVANT HX SPEC: NORMAL
PHOTO IMAGE: NORMAL

## 2023-01-11 PROCEDURE — 88342 IMHCHEM/IMCYTCHM 1ST ANTB: CPT | Mod: 26 | Performed by: PATHOLOGY

## 2023-01-11 PROCEDURE — 88305 TISSUE EXAM BY PATHOLOGIST: CPT | Mod: 26 | Performed by: PATHOLOGY

## 2023-01-12 ENCOUNTER — HOSPITAL ENCOUNTER (OUTPATIENT)
Dept: PHYSICAL THERAPY | Facility: CLINIC | Age: 83
Setting detail: THERAPIES SERIES
Discharge: HOME | End: 2023-01-12
Attending: PEDIATRICS
Payer: COMMERCIAL

## 2023-01-12 PROCEDURE — 97110 THERAPEUTIC EXERCISES: CPT | Mod: GP

## 2023-01-26 ENCOUNTER — HOSPITAL ENCOUNTER (OUTPATIENT)
Dept: PHYSICAL THERAPY | Facility: CLINIC | Age: 83
Setting detail: THERAPIES SERIES
Discharge: HOME | End: 2023-01-26
Attending: PEDIATRICS
Payer: COMMERCIAL

## 2023-01-26 PROCEDURE — 97140 MANUAL THERAPY 1/> REGIONS: CPT | Mod: GP

## 2023-03-01 NOTE — ED TRIAGE NOTES
covid + and worried about a blood clot.  Pain right side.  More soa.  
Detail Level: Zone
General Sunscreen Counseling: I recommended a broad spectrum sunscreen with a SPF of 30 or higher.  I explained that SPF 30 sunscreens block approximately 97 percent of the sun's harmful rays.  Sunscreens should be applied at least 15 minutes prior to expected sun exposure and then every 2 hours after that as long as sun exposure continues. If swimming or exercising sunscreen should be reapplied every 45 minutes to an hour after getting wet or sweating.  One ounce, or the equivalent of a shot glass full of sunscreen, is adequate to protect the skin not covered by a bathing suit. I also recommended a lip balm with a sunscreen as well. Sun protective clothing can be used in lieu of sunscreen but must be worn the entire time you are exposed to the sun's rays.

## 2023-03-07 ENCOUNTER — OFFICE VISIT (OUTPATIENT)
Dept: DERMATOLOGY | Facility: CLINIC | Age: 83
End: 2023-03-07
Payer: COMMERCIAL

## 2023-03-07 DIAGNOSIS — D23.9 DERMAL NEVUS: ICD-10-CM

## 2023-03-07 DIAGNOSIS — L82.1 SEBORRHEIC KERATOSIS: Primary | ICD-10-CM

## 2023-03-07 DIAGNOSIS — L82.0 INFLAMED SEBORRHEIC KERATOSIS: ICD-10-CM

## 2023-03-07 DIAGNOSIS — L81.4 LENTIGO: ICD-10-CM

## 2023-03-07 PROCEDURE — 17111 DESTRUCTION B9 LESIONS 15/>: CPT | Performed by: DERMATOLOGY

## 2023-03-07 PROCEDURE — 99213 OFFICE O/P EST LOW 20 MIN: CPT | Mod: 25 | Performed by: DERMATOLOGY

## 2023-03-07 NOTE — PROGRESS NOTES
Marlene Millan is an extremely pleasant 82 year old year old female patient here today for two itching spots on neck.   .   Patient states this has been present for a while.  Patient reports the following symptoms:  itching.  Patient reports the following previous treatments none.  These treatments did not work.  Patient reports the following modifying factors none.  Associated symptoms: none.  Patient has no other skin complaints today.  Remainder of the HPI, Meds, PMH, Allergies, FH, and SH was reviewed in chart.      Past Medical History:   Diagnosis Date     Basal cell carcinoma      Hypertension 06/07/2010     Macular degeneration      PONV (postoperative nausea and vomiting)      Pulmonary embolism (H)      Pulmonary embolism, bilateral (H) 02/19/2012    Post-surgical at [facility] following total knee replacement      Shingles outbreak 12/2016       Past Surgical History:   Procedure Laterality Date     basal cell cancer of nose  Oct 2012     BREAST SURGERY      breast reduction     CARPAL TUNNEL RELEASE RT/LT      right     COLONOSCOPY N/A 1/10/2023    Procedure: COLONOSCOPY with cold polypectomies;  Surgeon: Sachin Tan DO;  Location: [facility] OR     COMBINED REPAIR PTOSIS WITH BLEPHAROPLASTY BILATERAL Bilateral 6/30/2015    Procedure: COMBINED REPAIR PTOSIS WITH BLEPHAROPLASTY BILATERAL;  Surgeon: Ilir Marvin MD;  Location: [facility]     ESOPHAGOSCOPY, GASTROSCOPY, DUODENOSCOPY (EGD), COMBINED N/A 1/10/2023    Procedure: ESOPHAGOGASTRODUODENOSCOPY WITH biopsies and 45 [place] Savary Dilation;  Surgeon: Sachin Tan DO;  Location: [facility] OR     HC REMOVAL GALLBLADDER       LIGATE VEIN VARICOSE, PHLEBECTOMY MULTIPLE STAB, COMBINED Bilateral 8/13/2015    Procedure: COMBINED LIGATE VEIN VARICOSE, PHLEBECTOMY MULTIPLE STAB;  Surgeon: Alphonse Pro MD;  Location: [facility]     ORTHOPEDIC SURGERY      bilateral knee     PHACOEMULSIFICATION WITH STANDARD INTRAOCULAR LENS  IMPLANT Right 2017    Procedure: PHACOEMULSIFICATION WITH STANDARD INTRAOCULAR LENS IMPLANT;  Right Cataract Removal with Implant;  Surgeon: Clif Michel MD;  Location: [facility]     SURGICAL HISTORY OF -       breast reduction mammoplasty      SURGICAL HISTORY OF -       bilateral lower extremity vein stripping     SURGICAL HISTORY OF -   2010    left total knee arthroplasty        Family History   Problem Relation Age of Onset     Diabetes Father         last both legs to the disease     Heart Disease Father          age 91       Social History     Socioeconomic History     Marital status:      Spouse name: Not on file     Number of children: Not on file     Years of education: Not on file     Highest education level: Not on file   Occupational History     Not on file   Tobacco Use     Smoking status: Former     Years: 20.00     Types: Cigarettes     Quit date: 1982     Years since quittin.0     Smokeless tobacco: Never   Vaping Use     Vaping Use: Never used   Substance and Sexual Activity     Alcohol use: Yes     Comment: occ wine     Drug use: No     Sexual activity: Not Currently   Other Topics Concern     Parent/sibling w/ CABG, MI or angioplasty before 65F 55M? No   Social History Narrative     Not on file     Social Determinants of Health     Financial Resource Strain: Not on file   Food Insecurity: Not on file   Transportation Needs: Not on file   Physical Activity: Not on file   Stress: Not on file   Social Connections: Not on file   Intimate Partner Violence: Not on file   Housing Stability: Not on file       Outpatient Encounter Medications as of 3/7/2023   Medication Sig Dispense Refill     amLODIPine (NORVASC) 5 MG tablet Take 1 tablet (5 mg) by mouth daily 90 tablet 1     atorvastatin (LIPITOR) 40 MG tablet Take 1 tablet (40 mg) by mouth daily 90 tablet 4     Cholecalciferol (VITAMIN D3) 25 MCG (1000 UT) CAPS Take 1 capsule by mouth daily       FIBER ADULT  GUMMIES PO Take 2 chew tab by mouth daily       l-lysine HCl 500 MG TABS tablet Take 500 mg by mouth daily       Multiple Vitamins-Minerals (HAIR SKIN AND NAILS FORMULA PO) Take 2 chew tab by mouth       Multiple Vitamins-Minerals (MACULAR VITAMIN BENEFIT PO) Take 1 tablet by mouth daily Focus MaculaPro       valACYclovir (VALTREX) 1000 mg tablet 2 tabs at onset of cold sores. Repeat dose after 12 hours. 12 tablet 4     No facility-administered encounter medications on file as of 3/7/2023.             O:   NAD, WDWN, Alert & Oriented, Mood & Affect wnl, Vitals stable   Here today alone   General appearance normal   Vitals stable   Alert, oriented and in no acute distress     Inflamed seborrheic keratosis on neck and jawline   Stuck on papules and brown macules on trunk and ext   Flesh colored papules on face      Eyes: Conjunctivae/lids:Normal     ENT: Lips, buccal mucosa, tongue: normal    MSK:Normal    Cardiovascular: peripheral edema none    Pulm: Breathing Normal    Neuro/Psych: Orientation:Alert and Orientedx3 ; Mood/Affect:normal       A/P:  1. Seborrheic keratosis, lentigo, dermal nevus  2. Inflamed seborrheic keratosis   Neck x10  jawline x6  LN2:  Treated with LN2 for 5s for 1-2 cycles. Warned risks of blistering, pain, pigment change, scarring, and incomplete resolution.  Advised patient to return if lesions do not completely resolve.  Wound care sheet given.  It was a pleasure speaking to Marlene Millan today.  Previous clinic notes and pertinent laboratory tests were reviewed prior to Marlene Millan's visit.  Nature of benign skin lesions dicussed with patient.  Return to clinic prn

## 2023-03-07 NOTE — LETTER
3/7/2023         RE: Marlene Millan  [address] [facility] [place] [address]        Dear Colleague,    Thank you for referring your patient, Marlene Millan, to the [facility]. Please see a copy of my visit note below.    Marlene Millan is an extremely pleasant 82 year old year old female patient here today for two itching spots on neck.   .   Patient states this has been present for a while.  Patient reports the following symptoms:  itching.  Patient reports the following previous treatments none.  These treatments did not work.  Patient reports the following modifying factors none.  Associated symptoms: none.  Patient has no other skin complaints today.  Remainder of the HPI, Meds, PMH, Allergies, FH, and SH was reviewed in chart.      Past Medical History:   Diagnosis Date     Basal cell carcinoma      Hypertension 06/07/2010     Macular degeneration      PONV (postoperative nausea and vomiting)      Pulmonary embolism (H)      Pulmonary embolism, bilateral (H) 02/19/2012    Post-surgical at [facility] following total knee replacement      Shingles outbreak 12/2016       Past Surgical History:   Procedure Laterality Date     basal cell cancer of nose  Oct 2012     BREAST SURGERY      breast reduction     CARPAL TUNNEL RELEASE RT/LT      right     COLONOSCOPY N/A 1/10/2023    Procedure: COLONOSCOPY with cold polypectomies;  Surgeon: Sachin Tan DO;  Location: [facility] OR     COMBINED REPAIR PTOSIS WITH BLEPHAROPLASTY BILATERAL Bilateral 6/30/2015    Procedure: COMBINED REPAIR PTOSIS WITH BLEPHAROPLASTY BILATERAL;  Surgeon: Ilir Marvin MD;  Location: [facility]     ESOPHAGOSCOPY, GASTROSCOPY, DUODENOSCOPY (EGD), COMBINED N/A 1/10/2023    Procedure: ESOPHAGOGASTRODUODENOSCOPY WITH biopsies and 45 [place]ambican Savary Dilation;  Surgeon: Sachin Tan DO;  Location: [facility] OR     HC REMOVAL GALLBLADDER       LIGATE VEIN VARICOSE, PHLEBECTOMY MULTIPLE STAB,  COMBINED Bilateral 2015    Procedure: COMBINED LIGATE VEIN VARICOSE, PHLEBECTOMY MULTIPLE STAB;  Surgeon: Alphonse Pro MD;  Location: [facility]     ORTHOPEDIC SURGERY      bilateral knee     PHACOEMULSIFICATION WITH STANDARD INTRAOCULAR LENS IMPLANT Right 2017    Procedure: PHACOEMULSIFICATION WITH STANDARD INTRAOCULAR LENS IMPLANT;  Right Cataract Removal with Implant;  Surgeon: Clif Michel MD;  Location: [facility] OR     SURGICAL HISTORY OF -       breast reduction mammoplasty      SURGICAL HISTORY OF -       bilateral lower extremity vein stripping     SURGICAL HISTORY OF -   2010    left total knee arthroplasty        Family History   Problem Relation Age of Onset     Diabetes Father         last both legs to the disease     Heart Disease Father          age 91       Social History     Socioeconomic History     Marital status:      Spouse name: Not on file     Number of children: Not on file     Years of education: Not on file     Highest education level: Not on file   Occupational History     Not on file   Tobacco Use     Smoking status: Former     Years: 20.00     Types: Cigarettes     Quit date: 1982     Years since quittin.0     Smokeless tobacco: Never   Vaping Use     Vaping Use: Never used   Substance and Sexual Activity     Alcohol use: Yes     Comment: occ wine     Drug use: No     Sexual activity: Not Currently   Other Topics Concern     Parent/sibling w/ CABG, MI or angioplasty before 65F 55M? No   Social History Narrative     Not on file     Social Determinants of Health     Financial Resource Strain: Not on file   Food Insecurity: Not on file   Transportation Needs: Not on file   Physical Activity: Not on file   Stress: Not on file   Social Connections: Not on file   Intimate Partner Violence: Not on file   Housing Stability: Not on file       Outpatient Encounter Medications as of 3/7/2023   Medication Sig Dispense Refill     amLODIPine (NORVASC) 5  MG tablet Take 1 tablet (5 mg) by mouth daily 90 tablet 1     atorvastatin (LIPITOR) 40 MG tablet Take 1 tablet (40 mg) by mouth daily 90 tablet 4     Cholecalciferol (VITAMIN D3) 25 MCG (1000 UT) CAPS Take 1 capsule by mouth daily       FIBER ADULT GUMMIES PO Take 2 chew tab by mouth daily       l-lysine HCl 500 MG TABS tablet Take 500 mg by mouth daily       Multiple Vitamins-Minerals (HAIR SKIN AND NAILS FORMULA PO) Take 2 chew tab by mouth       Multiple Vitamins-Minerals (MACULAR VITAMIN BENEFIT PO) Take 1 tablet by mouth daily Focus MaculaPro       valACYclovir (VALTREX) 1000 mg tablet 2 tabs at onset of cold sores. Repeat dose after 12 hours. 12 tablet 4     No facility-administered encounter medications on file as of 3/7/2023.             O:   NAD, WDWN, Alert & Oriented, Mood & Affect wnl, Vitals stable   Here today alone   General appearance normal   Vitals stable   Alert, oriented and in no acute distress     Inflamed seborrheic keratosis on neck and jawline   Stuck on papules and brown macules on trunk and ext   Flesh colored papules on face      Eyes: Conjunctivae/lids:Normal     ENT: Lips, buccal mucosa, tongue: normal    MSK:Normal    Cardiovascular: peripheral edema none    Pulm: Breathing Normal    Neuro/Psych: Orientation:Alert and Orientedx3 ; Mood/Affect:normal       A/P:  1. Seborrheic keratosis, lentigo, dermal nevus  2. Inflamed seborrheic keratosis   Neck x10  jawline x6  LN2:  Treated with LN2 for 5s for 1-2 cycles. Warned risks of blistering, pain, pigment change, scarring, and incomplete resolution.  Advised patient to return if lesions do not completely resolve.  Wound care sheet given.  It was a pleasure speaking to Marlene Millan today.  Previous clinic notes and pertinent laboratory tests were reviewed prior to Marlene Millan's visit.  Nature of benign skin lesions dicussed with patient.  Return to clinic prn      Again, thank you for allowing me to participate in the care of your  patient.        Sincerely,        Errol Anderson MD

## 2023-03-27 ENCOUNTER — VIRTUAL VISIT (OUTPATIENT)
Dept: INTERNAL MEDICINE | Facility: CLINIC | Age: 83
End: 2023-03-27
Payer: COMMERCIAL

## 2023-03-27 ENCOUNTER — LAB (OUTPATIENT)
Dept: LAB | Facility: CLINIC | Age: 83
End: 2023-03-27
Attending: INTERNAL MEDICINE
Payer: COMMERCIAL

## 2023-03-27 DIAGNOSIS — J02.9 PHARYNGITIS, UNSPECIFIED ETIOLOGY: ICD-10-CM

## 2023-03-27 DIAGNOSIS — J06.9 UPPER RESPIRATORY TRACT INFECTION, UNSPECIFIED TYPE: ICD-10-CM

## 2023-03-27 DIAGNOSIS — J02.9 PHARYNGITIS, UNSPECIFIED ETIOLOGY: Primary | ICD-10-CM

## 2023-03-27 DIAGNOSIS — U07.1 INFECTION DUE TO 2019 NOVEL CORONAVIRUS: ICD-10-CM

## 2023-03-27 LAB
DEPRECATED S PYO AG THROAT QL EIA: NEGATIVE
FLUAV AG SPEC QL IA: NEGATIVE
FLUBV AG SPEC QL IA: NEGATIVE
GROUP A STREP BY PCR: NOT DETECTED

## 2023-03-27 PROCEDURE — 87651 STREP A DNA AMP PROBE: CPT

## 2023-03-27 PROCEDURE — 99442 PR PHYSICIAN TELEPHONE EVALUATION 11-20 MIN: CPT | Mod: 95 | Performed by: INTERNAL MEDICINE

## 2023-03-27 PROCEDURE — 87804 INFLUENZA ASSAY W/OPTIC: CPT

## 2023-03-27 PROCEDURE — U0005 INFEC AGEN DETEC AMPLI PROBE: HCPCS

## 2023-03-27 PROCEDURE — U0003 INFECTIOUS AGENT DETECTION BY NUCLEIC ACID (DNA OR RNA); SEVERE ACUTE RESPIRATORY SYNDROME CORONAVIRUS 2 (SARS-COV-2) (CORONAVIRUS DISEASE [COVID-19]), AMPLIFIED PROBE TECHNIQUE, MAKING USE OF HIGH THROUGHPUT TECHNOLOGIES AS DESCRIBED BY CMS-2020-01-R: HCPCS

## 2023-03-27 NOTE — PROGRESS NOTES
"Janee is a 82 year old who is being evaluated via a billable telephone visit.      What phone number would you like to be contacted at? [phone]    Distant Location (provider location):  On-site    Assessment & Plan     Pharyngitis, unspecified etiology  Discussed with patient for reassurance would suggest screening for strep based on exposure despite antibiotic therapy per granddaughter.  - Streptococcus A Rapid Screen w/Reflex to PCR - Clinic Collect; Future    Upper respiratory tract infection, unspecified type  Suggest routine screening for COVID and influenza and discuss treatment options pending results  - Symptomatic COVID-19 Virus (Coronavirus) by PCR Nasopharyngeal; Future  - Influenza A & B Antigen - Clinic Collect; Future    Discussed the results of the COVID test and recommended follow-up with the Paxlovid.  Discussed with patient issues of her prior hypercoagulable state.  Discussed also issues with the patient's hematologist who saw her and she has agreed the Paxlovid would be appropriate therapy.  Discussed holding Lipitor and reducing dose of amlodipine by 50% for duration of therapy +5 days.     BMI:   Estimated body mass index is 28.65 kg/m  as calculated from the following:    Height as of 1/10/23: 1.607 m (5' 3.25\").    Weight as of 1/10/23: 73.9 kg (163 lb).       See Patient Instructions    Des Gaines MD  [facility]    Subjective   Janee is a 82 year old, presenting for the following health issues:  Pharyngitis  No flowsheet data found.  HPI     Patient has never seen me prior and and is followed by the family practice clinic.  Patient states that she was unable to get into see her primary provider.    Reports being exposed to her granddaughter who had strep but according to her daughter the granddaughter had been on antibiotics for at least 3 days prior to the patient seeing her.  Nonetheless, the patient has developed the below mentioned symptoms.  " She reports to me that she has been immunized against COVID as well as influenza.    Acute Illness  Acute illness concerns: Sore throat   Onset/Duration: Yesterday   Symptoms:  Fever: No  Chills/Sweats: No  Headache (location?): No  Sinus Pressure: No  Conjunctivitis:  No  Ear Pain: no  Rhinorrhea: YES  Congestion: YES  Sore Throat: YES  Cough: YES-non-productive  Wheeze: No  Decreased Appetite: YES  Nausea: No  Vomiting: No  Diarrhea: No  Dysuria/Freq.: No  Dysuria or Hematuria: No  Fatigue/Achiness: YES  Sick/Strep Exposure: YES- granddaughter on Tuesday who had strep    Therapies tried and outcome: Warm fluids         Review of Systems   CONSTITUTIONAL: NEGATIVE for fever, chills, change in weight  EYES: NEGATIVE for vision changes or irritation  CV: NEGATIVE for chest pain, palpitations or peripheral edema  GI: NEGATIVE for nausea, abdominal pain, heartburn, or change in bowel habits  : NEGATIVE for frequency, dysuria, or hematuria  MUSCULOSKELETAL: NEGATIVE for significant arthralgias or myalgia  NEURO: NEGATIVE for weakness, dizziness or paresthesias  HEME: NEGATIVE for bleeding problems  PSYCHIATRIC: NEGATIVE for changes in mood or affect      Objective         Vitals:  No vitals were obtained today due to virtual visit.    Physical Exam   alert and no distress  PSYCH: Alert and oriented times 3; coherent speech, normal   rate and volume, able to articulate logical thoughts, able   to abstract reason, no tangential thoughts, no hallucinations   or delusions  Her affect is normal  RESP: Mild cough, no audible wheezing, able to talk in full sentences  Remainder of exam unable to be completed due to telephone visits        Creatinine   Date Value Ref Range Status   10/21/2022 0.85 0.51 - 0.95 mg/dL Final   10/21/2020 0.91 0.52 - 1.04 mg/dL Final         Phone call duration: 15 minutes

## 2023-03-28 ENCOUNTER — TELEPHONE (OUTPATIENT)
Dept: NURSING | Facility: CLINIC | Age: 83
End: 2023-03-28
Payer: COMMERCIAL

## 2023-03-28 LAB — SARS-COV-2 RNA RESP QL NAA+PROBE: POSITIVE

## 2023-03-28 NOTE — TELEPHONE ENCOUNTER
Coronavirus (COVID-19) Notification    Caller Name (Patient, parent, daughter/son, grandparent, etc)  Patient    Reason for call  Notify of Positive Coronavirus (COVID-19) lab results, assess symptoms,  review [facility] recommendations    Lab Result    Lab test:  2019-nCoV rRt-PCR or SARS-CoV-2 PCR    Oropharyngeal AND/OR nasopharyngeal swabs is POSITIVE for 2019-nCoV RNA/SARS-COV-2 PCR (COVID-19 virus)      Gather patient reported symptoms   Assessment   Current Symptoms at time of phone call, reported by patient: (if no symptoms, document: No symptoms]    Date of symptom(s) onset (if applicable)      If at time of call, Patients symptoms have worsened, the Patient should contact 911 or have someone drive them to Emergency Dept promptly:      If Patient calling 911, inform 911 personal that you have tested positive for the Coronavirus (COVID-19).  Place mask on and await 911 to arrive.    If Emergency Dept, If possible, please have another adult drive you to the Emergency Dept but you need to wear mask when in contact with other people.      Treatment Options:   Is patient interested in discussing COVID treatment? No.   Already been in contact with a [facility] provider.      Review information with Patient    Your result was positive. This means you have COVID-19 (coronavirus).    How can I protect others?    These guidelines are for isolating before returning to work, school or .    If you DO have symptoms    Stay home and away from others     For at least 5 days after your symptoms started, AND    You are fever free for 24 hours (with no medicine that reduces fever), AND    Your other symptoms are better    Wear a mask for 10 full days anytime you are around others    If you DON'T have symptoms    Stay home and away from others for at least 5 days after your positive test    Wear a mask for 10 full days anytime you are around others    There may be different guidelines for healthcare facilities.  Please  check with the specific sites before arriving.    If you have been told by a doctor that you were severely ill with COVID-19 or are immunocompromised, you should isolate for at least 10 days.    You should not go back to work until you meet the guidelines above for ending your home isolation. You don't need to be retested for COVID-19 before going back to work--studies show that you won't spread the virus if it's been at least 10 days since your symptoms started (or 20 days, if you have a weak immune system).    Employers, schools, and daycares: This is an official notice for this person's medical guidelines for returning in-person.  They must meet the above guidelines before going back to work, school or  in person.    You will receive a positive COVID-19 letter via [employer] or the mail soon with additional self-care information.    Would you like me to review some of that information with you now?  No    If you were tested for an upcoming procedure, please contact your provider for next steps.    Ivy Lopez

## 2023-04-20 ENCOUNTER — PATIENT OUTREACH (OUTPATIENT)
Dept: CARE COORDINATION | Facility: CLINIC | Age: 83
End: 2023-04-20
Payer: COMMERCIAL

## 2023-04-24 ENCOUNTER — TRANSFERRED RECORDS (OUTPATIENT)
Dept: HEALTH INFORMATION MANAGEMENT | Facility: CLINIC | Age: 83
End: 2023-04-24
Payer: COMMERCIAL

## 2023-05-09 ENCOUNTER — OFFICE VISIT (OUTPATIENT)
Dept: DERMATOLOGY | Facility: CLINIC | Age: 83
End: 2023-05-09
Payer: COMMERCIAL

## 2023-05-09 DIAGNOSIS — Z85.828 HISTORY OF SKIN CANCER: Primary | ICD-10-CM

## 2023-05-09 DIAGNOSIS — D23.9 DERMAL NEVUS: ICD-10-CM

## 2023-05-09 DIAGNOSIS — L82.1 SEBORRHEIC KERATOSIS: ICD-10-CM

## 2023-05-09 DIAGNOSIS — D18.01 ANGIOMA OF SKIN: ICD-10-CM

## 2023-05-09 DIAGNOSIS — L82.0 INFLAMED SEBORRHEIC KERATOSIS: ICD-10-CM

## 2023-05-09 DIAGNOSIS — L81.4 LENTIGO: ICD-10-CM

## 2023-05-09 PROCEDURE — 17110 DESTRUCTION B9 LES UP TO 14: CPT | Performed by: DERMATOLOGY

## 2023-05-09 PROCEDURE — 99213 OFFICE O/P EST LOW 20 MIN: CPT | Mod: 25 | Performed by: DERMATOLOGY

## 2023-05-09 NOTE — LETTER
5/9/2023         RE: Marlene Millan  [address] [address] [address]        Dear Colleague,    Thank you for referring your patient, Marlene Millan, to the [facility]. Please see a copy of my visit note below.    Marlene Millan is an extremely pleasant 82 year old year old female patient here today for itching spots onneck.  Patient has no other skin complaints today.  Remainder of the HPI, Meds, PMH, Allergies, FH, and SH was reviewed in chart.      Past Medical History:   Diagnosis Date     Basal cell carcinoma      Hypertension 06/07/2010     Macular degeneration      PONV (postoperative nausea and vomiting)      Pulmonary embolism (H)      Pulmonary embolism, bilateral (H) 02/19/2012    Post-surgical at [facility] following total knee replacement      Shingles outbreak 12/2016       Past Surgical History:   Procedure Laterality Date     basal cell cancer of nose  Oct 2012     BREAST SURGERY      breast reduction     CARPAL TUNNEL RELEASE RT/LT      right     COLONOSCOPY N/A 1/10/2023    Procedure: COLONOSCOPY with cold polypectomies;  Surgeon: Sachin Tan DO;  Location: [facility] OR     COMBINED REPAIR PTOSIS WITH BLEPHAROPLASTY BILATERAL Bilateral 6/30/2015    Procedure: COMBINED REPAIR PTOSIS WITH BLEPHAROPLASTY BILATERAL;  Surgeon: Ilir Marvin MD;  Location: [facility]     ESOPHAGOSCOPY, GASTROSCOPY, DUODENOSCOPY (EGD), COMBINED N/A 1/10/2023    Procedure: ESOPHAGOGASTRODUODENOSCOPY WITH biopsies and 45 Congolese Savary Dilation;  Surgeon: Sachin Tan DO;  Location: [facility] OR     HC REMOVAL GALLBLADDER       LIGATE VEIN VARICOSE, PHLEBECTOMY MULTIPLE STAB, COMBINED Bilateral 8/13/2015    Procedure: COMBINED LIGATE VEIN VARICOSE, PHLEBECTOMY MULTIPLE STAB;  Surgeon: Alphonse Pro MD;  Location: [facility] OR     ORTHOPEDIC SURGERY      bilateral knee     PHACOEMULSIFICATION WITH STANDARD INTRAOCULAR LENS IMPLANT Right 12/21/2017     Procedure: PHACOEMULSIFICATION WITH STANDARD INTRAOCULAR LENS IMPLANT;  Right Cataract Removal with Implant;  Surgeon: Clif Michel MD;  Location: [facility] OR     SURGICAL HISTORY OF -       breast reduction mammoplasty      SURGICAL HISTORY OF -       bilateral lower extremity vein stripping     SURGICAL HISTORY OF -   2010    left total knee arthroplasty        Family History   Problem Relation Age of Onset     Diabetes Father         last both legs to the disease     Heart Disease Father          age 91       Social History     Socioeconomic History     Marital status:      Spouse name: Not on file     Number of children: Not on file     Years of education: Not on file     Highest education level: Not on file   Occupational History     Not on file   Tobacco Use     Smoking status: Former     Years: 20.00     Types: Cigarettes     Quit date: 1982     Years since quittin.2     Smokeless tobacco: Never   Vaping Use     Vaping status: Never Used     Passive vaping exposure: Yes   Substance and Sexual Activity     Alcohol use: Yes     Comment: occ wine     Drug use: No     Sexual activity: Not Currently   Other Topics Concern     Parent/sibling w/ CABG, MI or angioplasty before 65F 55M? No   Social History Narrative     Not on file     Social Determinants of Health     Financial Resource Strain: Not on file   Food Insecurity: Not on file   Transportation Needs: Not on file   Physical Activity: Not on file   Stress: Not on file   Social Connections: Not on file   Intimate Partner Violence: Not on file   Housing Stability: Not on file       Outpatient Encounter Medications as of 2023   Medication Sig Dispense Refill     amLODIPine (NORVASC) 5 MG tablet Take 1 tablet (5 mg) by mouth daily 90 tablet 1     atorvastatin (LIPITOR) 40 MG tablet Take 1 tablet (40 mg) by mouth daily 90 tablet 4     Cholecalciferol (VITAMIN D3) 25 MCG (1000 UT) CAPS Take 1 capsule by mouth daily       Multiple  Vitamins-Minerals (HAIR SKIN AND NAILS FORMULA PO) Take 2 chew tab by mouth       Multiple Vitamins-Minerals (MACULAR VITAMIN BENEFIT PO) Take 1 tablet by mouth daily Focus MaculaPro       No facility-administered encounter medications on file as of 5/9/2023.             O:   NAD, WDWN, Alert & Oriented, Mood & Affect wnl, Vitals stable   Here today alone    General appearance normal   Vitals stable   Alert, oriented and in no acute distress     Inflamed seborrheic keratosis on neck and chest   Stuck on papules and brown macules on trunk and ext   Red papules on trunk  Flesh colored papules on trunk         Eyes: Conjunctivae/lids:Normal     ENT: Lips, buccal mucosa, tongue: normal    MSK:Normal    Cardiovascular: peripheral edema none    Pulm: Breathing Normal      Neuro/Psych: Orientation:Alert and Orientedx3 ; Mood/Affect:normal       A/P:  1. Seborrheic keratosis, lentigo, angioma, dermal nevus  2. Inflamed seborrheic keratosis   14 lesions r neck, L neck and R chest   LN2:  Treated with LN2 for 5s for 1-2 cycles. Warned risks of blistering, pain, pigment change, scarring, and incomplete resolution.  Advised patient to return if lesions do not completely resolve.  Wound care sheet given.  It was a pleasure speaking to Marlene Millan today.  Previous clinic notes and pertinent laboratory tests were reviewed prior to Marlene Millan's visit.  Signs and Symptoms of skin cancer discussed with patient.  Patient encouraged to perform monthly skin exams.  UV precautions reviewed with patient.  Return to clinic 12 months        Again, thank you for allowing me to participate in the care of your patient.        Sincerely,        Errol Anderson MD

## 2023-05-09 NOTE — PROGRESS NOTES
Marlene Millan is an extremely pleasant 82 year old year old female patient here today for itching spots onneck.  Patient has no other skin complaints today.  Remainder of the HPI, Meds, PMH, Allergies, FH, and SH was reviewed in chart.      Past Medical History:   Diagnosis Date     Basal cell carcinoma      Hypertension 06/07/2010     Macular degeneration      PONV (postoperative nausea and vomiting)      Pulmonary embolism (H)      Pulmonary embolism, bilateral (H) 02/19/2012    Post-surgical at [facility] following total knee replacement      Shingles outbreak 12/2016       Past Surgical History:   Procedure Laterality Date     basal cell cancer of nose  Oct 2012     BREAST SURGERY      breast reduction     CARPAL TUNNEL RELEASE RT/LT      right     COLONOSCOPY N/A 1/10/2023    Procedure: COLONOSCOPY with cold polypectomies;  Surgeon: Sachin Tan DO;  Location: [facility] OR     COMBINED REPAIR PTOSIS WITH BLEPHAROPLASTY BILATERAL Bilateral 6/30/2015    Procedure: COMBINED REPAIR PTOSIS WITH BLEPHAROPLASTY BILATERAL;  Surgeon: Ilir Marvin MD;  Location: [facility]     ESOPHAGOSCOPY, GASTROSCOPY, DUODENOSCOPY (EGD), COMBINED N/A 1/10/2023    Procedure: ESOPHAGOGASTRODUODENOSCOPY WITH biopsies and 45 Solomon Islander Savary Dilation;  Surgeon: Sachin Tan DO;  Location: [facility] OR     HC REMOVAL GALLBLADDER       LIGATE VEIN VARICOSE, PHLEBECTOMY MULTIPLE STAB, COMBINED Bilateral 8/13/2015    Procedure: COMBINED LIGATE VEIN VARICOSE, PHLEBECTOMY MULTIPLE STAB;  Surgeon: Alphonse Pro MD;  Location: [facility] OR     ORTHOPEDIC SURGERY      bilateral knee     PHACOEMULSIFICATION WITH STANDARD INTRAOCULAR LENS IMPLANT Right 12/21/2017    Procedure: PHACOEMULSIFICATION WITH STANDARD INTRAOCULAR LENS IMPLANT;  Right Cataract Removal with Implant;  Surgeon: Clif Michel MD;  Location: [facility] OR     SURGICAL HISTORY OF -       breast reduction mammoplasty 1990s     SURGICAL HISTORY OF -        bilateral lower extremity vein stripping     SURGICAL HISTORY OF -   2010    left total knee arthroplasty        Family History   Problem Relation Age of Onset     Diabetes Father         last both legs to the disease     Heart Disease Father          age 91       Social History     Socioeconomic History     Marital status:      Spouse name: Not on file     Number of children: Not on file     Years of education: Not on file     Highest education level: Not on file   Occupational History     Not on file   Tobacco Use     Smoking status: Former     Years: 20.00     Types: Cigarettes     Quit date: 1982     Years since quittin.2     Smokeless tobacco: Never   Vaping Use     Vaping status: Never Used     Passive vaping exposure: Yes   Substance and Sexual Activity     Alcohol use: Yes     Comment: occ wine     Drug use: No     Sexual activity: Not Currently   Other Topics Concern     Parent/sibling w/ CABG, MI or angioplasty before 65F 55M? No   Social History Narrative     Not on file     Social Determinants of Health     Financial Resource Strain: Not on file   Food Insecurity: Not on file   Transportation Needs: Not on file   Physical Activity: Not on file   Stress: Not on file   Social Connections: Not on file   Intimate Partner Violence: Not on file   Housing Stability: Not on file       Outpatient Encounter Medications as of 2023   Medication Sig Dispense Refill     amLODIPine (NORVASC) 5 MG tablet Take 1 tablet (5 mg) by mouth daily 90 tablet 1     atorvastatin (LIPITOR) 40 MG tablet Take 1 tablet (40 mg) by mouth daily 90 tablet 4     Cholecalciferol (VITAMIN D3) 25 MCG (1000 UT) CAPS Take 1 capsule by mouth daily       Multiple Vitamins-Minerals (HAIR SKIN AND NAILS FORMULA PO) Take 2 chew tab by mouth       Multiple Vitamins-Minerals (MACULAR VITAMIN BENEFIT PO) Take 1 tablet by mouth daily Focus MaculaPro       No facility-administered encounter medications on file as of  5/9/2023.             O:   NAD, WDWN, Alert & Oriented, Mood & Affect wnl, Vitals stable   Here today alone    General appearance normal   Vitals stable   Alert, oriented and in no acute distress     Inflamed seborrheic keratosis on neck and chest   Stuck on papules and brown macules on trunk and ext   Red papules on trunk  Flesh colored papules on trunk         Eyes: Conjunctivae/lids:Normal     ENT: Lips, buccal mucosa, tongue: normal    MSK:Normal    Cardiovascular: peripheral edema none    Pulm: Breathing Normal      Neuro/Psych: Orientation:Alert and Orientedx3 ; Mood/Affect:normal       A/P:  1. Seborrheic keratosis, lentigo, angioma, dermal nevus  2. Inflamed seborrheic keratosis   14 lesions r neck, L neck and R chest   LN2:  Treated with LN2 for 5s for 1-2 cycles. Warned risks of blistering, pain, pigment change, scarring, and incomplete resolution.  Advised patient to return if lesions do not completely resolve.  Wound care sheet given.  It was a pleasure speaking to Marlene Millan today.  Previous clinic notes and pertinent laboratory tests were reviewed prior to Marlene Millan's visit.  Signs and Symptoms of skin cancer discussed with patient.  Patient encouraged to perform monthly skin exams.  UV precautions reviewed with patient.  Return to clinic 12 months

## 2023-05-16 NOTE — PROGRESS NOTES
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 Pt reports she had to cancel her last appt because her shld hurt too much. She can only do her exs every other day otherwise her shld will hurt.    Objective Measurements:  Objective Measure: Palpation  Details: tender GH biceps, supraspinatus and infraspinatus tendons         Goals:  Goal Identifier 1   Goal Description Pt will be able to sleep through the night without waking with pain.   Target Date 12/15/22   Date Met      Progress (detail required for progress note): 23 Pt takes sleeping aids to sleep     Goal Identifier 2   Goal Description Pt will be able to carry 10# with < 2/10 pain.   Target Date 23   Date Met      Progress (detail required for progress note):       Goal Identifier 3   Goal Description Pt will be able to lift 3# overhead for increased ease with household tasks.   Target Date 23   Date Met      Progress (detail required for progress note):       Goal Identifier 4   Goal Description Pt will be independent with HEP for optimal functional recovery.   Target Date 23   Date Met      Progress (detail required for progress note):                 Plan:  Discharge from therapy.    Discharge:    Reason for Discharge: Patient has failed to schedule further appointments.      Discharge Plan: Patient to continue home program.    Genna Fuentes PT

## 2023-06-01 ENCOUNTER — HEALTH MAINTENANCE LETTER (OUTPATIENT)
Age: 83
End: 2023-06-01

## 2023-06-07 ENCOUNTER — TELEPHONE (OUTPATIENT)
Dept: HEMATOLOGY | Facility: CLINIC | Age: 83
End: 2023-06-07
Payer: COMMERCIAL

## 2023-07-12 ENCOUNTER — TRANSCRIBE ORDERS (OUTPATIENT)
Dept: OTHER | Age: 83
End: 2023-07-12

## 2023-07-12 ENCOUNTER — OFFICE VISIT (OUTPATIENT)
Dept: DERMATOLOGY | Facility: CLINIC | Age: 83
End: 2023-07-12
Payer: COMMERCIAL

## 2023-07-12 ENCOUNTER — THERAPY VISIT (OUTPATIENT)
Dept: PHYSICAL THERAPY | Facility: CLINIC | Age: 83
End: 2023-07-12
Attending: PHYSICIAN ASSISTANT
Payer: COMMERCIAL

## 2023-07-12 DIAGNOSIS — D18.01 ANGIOMA OF SKIN: ICD-10-CM

## 2023-07-12 DIAGNOSIS — L82.0 INFLAMED SEBORRHEIC KERATOSIS: ICD-10-CM

## 2023-07-12 DIAGNOSIS — L81.4 LENTIGO: ICD-10-CM

## 2023-07-12 DIAGNOSIS — D23.9 DERMAL NEVUS: ICD-10-CM

## 2023-07-12 DIAGNOSIS — M25.511 CHRONIC RIGHT SHOULDER PAIN: Primary | ICD-10-CM

## 2023-07-12 DIAGNOSIS — M25.511 RIGHT SHOULDER PAIN: ICD-10-CM

## 2023-07-12 DIAGNOSIS — M25.511 RIGHT SHOULDER PAIN: Primary | ICD-10-CM

## 2023-07-12 DIAGNOSIS — G89.29 CHRONIC RIGHT SHOULDER PAIN: Primary | ICD-10-CM

## 2023-07-12 DIAGNOSIS — L82.1 SEBORRHEIC KERATOSIS: ICD-10-CM

## 2023-07-12 DIAGNOSIS — C44.529 SQUAMOUS CELL CARCINOMA OF SKIN OF CHEST: Primary | ICD-10-CM

## 2023-07-12 PROCEDURE — 99213 OFFICE O/P EST LOW 20 MIN: CPT | Mod: 25 | Performed by: DERMATOLOGY

## 2023-07-12 PROCEDURE — 97110 THERAPEUTIC EXERCISES: CPT | Mod: GP

## 2023-07-12 PROCEDURE — 88331 PATH CONSLTJ SURG 1 BLK 1SPC: CPT | Performed by: DERMATOLOGY

## 2023-07-12 PROCEDURE — 97161 PT EVAL LOW COMPLEX 20 MIN: CPT | Mod: GP

## 2023-07-12 PROCEDURE — 11102 TANGNTL BX SKIN SINGLE LES: CPT | Mod: 59 | Performed by: DERMATOLOGY

## 2023-07-12 PROCEDURE — 17110 DESTRUCTION B9 LES UP TO 14: CPT | Performed by: DERMATOLOGY

## 2023-07-12 ASSESSMENT — PAIN SCALES - GENERAL: PAINLEVEL: NO PAIN (0)

## 2023-07-12 NOTE — PATIENT INSTRUCTIONS
Wound Care Instructions     FOR SUPERFICIAL WOUNDS     [facility] [phone]    [facility] [phone]                       AFTER 24 HOURS YOU SHOULD REMOVE THE BANDAGE AND BEGIN DAILY DRESSING CHANGES AS FOLLOWS:     1) Remove Dressing.     2) Clean and dry the area with tap water using a Q-tip or sterile gauze pad.     3) Apply Vaseline, Aquaphor, Polysporin ointment or Bacitracin ointment over entire wound.  Do NOT use Neosporin ointment.     4) Cover the wound with a band-aid, or a sterile non-stick gauze pad and micropore paper tape      REPEAT THESE INSTRUCTIONS AT LEAST ONCE A DAY UNTIL THE WOUND HAS COMPLETELY HEALED.    It is an old wives tale that a wound heals better when it is exposed to air and allowed to dry out. The wound will heal faster with a better cosmetic result if it is kept moist with ointment and covered with a bandage.    **Do not let the wound dry out.**      Supplies Needed:      *Cotton tipped applicators (Q-tips)    *Polysporin Ointment or Bacitracin Ointment (NOT NEOSPORIN)    *Band-aids or non-stick gauze pads and micropore paper tape.      PATIENT INFORMATION:    During the healing process you will notice a number of changes. All wounds develop a small halo of redness surrounding the wound.  This means healing is occurring. Severe itching with extensive redness usually indicates sensitivity to the ointment or bandage tape used to dress the wound.  You should call our office if this develops.      Swelling  and/or discoloration around your surgical site is common, particularly when performed around the eye.    All wounds normally drain.  The larger the wound the more drainage there will be.  After 7-10 days, you will notice the wound beginning to shrink and new skin will begin to grow.  The wound is healed when you can see skin has formed over the entire area.  A healed wound has a healthy, shiny look to the surface and is red to dark pink in  color to normalize.  Wounds may take approximately 4-6 weeks to heal.  Larger wounds may take 6-8 weeks.  After the wound is healed you may discontinue dressing changes.    You may experience a sensation of tightness as your wound heals. This is normal and will gradually subside.    Your healed wound may be sensitive to temperature changes. This sensitivity improves with time, but if you re having a lot of discomfort, try to avoid temperature extremes.    Patients frequently experience itching after their wound appears to have healed because of the continue healing under the skin.  Plain Vaseline will help relieve the itching.        POSSIBLE COMPLICATIONS    BLEEDING:    Leave the bandage in place.  Use tightly rolled up gauze or a cloth to apply direct pressure over the bandage for 30  minutes.  Reapply pressure for an additional 30 minutes if necessary  Use additional gauze and tape to maintain pressure once the bleeding has stopped. Patient Education       Proper skin care from [facility]:    -Eliminate harsh soaps as they strip the natural oils from the skin, often resulting in dry itchy skin ( i.e. Dial, Zest, Lesley Spring)  -Use mild soaps such as Cetaphil or Dove Sensitive Skin in the shower. You do not need to use soap on arms, legs, and trunk every time you shower unless visibly soiled.   -Avoid hot or cold showers.  -After showering, lightly dry off and apply moisturizing within 2-3 minutes. This will help trap moisture in the skin.   -Aggressive use of a moisturizer at least 1-2 times a day to the entire body (including -Vanicream, Cetaphil, Aquaphor or Cerave) and moisturize hands after every washing.  -We recommend using moisturizers that come in a tub that needs to be scooped out, not a pump. This has more of an oil base. It will hold moisture in your skin much better than a water base moisturizer. The above recommended are non-pore clogging.      Wear a sunscreen with at least SPF 30 on  your face, ears, neck and V of the chest daily. Wear sunscreen on other areas of the body if those areas are exposed to the sun throughout the day. Sunscreens can contain physical and/or chemical blockers. Physical blockers are less likely to clog pores, these include zinc oxide and titanium dioxide. Reapply every two hour and after swimming.     Sunscreen examples: https://www.ewg.org/sunscreen/    UV radiation  UVA radiation remains constant throughout the day and throughout the year. It is a longer wavelength than UVB and therefore penetrates deeper into the skin leading to immediate and delayed tanning, photoaging, and skin cancer. 70-80% of UVA and UVB radiation occurs between the hours of 10am-2pm.  UVB radiation  UVB radiation causes the most harmful effects and is more significant during the summer months. However, snow and ice can reflect UVB radiation leading to skin damage during the winter months as well. UVB radiation is responsible for tanning, burning, inflammation, delayed erythema (pinkness), pigmentation (brown spots), and skin cancer.     I recommend self monthly full body exams and yearly full body exams with a dermatology provider. If you develop a new or changing lesion please follow up for examination. Most skin cancers are pink and scaly or pink and pearly. However, we do see blue/brown/black skin cancers.  Consider the ABCDEs of melanoma when giving yourself your monthly full body exam ( don't forget the groin, buttocks, feet, toes, etc). A-asymmetry, B-borders, C-color, D-diameter, E-elevation or evolving. If you see any of these changes please follow up in clinic. If you cannot see your back I recommend purchasing a hand held mirror to use with a larger wall mirror.       Checking for Skin Cancer  You can find cancer early by checking your skin each month. There are 3 kinds of skin cancer. They are melanoma, basal cell carcinoma, and squamous cell carcinoma. Doing monthly skin checks is  the best way to find new marks or skin changes. Follow the instructions below for checking your skin.   The ABCDEs of checking moles for melanoma   Check your moles or growths for signs of melanoma using ABCDE:   Asymmetry: the sides of the mole or growth don t match  Border: the edges are ragged, notched, or blurred  Color: the color within the mole or growth varies  Diameter: the mole or growth is larger than 6 mm (size of a pencil eraser)  Evolving: the size, shape, or color of the mole or growth is changing (evolving is not shown in the images below)    Checking for other types of skin cancer  Basal cell carcinoma or squamous cell carcinoma have symptoms such as:     A spot or mole that looks different from all other marks on your skin  Changes in how an area feels, such as itching, tenderness, or pain  Changes in the skin's surface, such as oozing, bleeding, or scaliness  A sore that does not heal  New swelling or redness beyond the border of a mole    Who s at risk?  Anyone can get skin cancer. But you are at greater risk if you have:   Fair skin, light-colored hair, or light-colored eyes  Many moles or abnormal moles on your skin  A history of sunburns from sunlight or tanning beds  A family history of skin cancer  A history of exposure to radiation or chemicals  A weakened immune system  If you have had skin cancer in the past, you are at risk for recurring skin cancer.   How to check your skin  Do your monthly skin checkups in front of a full-length mirror. Check all parts of your body, including your:   Head (ears, face, neck, and scalp)  Torso (front, back, and sides)  Arms (tops, undersides, upper, and lower armpits)  Hands (palms, backs, and fingers, including under the nails)  Buttocks and genitals  Legs (front, back, and sides)  Feet (tops, soles, toes, including under the nails, and between toes)  If you have a lot of moles, take digital photos of them each month. Make sure to take photos both up  close and from a distance. These can help you see if any moles change over time.   Most skin changes are not cancer. But if you see any changes in your skin, call your doctor right away. Only he or she can diagnose a problem. If you have skin cancer, seeing your doctor can be the first step toward getting the treatment that could save your life.   [employer] last reviewed this educational content on 4/1/2019 2000-2020 [employer]. [address] [place] [address], [place] [address]. All rights reserved. This information is not intended as a substitute for professional medical care. Always follow your healthcare professional's instructions.       When should I call my doctor?  If you are worsening or not improving, please, contact us or seek urgent care as noted below.     Who should I call with questions (adults)?  [facility] (adult and pediatric): [phone]  [facility] (adult): [phone]  [facility] ([facility] and [place]) [phone]  For urgent needs outside of business hours call the [facility] at [phone] and ask for the dermatology resident on call to be paged  If this is a medical emergency and you are unable to reach an ER, Call [phone]      If you need a prescription refill, please contact your pharmacy. Refills are approved or denied by our Physicians during normal business hours, Monday through Fridays  Per office policy, refills will not be granted if you have not been seen within the past year (or sooner depending on your child's condition)

## 2023-07-12 NOTE — LETTER
7/12/2023         RE: Marlene Millan  [address] [facility] [address]        Dear Colleague,    Thank you for referring your patient, Marlene Millan, to the [facility]. Please see a copy of my visit note below.    Marlene Millan is an extremely pleasant 83 year old year old female patient here today for spot on chest.   .   Patient states this has been present for a while.  Patient reports the following symptoms:  growing.  Patient reports the following previous treatments none.  These treatments did not work.  Patient reports the following modifying factors none.  Associated symptoms: none.  Patient has no other skin complaints today.  Remainder of the HPI, Meds, PMH, Allergies, FH, and SH was reviewed in chart.      Past Medical History:   Diagnosis Date     Basal cell carcinoma      Hypertension 06/07/2010     Macular degeneration      PONV (postoperative nausea and vomiting)      Pulmonary embolism (H)      Pulmonary embolism, bilateral (H) 02/19/2012    Post-surgical at [facility] following total knee replacement      Shingles outbreak 12/2016       Past Surgical History:   Procedure Laterality Date     basal cell cancer of nose  Oct 2012     BREAST SURGERY      breast reduction     CARPAL TUNNEL RELEASE RT/LT      right     COLONOSCOPY N/A 1/10/2023    Procedure: COLONOSCOPY with cold polypectomies;  Surgeon: Sachin Tan DO;  Location: [facility] Main OR     COMBINED REPAIR PTOSIS WITH BLEPHAROPLASTY BILATERAL Bilateral 6/30/2015    Procedure: COMBINED REPAIR PTOSIS WITH BLEPHAROPLASTY BILATERAL;  Surgeon: Ilir Marvin MD;  Location: [facility]     ESOPHAGOSCOPY, GASTROSCOPY, DUODENOSCOPY (EGD), COMBINED N/A 1/10/2023    Procedure: ESOPHAGOGASTRODUODENOSCOPY WITH biopsies and 45 Rwandan Savary Dilation;  Surgeon: Sachin Tan DO;  Location: [facility] Main OR     HC REMOVAL GALLBLADDER       LIGATE VEIN VARICOSE, PHLEBECTOMY MULTIPLE STAB, COMBINED  Bilateral 2015    Procedure: COMBINED LIGATE VEIN VARICOSE, PHLEBECTOMY MULTIPLE STAB;  Surgeon: Alphonse Pro MD;  Location: [facility] OR     ORTHOPEDIC SURGERY      bilateral knee     PHACOEMULSIFICATION WITH STANDARD INTRAOCULAR LENS IMPLANT Right 2017    Procedure: PHACOEMULSIFICATION WITH STANDARD INTRAOCULAR LENS IMPLANT;  Right Cataract Removal with Implant;  Surgeon: Clif Michel MD;  Location: [facility] OR     SURGICAL HISTORY OF -       breast reduction mammoplasty      SURGICAL HISTORY OF -       bilateral lower extremity vein stripping     SURGICAL HISTORY OF -   2010    left total knee arthroplasty        Family History   Problem Relation Age of Onset     Diabetes Father         last both legs to the disease     Heart Disease Father          age 91       Social History     Socioeconomic History     Marital status:      Spouse name: Not on file     Number of children: Not on file     Years of education: Not on file     Highest education level: Not on file   Occupational History     Not on file   Tobacco Use     Smoking status: Former     Years: 20.00     Types: Cigarettes     Quit date: 1982     Years since quittin.4     Smokeless tobacco: Never   Vaping Use     Vaping Use: Never used   Substance and Sexual Activity     Alcohol use: Yes     Comment: occ wine     Drug use: No     Sexual activity: Not Currently   Other Topics Concern     Parent/sibling w/ CABG, MI or angioplasty before 65F 55M? No   Social History Narrative     Not on file     Social Determinants of Health     Financial Resource Strain: Not on file   Food Insecurity: Not on file   Transportation Needs: Not on file   Physical Activity: Not on file   Stress: Not on file   Social Connections: Not on file   Intimate Partner Violence: Not on file   Housing Stability: Not on file       Outpatient Encounter Medications as of 2023   Medication Sig Dispense Refill     amLODIPine (NORVASC) 5 MG  tablet Take 1 tablet (5 mg) by mouth daily 90 tablet 1     atorvastatin (LIPITOR) 40 MG tablet Take 1 tablet (40 mg) by mouth daily 90 tablet 4     Cholecalciferol (VITAMIN D3) 25 MCG (1000 UT) CAPS Take 1 capsule by mouth daily       Multiple Vitamins-Minerals (HAIR SKIN AND NAILS FORMULA PO) Take 2 chew tab by mouth       Multiple Vitamins-Minerals (MACULAR VITAMIN BENEFIT PO) Take 1 tablet by mouth daily Focus MaculaPro       No facility-administered encounter medications on file as of 7/12/2023.             O:   NAD, WDWN, Alert & Oriented, Mood & Affect wnl, Vitals stable   Here today alone   General appearance normal   Vitals stable   Alert, oriented and in no acute distress      Following lymph nodes palpated: Occipital, Cervical, Supraclavicular no lad  Inflamed seborrheic keratosis on neck   R chest 8mm keratotic scaly papule      Stuck on papules and brown macules on trunk and ext   Red papules on trunk  Flesh colored papules on trunk         Eyes: Conjunctivae/lids:Normal     ENT: Lips, buccal mucosa, tongue: normal    MSK:Normal    Cardiovascular: peripheral edema none    Pulm: Breathing Normal    Neuro/Psych: Orientation:Alert and Orientedx3 ; Mood/Affect:normal       MICRO:   R chest:There is a proliferation of irregular nests of abnormal squamous cells arising from the epidermis and invading the dermis. These are well differentiated. The dermis shows a variable superficial perivascular inflammatory infiltrate.   A/P:  1. Seborrheic keratosis, lentigo, angioma, dermal nevus, hx of non-melanoma skin cancer   2. Inflamed seborrheic keratosis neck x10  LN2:  Treated with LN2 for 5s for 1-2 cycles. Warned risks of blistering, pain, pigment change, scarring, and incomplete resolution.  Advised patient to return if lesions do not completely resolve.  Wound care sheet given.  3. R chest r/o squamous cell carcinoma   TANGENTIAL BIOPSY IN HOUSE:  After consent, anesthesia with LEC and prep, tangential excision  performed and dx above confirmed with frozen section histology.  No complications and routine wound care.  Patient is not on  anticoagulants and risk of bleeding discussed with patient.       I have personally reviewed all specimens and/or slides and used them with my medical judgement to determine or confirm the final diagnosis.     Patient told result squamous cell carcinoma schedule excision .      It was a pleasure speaking to Marlene Millan today.  Previous clinic notes and pertinent laboratory tests were reviewed prior to Marlene Millan's visit.  Nature and genetics of benign skin lesions dicussed with patient.  Signs and Symptoms of skin cancer discussed with patient.  Patient encouraged to perform monthly skin exams.  UV precautions reviewed with patient.  Risks of non-melanoma skin cancer discussed with patient   Return to clinic next appt      Again, thank you for allowing me to participate in the care of your patient.        Sincerely,        Errol Anderson MD

## 2023-07-12 NOTE — PROGRESS NOTES
Marlene Millan is an extremely pleasant 83 year old year old female patient here today for spot on chest.   .   Patient states this has been present for a while.  Patient reports the following symptoms:  growing.  Patient reports the following previous treatments none.  These treatments did not work.  Patient reports the following modifying factors none.  Associated symptoms: none.  Patient has no other skin complaints today.  Remainder of the HPI, Meds, PMH, Allergies, FH, and SH was reviewed in chart.      Past Medical History:   Diagnosis Date    Basal cell carcinoma     Hypertension 06/07/2010    Macular degeneration     PONV (postoperative nausea and vomiting)     Pulmonary embolism (H)     Pulmonary embolism, bilateral (H) 02/19/2012    Post-surgical at [facility] following total knee replacement     Shingles outbreak 12/2016       Past Surgical History:   Procedure Laterality Date    basal cell cancer of nose  Oct 2012    BREAST SURGERY      breast reduction    CARPAL TUNNEL RELEASE RT/LT      right    COLONOSCOPY N/A 1/10/2023    Procedure: COLONOSCOPY with cold polypectomies;  Surgeon: Sachin Tan DO;  Location: [facility] Main OR    COMBINED REPAIR PTOSIS WITH BLEPHAROPLASTY BILATERAL Bilateral 6/30/2015    Procedure: COMBINED REPAIR PTOSIS WITH BLEPHAROPLASTY BILATERAL;  Surgeon: Ilir Marvin MD;  Location: [facility]    ESOPHAGOSCOPY, GASTROSCOPY, DUODENOSCOPY (EGD), COMBINED N/A 1/10/2023    Procedure: ESOPHAGOGASTRODUODENOSCOPY WITH biopsies and 45 Danish Savary Dilation;  Surgeon: Sachin Tan DO;  Location: [facility] OR    HC REMOVAL GALLBLADDER      LIGATE VEIN VARICOSE, PHLEBECTOMY MULTIPLE STAB, COMBINED Bilateral 8/13/2015    Procedure: COMBINED LIGATE VEIN VARICOSE, PHLEBECTOMY MULTIPLE STAB;  Surgeon: Alphonse Pro MD;  Location: [facility] OR    ORTHOPEDIC SURGERY      bilateral knee    PHACOEMULSIFICATION WITH STANDARD INTRAOCULAR LENS IMPLANT Right 12/21/2017     Procedure: PHACOEMULSIFICATION WITH STANDARD INTRAOCULAR LENS IMPLANT;  Right Cataract Removal with Implant;  Surgeon: Clif Michel MD;  Location: [facility] OR    SURGICAL HISTORY OF -       breast reduction mammoplasty     SURGICAL HISTORY OF -       bilateral lower extremity vein stripping    SURGICAL HISTORY OF -   2010    left total knee arthroplasty        Family History   Problem Relation Age of Onset    Diabetes Father         last both legs to the disease    Heart Disease Father          age 91       Social History     Socioeconomic History    Marital status:      Spouse name: Not on file    Number of children: Not on file    Years of education: Not on file    Highest education level: Not on file   Occupational History    Not on file   Tobacco Use    Smoking status: Former     Years: 20.00     Types: Cigarettes     Quit date: 1982     Years since quittin.4    Smokeless tobacco: Never   Vaping Use    Vaping Use: Never used   Substance and Sexual Activity    Alcohol use: Yes     Comment: occ wine    Drug use: No    Sexual activity: Not Currently   Other Topics Concern    Parent/sibling w/ CABG, MI or angioplasty before 65F 55M? No   Social History Narrative    Not on file     Social Determinants of Health     Financial Resource Strain: Not on file   Food Insecurity: Not on file   Transportation Needs: Not on file   Physical Activity: Not on file   Stress: Not on file   Social Connections: Not on file   Intimate Partner Violence: Not on file   Housing Stability: Not on file       Outpatient Encounter Medications as of 2023   Medication Sig Dispense Refill    amLODIPine (NORVASC) 5 MG tablet Take 1 tablet (5 mg) by mouth daily 90 tablet 1    atorvastatin (LIPITOR) 40 MG tablet Take 1 tablet (40 mg) by mouth daily 90 tablet 4    Cholecalciferol (VITAMIN D3) 25 MCG (1000 UT) CAPS Take 1 capsule by mouth daily      Multiple Vitamins-Minerals (HAIR SKIN AND NAILS FORMULA PO) Take  2 chew tab by mouth      Multiple Vitamins-Minerals (MACULAR VITAMIN BENEFIT PO) Take 1 tablet by mouth daily Focus MaculaPro       No facility-administered encounter medications on file as of 7/12/2023.             O:   NAD, WDWN, Alert & Oriented, Mood & Affect wnl, Vitals stable   Here today alone   General appearance normal   Vitals stable   Alert, oriented and in no acute distress      Following lymph nodes palpated: Occipital, Cervical, Supraclavicular no lad  Inflamed seborrheic keratosis on neck   R chest 8mm keratotic scaly papule      Stuck on papules and brown macules on trunk and ext   Red papules on trunk  Flesh colored papules on trunk         Eyes: Conjunctivae/lids:Normal     ENT: Lips, buccal mucosa, tongue: normal    MSK:Normal    Cardiovascular: peripheral edema none    Pulm: Breathing Normal    Neuro/Psych: Orientation:Alert and Orientedx3 ; Mood/Affect:normal       MICRO:   R chest:There is a proliferation of irregular nests of abnormal squamous cells arising from the epidermis and invading the dermis. These are well differentiated. The dermis shows a variable superficial perivascular inflammatory infiltrate.   A/P:  1. Seborrheic keratosis, lentigo, angioma, dermal nevus, hx of non-melanoma skin cancer   2. Inflamed seborrheic keratosis neck x10  LN2:  Treated with LN2 for 5s for 1-2 cycles. Warned risks of blistering, pain, pigment change, scarring, and incomplete resolution.  Advised patient to return if lesions do not completely resolve.  Wound care sheet given.  3. R chest r/o squamous cell carcinoma   TANGENTIAL BIOPSY IN HOUSE:  After consent, anesthesia with LEC and prep, tangential excision performed and dx above confirmed with frozen section histology.  No complications and routine wound care.  Patient is not on  anticoagulants and risk of bleeding discussed with patient.       I have personally reviewed all specimens and/or slides and used them with my medical judgement to determine  or confirm the final diagnosis.     Patient told result squamous cell carcinoma schedule excision .      It was a pleasure speaking to Marlene Millan today.  Previous clinic notes and pertinent laboratory tests were reviewed prior to Marlene Millan's visit.  Nature and genetics of benign skin lesions dicussed with patient.  Signs and Symptoms of skin cancer discussed with patient.  Patient encouraged to perform monthly skin exams.  UV precautions reviewed with patient.  Risks of non-melanoma skin cancer discussed with patient   Return to clinic next appt

## 2023-07-12 NOTE — PROGRESS NOTES
PHYSICAL THERAPY EVALUATION  Type of Visit: Evaluation    See electronic medical record for Abuse and Falls Screening details.    Subjective      Presenting condition or subjective complaint: Pt reports injuring her R shld 12/25/21 after falling. She had PT this spring but it was hurting so much she stopped coming. Now it is worse. She can't even lift a coffee mug without pain.  Date of onset: 12/25/21    Relevant medical history: Overweight; Vision problems   Dates & types of surgery: B knees 2013    Prior diagnostic imaging/testing results: MRI 7/25/23 Impression:  1. Full-thickness, full width tear of the entire supraspinatus tendon  and full-thickness tear of the superior infraspinatus tendon with  tendon retraction to the glenohumeral joint line. Supraspinatus and  superior infraspinatus muscle bellies are atrophic, consistent this  chronic tearing.  2. Low-grade intrasubstance tear of the far superior fibers of the  subscapularis tendon, muscle belly preserved.  3. Preserved inferior infraspinatus tendon and teres minor tendon.  4. No significant joint effusion.  5. Attenuated biceps tendon. Significant tearing about the bicipital  labral anchor.   Prior therapy history for the same diagnosis, illness or injury: Yes Spring 2023    Prior Level of Function   Independent    Living Environment  Social support: Alone   Type of home: [facility]   Stairs to enter the home: No       Ramp: No   Stairs inside the home: No       Help at home: Home and Yard maintenance tasks  Equipment owned:       Employment: Yes realtor  Hobbies/Interests: family    Patient goals for therapy: strengthen arm and get some useage out of it    Pain assessment: Pain present  Location: R lat shld/Rating: 10/10 with mvmt, 0/10 at rest     Objective   SHOULDER EVALUATION  PAIN: Pain Level at Rest: 0/10  Pain Level with Use: 10/10  Pain Location: shoulder  Pain Quality: Aching, Gnawing and Sharp  Pain Frequency: intermittent  Pain is Worst: with  activity  Pain is Exacerbated By: lifting arm out to side and overhead, curling hair, stirring, driving, dressing, bathing  Pain is Relieved By: pain patches, cortisone  Pain Progression: Worsened  POSTURE: Sitting Posture: Rounded shoulders, Forward head  ROM:   (Degrees) Left AROM Left PROM Right AROM  Right PROM   Shoulder Flexion    140* ERP   Shoulder Abduction    126*ERP   Shoulder Internal Rotation    90*   Shoulder External Rotation    73* ERP   Pain:     STRENGTH: R UE: did not MMT shld flex and abd due to pain with antigravity mvmt, IR 5/5, ER 4/5 with pain, elbow 5/5,  4/5, L UE: 4/5 shld flex, abd, ER, 5/5 IR, elbow, 4/5   PALPATION: tender R supraspinatus insertion, GH biceps tendon, infraspinatus insertion and mm belly      Assessment & Plan   CLINICAL IMPRESSIONS   Medical Diagnosis: R shoulder pain    Treatment Diagnosis: R shoulder pain due to full thickness supraspinatus tear and tear of sup fibers of infraspinatus.   Impression/Assessment: Patient is a 83 year old female with R shoulder complaints.  The following significant findings have been identified: Pain, Decreased ROM/flexibility and Decreased strength. These impairments interfere with their ability to perform self care tasks, recreational activities, household chores and driving  as compared to previous level of function.     Clinical Decision Making (Complexity):   Clinical Presentation: Stable/Uncomplicated  Clinical Presentation Rationale: based on medical and personal factors listed in PT evaluation  Clinical Decision Making (Complexity): Low complexity    PLAN OF CARE  Treatment Interventions:  Interventions: Manual Therapy, Therapeutic Exercise    Long Term Goals     PT Goal 1  Goal Identifier: 1  Goal Description: Pt will be able to sleep in bed without waking from pain.  Target Date: 08/23/23  PT Goal 2  Goal Identifier: 2  Goal Description: Pt will be able to wash back/hair with < 3/10 pain  Target Date: 09/13/23  PT Goal  3  Goal Identifier: 3  Goal Description: Pt will be able to reach for an object on a high shelf with < 3/10 pain  Target Date: 10/04/23  PT Goal 4  Goal Identifier: 4  Goal Description: Pt will be independent with the HEP for optimal functional recovery.  Target Date: 10/04/23      Frequency of Treatment: 1x/every other week  Duration of Treatment: 12 weeks    Education Assessment:   Learner/Method: Patient;Listening;Demonstration;Pictures/Video;No Barriers to Learning    Risks and benefits of evaluation/treatment have been explained.   Patient/Family/caregiver agrees with Plan of Care.     Evaluation Time:     PT Eval, Low Complexity Minutes (25840): 30    Signing Clinician: NILESH Leger [facility]                                                                                   OUTPATIENT PHYSICAL THERAPY      PLAN OF TREATMENT FOR OUTPATIENT REHABILITATION   Patient's Last Name, First Name, Marlene Quiñones YOB: 1940   Provider's Name   [facility]   Medical Record No.  [id number]     Onset Date: 12/25/21  Start of Care Date: 07/12/23     Medical Diagnosis:  R shoulder pain      PT Treatment Diagnosis:  R shoulder pain due to full thickness supraspinatus tear and tear of sup fibers of infraspinatus. Plan of Treatment  Frequency/Duration: 1x/every other week/ 12 weeks    Certification date from 07/12/23 to 10/04/23         See note for plan of treatment details and functional goals     Genna Fuentes, PT                         I CERTIFY THE NEED FOR THESE SERVICES FURNISHED UNDER        THIS PLAN OF TREATMENT AND WHILE UNDER MY CARE .             Physician Signature               Date    X_____________________________________________________                        Referring Provider:  Lencho Voss      Initial Assessment  See Epic Evaluation- Start of Care Date: 07/12/23

## 2023-07-20 ENCOUNTER — VIRTUAL VISIT (OUTPATIENT)
Dept: HEMATOLOGY | Facility: CLINIC | Age: 83
End: 2023-07-20
Attending: PHYSICIAN ASSISTANT
Payer: COMMERCIAL

## 2023-07-20 DIAGNOSIS — Z86.718 HISTORY OF VENOUS THROMBOEMBOLISM: Primary | ICD-10-CM

## 2023-07-20 DIAGNOSIS — Z71.89 ENCOUNTER FOR ANTICOAGULATION DISCUSSION AND COUNSELING: ICD-10-CM

## 2023-07-20 PROCEDURE — 99213 OFFICE O/P EST LOW 20 MIN: CPT | Mod: VID | Performed by: PHYSICIAN ASSISTANT

## 2023-07-20 NOTE — PATIENT INSTRUCTIONS
[facility]  [facility]  [facility], [place] [address]  Main: [phone], Fax: [phone]    Marlene,  It was a pleasure seeing you today.  Thank you for allowing us to be involved in your care.  Please let us know if there is anything else we can do for you, so that we can be sure you are leaving completely satisfied with your care experience.      For upcoming travel, Take a dose of 5mg of Eliquis 2 hours prior to travel.   Wear compression socks on the flight.   Stay hydrated on the flight and do in flight activity to help with blood flow                               [facility] [phone]    In-Flight Fitness   Don t let cramped conditions put you at risk of DVT.  Keep your body moving even when traveling by airplane.     Seated Exercises:  Ankle Circles: Lift your feet off the floor and twirl your feet as if you re drawing circles with your toes. Continue this for 15 seconds, then reverse direction. Repeat as desired.   Foot Pumps: Keep your heels on the floor and lift the front of your feet toward you as high as possible. Hold for a second or two, then flatten your feet and lift your heels as high as possible, keeping the balls of your feet on the floor. Continue for 30 seconds, and repeat as desired.   Knee Lifts: Keeping your leg bent, lift your knee up to your chest. Bring back to normal position and repeat with your other leg. Repeat 20 to 30 times for each leg.   General Tips:  Try to keep your feet elevated by using the leg rests at the highest elevation. Rest your feet on your carry-on luggage if necessary.   If you have an opportunity to move around the cabin, walk to the restroom and back.   Drink plenty of fluids, preferably water, to avoid dehydration (limit caffeine & alcohol).  Walk for 30 minutes before boarding the plane.   Adapted from [employer]   website at  www.stoptheclot.org        Patient Education & Resources:  For additional information, please see the following web links:  www.stoptheclot.org, www.clotconnect.org.    Call the [facility]  at [phone].     -If surgeries or procedures are planned (for holding instructions).     -If off anticoagulation, please call during high risk times (long-distance travel, broken bones or trauma, immobilization, surgery, pregnancy, or taking estrogen).     -Any new symptoms of DVT (deep vein thrombosis) or PE (pulmonary embolism)    -pain     -swelling     -redness    -warmth    -shortness of breath    -chest pain    -coughing up blood    We would like a provider on our team to see you at least annually for optimal care and to allow us to continue to prescribe for you.     Return to clinic in 1 year or sooner if any surgeries planned.     Your nurse clinician is Nataliia Shirley, MSN, RN, [phone].   If they are unavailable and you have immediate concerns, please call [phone] and ask for a nurse.         Maddi Brown, MPH, PA-C  [facility]

## 2023-07-20 NOTE — PROGRESS NOTES
[facility]  [address] [place] [address] [address], [place] [address]  Main: [phone], Fax: [phone]    Patient seen at: [facility] at [address] [place] [address]    Outpatient Visit Note:    Patient: Marlene Millan  MRN: [id number]  : 1940  [place]: 2023    Reason for visit:  Annual follow up, history of venous thromboembolism     Clinical History Summary:  Marlene Millan is a 83 year old female with past medical history significant for  hypertension, dyslipidemia, coronary calcification, PFO, venous incompetency and recurrent pulmonary emboli. Her first DVT/PE was in  post surgically after left total knee replacement. She was treated with warfarin for three months. She had side effects from warfarin per her report. She then did well until 2022 when she returned from a trip to [place] by air (3 hours) and developed pleuritic chest and back pain with dyspnea. She was found to have pulmonary embolism. US of lower extremities was negative. She did not have any imaging of her upper extremities but reported pain and swelling of the right arm. She did complete three months of Eliquis therapy and was taken off. She flies frequently as her son is a .    Her last episode was considered to be minimally provoked >3 months out from Covid 19 infection and with air travel <3 hours in duration. We discussed options for ongoing long term anticoagulation with prophylactic intensity Eliquis 2.5mg twice daily vs prophylactic intensity anticoagulation during high risk time periods including travel >2-3 hours, immobilization or hospitalization>48 hours, and post surgically.  I did  the patient on the low but theoretical risk of paradoxical emboli with her PFO favoring ongoing anticoagulation. She has opted for anticoagulation only during high risk time periods for now.    She was recommended to have annual follow up.     Interim  History:  Today, Janee notes she is doing fairly well other than her right shoulder. She unfortunately had a slip and fall on ice this winter and suffered a right rotator cuff injury. She did not have any fractures and no head injury. She is participating in physical therapy at present. Still having pain and issues completing her ADLs. Pain is causing difficult with sleeping. She is going for second opinion on surgery. She denies any other falls.     She had a colonoscopy and EGD with esophageal dilation in January. She was diagnosed with thrush. Plans to have repeat endoscopies in 5 years unless symptoms arise sooner.     She denies any acute bleeding issues. She has not taken Eliquis but does have some at home. Denies any chest pain or shortness of breath. She denies any other leg pain or swelling other than related to her fall. When she sits too long her feet goes numb but this is felt to be related to sciatic nerve.     She is planning on a cruise in the coming months. She is flying from [facility] to [facility] and then cruising back to [place] and flying home from there. Her son works for [employer].     ROS:  Denies any bleeding complications. Specifically, no frequent epistaxis. No issues with oral mucosal bleeding. Denies any hematuria or blood in stools. Denies any shortness of breath. No chest pain. No cough. No fever.      Medications:   Current Outpatient Medications   Medication Sig Dispense Refill     amLODIPine (NORVASC) 5 MG tablet Take 1 tablet (5 mg) by mouth daily 90 tablet 1     atorvastatin (LIPITOR) 40 MG tablet Take 1 tablet (40 mg) by mouth daily 90 tablet 4     Cholecalciferol (VITAMIN D3) 25 MCG (1000 UT) CAPS Take 1 capsule by mouth daily       Multiple Vitamins-Minerals (HAIR SKIN AND NAILS FORMULA PO) Take 2 chew tab by mouth       Multiple Vitamins-Minerals (MACULAR VITAMIN BENEFIT PO) Take 1 tablet by mouth daily Focus MaculaPro          Allergies:      Allergies   Allergen Reactions      Erythromycin Itching     Sulfa Antibiotics Visual Disturbance     Affected eyesight       PMH:  Past Medical History:   Diagnosis Date     Basal cell carcinoma      Hypertension 2010     Macular degeneration      PONV (postoperative nausea and vomiting)      Pulmonary embolism (H)      Pulmonary embolism, bilateral (H) 2012    Post-surgical at [facility] following total knee replacement      Shingles outbreak 2016     Squamous cell carcinoma of skin, unspecified         Social History:   Social History     Tobacco Use     Smoking status: Former     Years: 20.00     Types: Cigarettes     Quit date: 1982     Years since quittin.4     Smokeless tobacco: Never   Vaping Use     Vaping Use: Never used   Substance Use Topics     Alcohol use: Yes     Comment: occ wine     Drug use: No       Family History:  Deferred.    Objective:  Vitals: There were no vitals taken for this visit.     Exam:   Constitutional: Appears well, no distress  HEENT: Pupils equal and round. No scleral icterus or hemorrhage.   Respiratory: no increased work of breathing.   Mus/Skele: no edema of lower extremities  Skin: no petechiae, no ecchymosis on exposed dermis.  Neuro: CN II-XII intact. Normal gait. AOx3      Labs:  CBC RESULTS:   Recent Labs   Lab Test 10/21/22  1412   WBC 9.7   RBC 4.21   HGB 12.3   HCT 38.6   MCV 92   MCH 29.2   MCHC 31.9   RDW 13.2        Last Comprehensive Metabolic Panel:  Sodium   Date Value Ref Range Status   10/21/2022 141 136 - 145 mmol/L Final   10/21/2020 141 133 - 144 mmol/L Final     Potassium   Date Value Ref Range Status   10/21/2022 4.6 3.4 - 5.3 mmol/L Final   2022 3.9 3.4 - 5.3 mmol/L Final   10/21/2020 4.2 3.4 - 5.3 mmol/L Final     Chloride   Date Value Ref Range Status   10/21/2022 102 98 - 107 mmol/L Final   2022 106 94 - 109 mmol/L Final   10/21/2020 106 94 - 109 mmol/L Final     Carbon Dioxide   Date Value Ref Range Status   10/21/2020 30 20 - 32  mmol/L Final     Carbon Dioxide (CO2)   Date Value Ref Range Status   10/21/2022 26 22 - 29 mmol/L Final   02/22/2022 28 20 - 32 mmol/L Final     Anion Gap   Date Value Ref Range Status   10/21/2022 13 7 - 15 mmol/L Final   02/22/2022 5 3 - 14 mmol/L Final   10/21/2020 5 3 - 14 mmol/L Final     Glucose   Date Value Ref Range Status   10/21/2022 102 (H) 70 - 99 mg/dL Final   02/22/2022 114 (H) 70 - 99 mg/dL Final   10/21/2020 102 (H) 70 - 99 mg/dL Final     Urea Nitrogen   Date Value Ref Range Status   10/21/2022 19.5 8.0 - 23.0 mg/dL Final   02/22/2022 21 7 - 30 mg/dL Final   10/21/2020 20 7 - 30 mg/dL Final     Creatinine   Date Value Ref Range Status   10/21/2022 0.85 0.51 - 0.95 mg/dL Final   10/21/2020 0.91 0.52 - 1.04 mg/dL Final     GFR Estimate   Date Value Ref Range Status   10/21/2022 69 >60 mL/min/1.73m2 Final     Comment:     Effective December 21, 2021 eGFRcr in adults is calculated using the 2021 CKD-EPI creatinine equation which includes age and gender (Kellie et al., NEJ, DOI: 10.1056/HOJRdk6668168)   10/21/2020 60 (L) >60 mL/min/[1.73_m2] Final     Comment:     Non  GFR Calc  Starting 12/18/2018, serum creatinine based estimated GFR (eGFR) will be   calculated using the Chronic Kidney Disease Epidemiology Collaboration   (CKD-EPI) equation.       Calcium   Date Value Ref Range Status   10/21/2022 9.8 8.8 - 10.2 mg/dL Final   10/21/2020 9.5 8.5 - 10.1 mg/dL Final     Liver Function Studies -   Recent Labs   Lab Test 11/30/21  1602   PROTTOTAL 8.1   ALBUMIN 3.7   BILITOTAL 0.4   ALKPHOS 108   AST 24   ALT 31        Imaging:  BLE US 2/20/2022   1.  No deep venous thrombosis in the bilateral lower extremities.     CT Chest 2/20/2022  IMPRESSION:  1.  The study is positive for multiple bilateral pulmonary emboli. No evidence of right heart strain.  2.  Moderate atherosclerotic vascular calcification of the coronary arteries.     CT chest 11/2021  IMPRESSION:  1.  No evidence of pulmonary  embolism.  2.  A few scattered groundglass opacities in the lungs compatible with  COVID 19.     US venous competency 5/2015  IMPRESSION:  1. Negative for deep venous thrombosis in both lower extremities.  2. Left common femoral vein incompetent, remaining deep vein segments  competent bilaterally.  3. Left greater saphenous vein incompetent at the saphenofemoral  junction. Otherwise, remaining left GSV segments are competent in all  of the right GSV is competent.  4. Small saphenous vein too small to adequately characterize.    Assessment:  Marlene Millan is a 83 year old female with history of hypertension, dyslipidemia, coronary calcification, PFO, venous incompetency and recurrent pulmonary emboli who is not currently on long term anticoagulation. She has been recommended to consider long term prophylaxis for venous thromboembolism prevention given that her episode in 2022 was minimally provoked. She has declined but has been agreeable to intermittent venous thromboembolism prophylaxis during high risk time periods.     1. DVT/PE 2012 - provoked following TKA s/p warfarin x 3 months   2. PE 2022 - after travel < 3 hours in duration thus minimally provoked, anticoagulation x 3 months.   3. Hx of small PFO   4. Travels frequently via air  5. Hx of fall and rotator cuff injury 2022    Plan:  Reiterated recommendation for long term anticoagulation. She declines but is open to intermittent anticoagulation during high risk time periods. She has no upcoming surgeries/procedures planned but is getting a second opinion on rotator cuff surgery. She is planning international travel and I have recommended she take a dose of Eliquis 5mg two hours prior to her flights. She does not anticipate any long car travel or immobility on the cruise ship. She will take a dose prior to her return flight from [place] too. If any increased leg pain, swelling she should start the Eliquis and contact the clinic. If any shortness of  breath or chest pain, she should seek emergency care evaluation.     Patient instructed to contact the clinic should they require any surgical procedures for perioperative planning.     Return to clinic in 1 year, sooner if any concerns.        Maddi Brown, MPH, PA-C  [facility]      Phone-Visit Details: Patient did not connect to virtual visit.   Type of service:  Phone Visit  Start Time: 1308  End Time (time video stopped): 1323  Originating Location (pt. Location): Home  Distant Location (provider location):  [facility]  Mode of Communication:  Phone    20 minutes spent by me on the date of the encounter doing chart review, review of outside records, review of test results, interpretation of tests, patient visit and documentation

## 2023-07-24 ENCOUNTER — TRANSFERRED RECORDS (OUTPATIENT)
Dept: HEALTH INFORMATION MANAGEMENT | Facility: CLINIC | Age: 83
End: 2023-07-24
Payer: COMMERCIAL

## 2023-07-27 ENCOUNTER — THERAPY VISIT (OUTPATIENT)
Dept: PHYSICAL THERAPY | Facility: CLINIC | Age: 83
End: 2023-07-27
Attending: PHYSICIAN ASSISTANT
Payer: COMMERCIAL

## 2023-07-27 DIAGNOSIS — M25.511 CHRONIC RIGHT SHOULDER PAIN: Primary | ICD-10-CM

## 2023-07-27 DIAGNOSIS — G89.29 CHRONIC RIGHT SHOULDER PAIN: Primary | ICD-10-CM

## 2023-07-27 PROCEDURE — 97110 THERAPEUTIC EXERCISES: CPT | Mod: GP

## 2023-07-31 ENCOUNTER — OFFICE VISIT (OUTPATIENT)
Dept: DERMATOLOGY | Facility: CLINIC | Age: 83
End: 2023-07-31
Payer: COMMERCIAL

## 2023-07-31 DIAGNOSIS — C44.529 SQUAMOUS CELL CARCINOMA OF SKIN OF CHEST: Primary | ICD-10-CM

## 2023-07-31 DIAGNOSIS — E78.5 HYPERLIPIDEMIA LDL GOAL <130: ICD-10-CM

## 2023-07-31 PROCEDURE — 13101 CMPLX RPR TRUNK 2.6-7.5 CM: CPT | Performed by: DERMATOLOGY

## 2023-07-31 PROCEDURE — 17313 MOHS 1 STAGE T/A/L: CPT | Performed by: DERMATOLOGY

## 2023-07-31 ASSESSMENT — PAIN SCALES - GENERAL: PAINLEVEL: NO PAIN (0)

## 2023-07-31 NOTE — NURSING NOTE
Chief Complaint   Patient presents with    Derm Problem     Mohs- R mid chest        There were no vitals filed for this visit.  Wt Readings from Last 1 Encounters:   01/10/23 73.9 kg (163 lb)       Anna Gonzalez LPN .................7/31/2023

## 2023-07-31 NOTE — PATIENT INSTRUCTIONS
Sutured Wound Care     [facility]: [phone]  [facility]: [phone]    Right Chest    No strenuous activity for 48 hours. Resume moderate activity in 48 hours. No heavy exercising until you are seen for follow up in one week.     Take Tylenol as needed for discomfort.                         Do not drink alcoholic beverages for 48 hours.     Keep the pressure bandage in place for 24 hours. If the bandage becomes blood tinged or loose, reinforce it with gauze and tape.        (Refer to the reverse side of this page for management of bleeding).    Remove pressure bandage in 24 hours (White Gauze & White Tape)    Leave the flat bandage in place until your follow up appointment. (Light Blue Tape & Steri Strips)    Keep the bandage dry. Wash around it carefully.    If the tape becomes soiled or starts to come off, reinforce it with additional paper tape.    Do not smoke for 3 weeks; smoking is detrimental to wound healing.    It is normal to have swelling and bruising around the surgical site. The bruising will fade in approximately 10-14 days. Elevate the area to reduce swelling.    Numbness, itchiness and sensitivity to temperature changes can occur after surgery and may take up to 18 months to normalize.    POSSIBLE COMPLICATIONS    BLEEDING:    Leave the bandage in place.  Use tightly rolled up gauze or a cloth to apply direct pressure over the bandage for 20   minutes.  Reapply pressure for an additional 20 minutes if necessary  Call the office or go to the nearest emergency room if pressure fails to stop the bleeding.  Use additional gauze and tape to maintain pressure once the bleeding has stopped.    PAIN:    Post operative pain should slowly get better, never worse.  A severe increase in pain may indicate a problem. Call the office if this occurs.    In case of emergency phone:Dr Anderson [phone]

## 2023-07-31 NOTE — PROGRESS NOTES
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  Patient has no other skin complaints today.  Remainder of the HPI, Meds, PMH, Allergies, FH, and SH was reviewed in chart.      Past Medical History:   Diagnosis Date    Basal cell carcinoma     Hypertension 06/07/2010    Macular degeneration     PONV (postoperative nausea and vomiting)     Pulmonary embolism (H)     Pulmonary embolism, bilateral (H) 02/19/2012    Post-surgical at [facility] following total knee replacement     Shingles outbreak 12/2016    Squamous cell carcinoma of skin, unspecified        Past Surgical History:   Procedure Laterality Date    basal cell cancer of nose  Oct 2012    BREAST SURGERY      breast reduction    CARPAL TUNNEL RELEASE RT/LT      right    COLONOSCOPY N/A 1/10/2023    Procedure: COLONOSCOPY with cold polypectomies;  Surgeon: Sachin Tan DO;  Location: [facility] OR    COMBINED REPAIR PTOSIS WITH BLEPHAROPLASTY BILATERAL Bilateral 6/30/2015    Procedure: COMBINED REPAIR PTOSIS WITH BLEPHAROPLASTY BILATERAL;  Surgeon: Ilir Marvin MD;  Location: [facility]    ESOPHAGOSCOPY, GASTROSCOPY, DUODENOSCOPY (EGD), COMBINED N/A 1/10/2023    Procedure: ESOPHAGOGASTRODUODENOSCOPY WITH biopsies and 45 Samoan Savary Dilation;  Surgeon: Sachin Tan DO;  Location: [facility] OR    HC REMOVAL GALLBLADDER      LIGATE VEIN VARICOSE, PHLEBECTOMY MULTIPLE STAB, COMBINED Bilateral 8/13/2015    Procedure: COMBINED LIGATE VEIN VARICOSE, PHLEBECTOMY MULTIPLE STAB;  Surgeon: Alphonse Pro MD;  Location: [facility] OR    ORTHOPEDIC SURGERY      bilateral knee    PHACOEMULSIFICATION WITH STANDARD INTRAOCULAR LENS IMPLANT Right 12/21/2017    Procedure: PHACOEMULSIFICATION WITH STANDARD INTRAOCULAR LENS IMPLANT;  Right Cataract Removal with Implant;  Surgeon: Clif Michel MD;  Location: [facility] OR    SURGICAL HISTORY OF -        breast reduction mammoplasty     SURGICAL HISTORY OF -       bilateral lower extremity vein stripping    SURGICAL HISTORY OF -   2010    left total knee arthroplasty        Family History   Problem Relation Age of Onset    Diabetes Father         last both legs to the disease    Heart Disease Father          age 91       Social History     Socioeconomic History    Marital status:      Spouse name: Not on file    Number of children: Not on file    Years of education: Not on file    Highest education level: Not on file   Occupational History    Not on file   Tobacco Use    Smoking status: Former     Years: 20.00     Types: Cigarettes     Quit date: 1982     Years since quittin.4    Smokeless tobacco: Never   Vaping Use    Vaping Use: Never used   Substance and Sexual Activity    Alcohol use: Yes     Comment: occ wine    Drug use: No    Sexual activity: Not Currently   Other Topics Concern    Parent/sibling w/ CABG, MI or angioplasty before 65F 55M? No   Social History Narrative    Not on file     Social Determinants of Health     Financial Resource Strain: Not on file   Food Insecurity: Not on file   Transportation Needs: Not on file   Physical Activity: Not on file   Stress: Not on file   Social Connections: Not on file   Intimate Partner Violence: Not on file   Housing Stability: Not on file       Outpatient Encounter Medications as of 2023   Medication Sig Dispense Refill    amLODIPine (NORVASC) 5 MG tablet Take 1 tablet (5 mg) by mouth daily 90 tablet 1    apixaban ANTICOAGULANT (ELIQUIS) 5 MG tablet Take 1 tablet (5 mg) by mouth as needed (prior to long distance travel. Repeat dose 12 hours later if travel length > 12 hours.)      atorvastatin (LIPITOR) 40 MG tablet Take 1 tablet (40 mg) by mouth daily 90 tablet 4    Cholecalciferol (VITAMIN D3) 25 MCG (1000 UT) CAPS Take 1 capsule by mouth daily      Multiple Vitamins-Minerals (HAIR SKIN AND NAILS FORMULA PO) Take 2 chew  tab by mouth      Multiple Vitamins-Minerals (MACULAR VITAMIN BENEFIT PO) Take 1 tablet by mouth daily Focus MaculaPro       No facility-administered encounter medications on file as of 7/31/2023.             O:   NAD, WDWN, Alert & Oriented, Mood & Affect wnl, Vitals stable   Here today alone   There were no vitals taken for this visit.   General appearance normal   Vitals stable   Alert, oriented and in no acute distress      Following lymph nodes palpated: Supraclavicular no lad  Mid chest 8mm keratotic nodule       Eyes: Conjunctivae/lids:Normal     ENT: Lips, buccal mucosa, tongue: normal    MSK:Normal    Cardiovascular: peripheral edema none    Pulm: Breathing Normal    Neuro/Psych: Orientation:Alert and Orientedx3 ; Mood/Affect:normal       A/P:  Squamous cell carcinoma chest   MOHS:   Aggressive histology    The rationale for Mohs surgery was discussed with the patient and consent was obtained.  The risks and benefits as well as alternatives to therapy were discussed, in detail.  Specifically, the risks of infection, scarring, bleeding, prolonged wound healing, incomplete removal, allergy to anesthesia, nerve injury and recurrence were addressed.  Indication for Mohs was Aggressive histology. Prior to the procedure, the treatment site was clearly identified and, if available, confirmed with previous photos and confirmed by the patient   All components of the Universal Protocol/PAUSE rule were completed.  The Mohs surgeon operated in two distinct and integrated capacities as the surgeon and pathologist.      The area was prepped with Betasept.  A rim of normal appearing skin was marked circumferentially around the lesion.  The area was infiltrated with local anesthesia.  The tumor was first debulked to remove all clinically apparent tumor.  An incision following the standard Mohs approach was done and the specimen was oriented,mapped and placed in 1 block(s).  Each specimen was then chromacoded and processed  in the Mohs laboratory using standard Mohs technique and submitted for frozen section histology.  Frozen section analysis showed no residual tumor but CLEAR MARGINS.      The tumor was excised using standard Mohs technique in 1 stages(s).  CLEAR MARGINS OBTAINED and Final defect size was 1.4 cm.     We discussed the options for wound management in full with the patient including risks/benefits/ possible outcomes.    .  REPAIR COMPLEX: Because of the tightness of the surrounding skin and Because of the size and full thickness nature of the defect, Because of the tightness of the surrounding skin, To maintain form and function, and In order to avoid distortion, a complex closure was planned. After LE anesthesia and prep, Burow's triangles were excised in the relaxed skin tension lines. The wound edges were widely undermined greater than width of the defect on both sides by dissection in the subcutaneous plane until adequate tissue mobility was obtained. Hemostasis was obtained. The wound edges were closed in a layered fashion using Vicryl and Fast Absorbing Plain Gut sutures. Postoperative length was 3 cm.   EBL minimal; complications none; wound care routine.  The patient was discharged in good condition and will return in one week for wound evaluation.    It was a pleasure speaking to Marlene Millan today.  Previous clinic notes and pertinent laboratory tests were reviewed prior to Marlene Millan's visit.  Nature and genetics of benign skin lesions dicussed with patient.  Signs and Symptoms of skin cancer discussed with patient.  Patient encouraged to perform monthly skin exams.  UV precautions reviewed with patient.  Risks of non-melanoma skin cancer discussed with patient   Return to clinic 6 months

## 2023-07-31 NOTE — LETTER
7/31/2023         RE: Marlene Millan  [address] [address] [facility] [place] [address]        Dear Colleague,    Thank you for referring your patient, Marlene Millan, to the [facility]. Please see a copy of my visit note below.    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  Patient has no other skin complaints today.  Remainder of the HPI, Meds, PMH, Allergies, FH, and SH was reviewed in chart.      Past Medical History:   Diagnosis Date     Basal cell carcinoma      Hypertension 06/07/2010     Macular degeneration      PONV (postoperative nausea and vomiting)      Pulmonary embolism (H)      Pulmonary embolism, bilateral (H) 02/19/2012    Post-surgical at [facility] following total knee replacement      Shingles outbreak 12/2016     Squamous cell carcinoma of skin, unspecified        Past Surgical History:   Procedure Laterality Date     basal cell cancer of nose  Oct 2012     BREAST SURGERY      breast reduction     CARPAL TUNNEL RELEASE RT/LT      right     COLONOSCOPY N/A 1/10/2023    Procedure: COLONOSCOPY with cold polypectomies;  Surgeon: Sachin Tan DO;  Location: [facility] OR     COMBINED REPAIR PTOSIS WITH BLEPHAROPLASTY BILATERAL Bilateral 6/30/2015    Procedure: COMBINED REPAIR PTOSIS WITH BLEPHAROPLASTY BILATERAL;  Surgeon: Ilir Marvin MD;  Location: [facility]     ESOPHAGOSCOPY, GASTROSCOPY, DUODENOSCOPY (EGD), COMBINED N/A 1/10/2023    Procedure: ESOPHAGOGASTRODUODENOSCOPY WITH biopsies and 45 Czech Savary Dilation;  Surgeon: Sachin Tan DO;  Location: [facility] OR     HC REMOVAL GALLBLADDER       LIGATE VEIN VARICOSE, PHLEBECTOMY MULTIPLE STAB, COMBINED Bilateral 8/13/2015    Procedure: COMBINED LIGATE VEIN VARICOSE, PHLEBECTOMY MULTIPLE  STAB;  Surgeon: Alphonse Pro MD;  Location: [facility] OR     ORTHOPEDIC SURGERY      bilateral knee     PHACOEMULSIFICATION WITH STANDARD INTRAOCULAR LENS IMPLANT Right 2017    Procedure: PHACOEMULSIFICATION WITH STANDARD INTRAOCULAR LENS IMPLANT;  Right Cataract Removal with Implant;  Surgeon: Clif Michel MD;  Location: [facility] OR     SURGICAL HISTORY OF -       breast reduction mammoplasty      SURGICAL HISTORY OF -       bilateral lower extremity vein stripping     SURGICAL HISTORY OF -   2010    left total knee arthroplasty        Family History   Problem Relation Age of Onset     Diabetes Father         last both legs to the disease     Heart Disease Father          age 91       Social History     Socioeconomic History     Marital status:      Spouse name: Not on file     Number of children: Not on file     Years of education: Not on file     Highest education level: Not on file   Occupational History     Not on file   Tobacco Use     Smoking status: Former     Years: 20.00     Types: Cigarettes     Quit date: 1982     Years since quittin.4     Smokeless tobacco: Never   Vaping Use     Vaping Use: Never used   Substance and Sexual Activity     Alcohol use: Yes     Comment: occ wine     Drug use: No     Sexual activity: Not Currently   Other Topics Concern     Parent/sibling w/ CABG, MI or angioplasty before 65F 55M? No   Social History Narrative     Not on file     Social Determinants of Health     Financial Resource Strain: Not on file   Food Insecurity: Not on file   Transportation Needs: Not on file   Physical Activity: Not on file   Stress: Not on file   Social Connections: Not on file   Intimate Partner Violence: Not on file   Housing Stability: Not on file       Outpatient Encounter Medications as of 2023   Medication Sig Dispense Refill     amLODIPine (NORVASC) 5 MG tablet Take 1 tablet (5 mg) by mouth daily 90 tablet 1     apixaban ANTICOAGULANT (ELIQUIS)  5 MG tablet Take 1 tablet (5 mg) by mouth as needed (prior to long distance travel. Repeat dose 12 hours later if travel length > 12 hours.)       atorvastatin (LIPITOR) 40 MG tablet Take 1 tablet (40 mg) by mouth daily 90 tablet 4     Cholecalciferol (VITAMIN D3) 25 MCG (1000 UT) CAPS Take 1 capsule by mouth daily       Multiple Vitamins-Minerals (HAIR SKIN AND NAILS FORMULA PO) Take 2 chew tab by mouth       Multiple Vitamins-Minerals (MACULAR VITAMIN BENEFIT PO) Take 1 tablet by mouth daily Focus MaculaPro       No facility-administered encounter medications on file as of 7/31/2023.             O:   NAD, WDWN, Alert & Oriented, Mood & Affect wnl, Vitals stable   Here today alone   There were no vitals taken for this visit.   General appearance normal   Vitals stable   Alert, oriented and in no acute distress      Following lymph nodes palpated: Supraclavicular no lad  Mid chest 8mm keratotic nodule       Eyes: Conjunctivae/lids:Normal     ENT: Lips, buccal mucosa, tongue: normal    MSK:Normal    Cardiovascular: peripheral edema none    Pulm: Breathing Normal    Neuro/Psych: Orientation:Alert and Orientedx3 ; Mood/Affect:normal       A/P:  Squamous cell carcinoma chest   MOHS:   Aggressive histology    The rationale for Mohs surgery was discussed with the patient and consent was obtained.  The risks and benefits as well as alternatives to therapy were discussed, in detail.  Specifically, the risks of infection, scarring, bleeding, prolonged wound healing, incomplete removal, allergy to anesthesia, nerve injury and recurrence were addressed.  Indication for Mohs was Aggressive histology. Prior to the procedure, the treatment site was clearly identified and, if available, confirmed with previous photos and confirmed by the patient   All components of the Universal Protocol/PAUSE rule were completed.  The Mohs surgeon operated in two distinct and integrated capacities as the surgeon and pathologist.      The area was  prepped with Betasept.  A rim of normal appearing skin was marked circumferentially around the lesion.  The area was infiltrated with local anesthesia.  The tumor was first debulked to remove all clinically apparent tumor.  An incision following the standard Mohs approach was done and the specimen was oriented,mapped and placed in 1 block(s).  Each specimen was then chromacoded and processed in the Mohs laboratory using standard Mohs technique and submitted for frozen section histology.  Frozen section analysis showed no residual tumor but CLEAR MARGINS.      The tumor was excised using standard Mohs technique in 1 stages(s).  CLEAR MARGINS OBTAINED and Final defect size was 1.4 cm.     We discussed the options for wound management in full with the patient including risks/benefits/ possible outcomes.    .  REPAIR COMPLEX: Because of the tightness of the surrounding skin and Because of the size and full thickness nature of the defect, Because of the tightness of the surrounding skin, To maintain form and function, and In order to avoid distortion, a complex closure was planned. After LE anesthesia and prep, Burow's triangles were excised in the relaxed skin tension lines. The wound edges were widely undermined greater than width of the defect on both sides by dissection in the subcutaneous plane until adequate tissue mobility was obtained. Hemostasis was obtained. The wound edges were closed in a layered fashion using Vicryl and Fast Absorbing Plain Gut sutures. Postoperative length was 3 cm.   EBL minimal; complications none; wound care routine.  The patient was discharged in good condition and will return in one week for wound evaluation.    It was a pleasure speaking to Marlene Millan today.  Previous clinic notes and pertinent laboratory tests were reviewed prior to Marlene Millan's visit.  Nature and genetics of benign skin lesions dicussed with patient.  Signs and Symptoms of skin cancer discussed with  patient.  Patient encouraged to perform monthly skin exams.  UV precautions reviewed with patient.  Risks of non-melanoma skin cancer discussed with patient   Return to clinic 6 months      Again, thank you for allowing me to participate in the care of your patient.        Sincerely,        Errol Anderson MD

## 2023-08-01 RX ORDER — ATORVASTATIN CALCIUM 40 MG/1
TABLET, FILM COATED ORAL
Qty: 90 TABLET | Refills: 1 | Status: SHIPPED | OUTPATIENT
Start: 2023-08-01 | End: 2024-03-13

## 2023-08-07 ENCOUNTER — ALLIED HEALTH/NURSE VISIT (OUTPATIENT)
Dept: DERMATOLOGY | Facility: CLINIC | Age: 83
End: 2023-08-07
Payer: COMMERCIAL

## 2023-08-07 DIAGNOSIS — Z48.01 ENCOUNTER FOR CHANGE OR REMOVAL OF SURGICAL WOUND DRESSING: Primary | ICD-10-CM

## 2023-08-07 PROCEDURE — 99207 PR NO CHARGE NURSE ONLY: CPT

## 2023-08-07 NOTE — PROGRESS NOTES
Marlene Millan comes into clinic today at the request of Dr. Anderson Ordering Provider for Wound Check Action taken: See Below.    This service provided today was under the supervising provider of the day Dr. Anderson, who was available if needed.    Pt returned to clinic for post surgery 1 week follow up bandage change. Pt has no complaints, denies pain. Bandage removed from right chest, area cleansed with normal saline. Site is healing and wound edges approximating well. Reapplied new steri strips and paper tape.    Advised to watch for signs/sx of infection; spreading redness, drainage, odor, fever. Call or report promptly to clinic. Pt given written instructions and informed to rtc as needed. Patient verbalized understanding.     Erika REESE,  CMA

## 2023-08-07 NOTE — PATIENT INSTRUCTIONS
WOUND CARE INSTRUCTIONS  for  ONE WEEK AFTER SURGERY          Leave flat bandage on your skin for one week after today s bandage change.  In one week when you remove the bandage, you may resume your regular skin care routine, including washing with mild soap and water, applying moisturizer, make-up and sunscreen.    If there are any open or bleeding areas at the incision/graft site you should begin to cover the area with a bandage daily as follows:    Clean and dry the area with plain tap water using a Q-tip or sterile gauze pad.  Apply Polysporin or Bacitracin ointment to the open area.  Cover the wound with a band-aid or a sterile non-stick gauze pad and micropore paper tape.         SIGNS OF INFECTION  - If you notice any of these signs of infection, call your doctor right away: expanding redness around the wound.  - Yellow or greenish-colored pus or cloudy wound drainage.    - Red streaking spreading from the wound.  - Increased swelling, tenderness, or pain around the wound.   - Fever.    Please remember that yellow and clear drainage from a wound can be normal and related to normal wound healing.  Isolated drainage from a wound without a combination of the above features does not indicate infection.       *Once the bandages are removed, the scar will be red and firm (especially in the lip/chin area). This is normal and will fade in time. It might take 6-12 months for this to happen.     *Massaging the area will help the scar soften and fade quicker. Begin to massage the area one month after the bandages have been removed. To massage apply pressure directly and firmly over the scar with the fingertips and move in a circular motion. Massage the area for a few minutes several times a day. Continue to massage the site for several months.    *Approximately 6-8 weeks after surgery it is not uncommon to see the formation of  tender pimple-like  bump along the scar. This is normal. As the scar continues to mature  and the stitches underneath the skin begin to dissolve, this might occur. Do not pick or squeeze, this will resolve on it s own. Should one break open producing a small amount of drainage, apply Polysporin or Bacitracin ointment a few times a day until the wound is completely healed.    *Numbness in the surgical area is expected. It might take 12-18 months for the feeling to return to normal. During this time sensations of itchiness, tingling and occasional sharp pains might be noted. These feelings are normal and will subside once the nerves have completely healed.         IN CASE OF EMERGENCY: Dr Anderson [phone]     If you were seen in [place] call: [phone]    If you were seen in [place] call: [phone]

## 2023-08-31 ENCOUNTER — THERAPY VISIT (OUTPATIENT)
Dept: PHYSICAL THERAPY | Facility: CLINIC | Age: 83
End: 2023-08-31
Attending: PHYSICIAN ASSISTANT
Payer: COMMERCIAL

## 2023-08-31 DIAGNOSIS — G89.29 CHRONIC RIGHT SHOULDER PAIN: Primary | ICD-10-CM

## 2023-08-31 DIAGNOSIS — M25.511 CHRONIC RIGHT SHOULDER PAIN: Primary | ICD-10-CM

## 2023-08-31 PROCEDURE — 97110 THERAPEUTIC EXERCISES: CPT | Mod: GP

## 2023-09-14 ENCOUNTER — THERAPY VISIT (OUTPATIENT)
Dept: PHYSICAL THERAPY | Facility: CLINIC | Age: 83
End: 2023-09-14
Attending: PHYSICIAN ASSISTANT
Payer: COMMERCIAL

## 2023-09-14 DIAGNOSIS — B00.9 HSV (HERPES SIMPLEX VIRUS) INFECTION: ICD-10-CM

## 2023-09-14 DIAGNOSIS — G89.29 CHRONIC RIGHT SHOULDER PAIN: Primary | ICD-10-CM

## 2023-09-14 DIAGNOSIS — M25.511 CHRONIC RIGHT SHOULDER PAIN: Primary | ICD-10-CM

## 2023-09-14 PROCEDURE — 97110 THERAPEUTIC EXERCISES: CPT | Mod: GP

## 2023-09-14 RX ORDER — VALACYCLOVIR HYDROCHLORIDE 1 G/1
TABLET, FILM COATED ORAL
Qty: 12 TABLET | Refills: 4 | Status: SHIPPED | OUTPATIENT
Start: 2023-09-14

## 2023-09-14 NOTE — TELEPHONE ENCOUNTER
Patient would like a refill of this medication.   Thank you,    Genesis Swift  Certified Pharmacy Technician II, [facility]  Ph: [phone]  Fx: [phone]

## 2023-09-14 NOTE — TELEPHONE ENCOUNTER
"Requested Prescriptions   Pending Prescriptions Disp Refills    valACYclovir (VALTREX) 1000 mg tablet 12 tablet 4     Si tabs at onset of cold sores. Repeat dose after 12 hours.       Antivirals for Herpes Protocol Failed - 2023 11:50 AM        Failed - Medication is active on med list        Passed - Patient is age 12 or older        Passed - Recent (12 mo) or future (30 days) visit within the authorizing provider's specialty     Patient has had an office visit with the authorizing provider or a provider within the authorizing providers department within the previous 12 mos or has a future within next 30 days. See \"Patient Info\" tab in inbasket, or \"Choose Columns\" in Meds & Orders section of the refill encounter.              Passed - Normal serum creatinine on file in past 12 months     Recent Labs   Lab Test 10/21/22  1412   CR 0.85       Ok to refill medication if creatinine is low            Last Written Prescription Date:  22  Last Fill Quantity: 12,  # refills: 4   Last office visit: 2023 ; last virtual visit: 2021 with prescribing provider:     Future Office Visit:   Next 5 appointments (look out 90 days)      2023  1:15 PM  (Arrive by 1:00 PM)  Return Visit with Errol Anderson MD  [facility] ([facility] [place] ) [address] [place] [address]  [phone]           Julie Behrendt RN          "

## 2023-09-20 DIAGNOSIS — I10 ESSENTIAL HYPERTENSION: ICD-10-CM

## 2023-09-20 RX ORDER — AMLODIPINE BESYLATE 5 MG/1
5 TABLET ORAL DAILY
Qty: 90 TABLET | Refills: 1 | Status: SHIPPED | OUTPATIENT
Start: 2023-09-20 | End: 2024-02-21

## 2023-10-05 ENCOUNTER — VIRTUAL VISIT (OUTPATIENT)
Dept: FAMILY MEDICINE | Facility: CLINIC | Age: 83
End: 2023-10-05
Payer: COMMERCIAL

## 2023-10-05 DIAGNOSIS — M54.2 CERVICALGIA: Primary | ICD-10-CM

## 2023-10-05 PROCEDURE — 99213 OFFICE O/P EST LOW 20 MIN: CPT | Mod: VID | Performed by: FAMILY MEDICINE

## 2023-10-05 NOTE — PROGRESS NOTES
"Janee is a 83 year old who is being evaluated via a billable video visit.      How would you like to obtain your AVS? MyChart  If the video visit is dropped, the invitation should be resent by: Text to cell phone: [phone]  Will anyone else be joining your video visit? No          Assessment & Plan     Cervicalgia  Based on symptoms certainly sound suspicious for muscle spasm/torticollis this.  Will obtain an x-ray to rule out any bony pathology.  No signs of cervical nerve impingement.  Advised heat gentle stretching offered trial of muscle relaxants which she prefer to hold off on for now.  If symptoms do not improve or worsen recommended office visit for clinical exam and if x-ray warrants any further follow-up I will contact her and let her know.  - XR Cervical Spine 2/3 Views; Future             BMI:   Estimated body mass index is 28.65 kg/m  as calculated from the following:    Height as of 1/10/23: 1.607 m (5' 3.25\").    Weight as of 1/10/23: 73.9 kg (163 lb).           Katy Rodriguez MD  [facility]    Pablo Weller is a 83 year old, presenting for the following health issues:  Neck Pain      10/5/2023     1:16 PM   Additional Questions   Roomed by ROLY Parr     Concern - Neck pain  Onset: a few months  Description: Stiff neck  Intensity: mild, moderate, severe  Progression of Symptoms:  intermittent and waxing and waning      Concern - Hair Loss  Onset: Ongoing        Review of Systems   Constitutional, neuro, ENT, endocrine, pulmonary, cardiac, gastrointestinal, genitourinary, musculoskeletal, integument and psychiatric systems are negative, except as otherwise noted.       Objective           Vitals:  No vitals were obtained today due to virtual visit.    Physical Exam   GENERAL: Healthy, alert and no distress  EYES: Eyes grossly normal to inspection.  No discharge or erythema, or obvious scleral/conjunctival abnormalities.  RESP: No audible " wheeze, cough, or visible cyanosis.  No visible retractions or increased work of breathing.    SKIN: Visible skin clear. No significant rash, abnormal pigmentation or lesions.  NEURO: Cranial nerves grossly intact.  Mentation and speech appropriate for age.  PSYCH: Mentation appears normal, affect normal/bright, judgement and insight intact, normal speech and appearance well-groomed.                Video-Visit Details    Type of service:  Video Visit   Video Start Time:  1:31  Video End Time:1:47 PM    Originating Location (pt. Location): Home    Distant Location (provider location):  On-site  Platform used for Video Visit: [employer]

## 2023-10-06 ENCOUNTER — TELEPHONE (OUTPATIENT)
Dept: DERMATOLOGY | Facility: CLINIC | Age: 83
End: 2023-10-06
Payer: COMMERCIAL

## 2023-10-06 DIAGNOSIS — L64.9 ANDROGENETIC ALOPECIA: Primary | ICD-10-CM

## 2023-10-06 NOTE — TELEPHONE ENCOUNTER
[facility] Call Center    Phone Message    May a detailed message be left on voicemail: yes     Reason for Call: Other: Pt would like to discuss medication for sudden extreme hair loss.      Writer informed Pt she would need to schedule a hair loss visit to discuss treatment. Pt insisted that she sees Dr. Anderson every few months and she would just like a call back.     Please call Janee back at [phone]. Thank you.     Action Taken: Other: [facility]    Travel Screening: Not Applicable

## 2023-10-06 NOTE — TELEPHONE ENCOUNTER
Called patient and she reported that she talked to Dr. Landaverde and she is taking injections for her eyes and she was told these injection medications will not cause hair loss. She would like to start a prescription for hair loss. She reported she is losing hair rapidly. Pt stated she is panicking due to the increased hair loss. She uses the [facility] pharmacy. Pt stated she has an appt in early November, but does not want to wait for the appt and would like to start a pill for this that another provider suggested.   Jayna FINNEY RN BSN PHN  Specialty Clinics

## 2023-10-09 ENCOUNTER — ANCILLARY PROCEDURE (OUTPATIENT)
Dept: GENERAL RADIOLOGY | Facility: CLINIC | Age: 83
End: 2023-10-09
Attending: FAMILY MEDICINE
Payer: COMMERCIAL

## 2023-10-09 DIAGNOSIS — M54.2 CERVICALGIA: ICD-10-CM

## 2023-10-09 PROCEDURE — 72040 X-RAY EXAM NECK SPINE 2-3 VW: CPT | Mod: TC | Performed by: STUDENT IN AN ORGANIZED HEALTH CARE EDUCATION/TRAINING PROGRAM

## 2023-10-09 RX ORDER — MINOXIDIL 2.5 MG/1
TABLET ORAL
Qty: 60 TABLET | Refills: 3 | Status: SHIPPED | OUTPATIENT
Start: 2023-10-09

## 2023-10-17 PROBLEM — G89.29 CHRONIC RIGHT SHOULDER PAIN: Status: RESOLVED | Noted: 2023-07-12 | Resolved: 2023-10-17

## 2023-10-17 PROBLEM — M25.511 CHRONIC RIGHT SHOULDER PAIN: Status: RESOLVED | Noted: 2023-07-12 | Resolved: 2023-10-17

## 2023-10-17 NOTE — PROGRESS NOTES
09/14/23 0500   Appointment Info   Signing clinician's name / credentials Genna Fuentes, PT   Total/Authorized Visits 12   Visits Used 4   Medical Diagnosis R shoulder pain   PT Tx Diagnosis R shoulder pain due to full thickness supraspinatus tear and tear of sup fibers of infraspinatus.   Quick Adds Certification   Progress Note/Certification   Start of Care Date 07/12/23   Onset of illness/injury or Date of Surgery 12/25/21   Therapy Frequency 1x/every other week   Predicted Duration 12 weeks   Certification date from 07/12/23   Certification date to 10/04/23   Progress Note Due Date 10/04/23   GOALS   PT Goals 2;3;4   PT Goal 1   Goal Identifier 1   Goal Description Pt will be able to sleep in bed without waking from pain.   Target Date 08/23/23   PT Goal 2   Goal Identifier 2   Goal Description Pt will be able to wash back/hair with < 3/10 pain   Target Date 09/13/23   PT Goal 3   Goal Identifier 3   Goal Description Pt will be able to reach for an object on a high shelf with < 3/10 pain   Target Date 10/04/23   PT Goal 4   Goal Identifier 4   Goal Description Pt will be independent with the HEP for optimal functional recovery.   Target Date 10/04/23   Subjective Report   Subjective Report Pt reports no pain. Would like to strengthen shld.   Objective Measures   Objective Measures Objective Measure 1   Objective Measure 1   Objective Measure L shld PROM   Treatment Interventions (PT)   Interventions Therapeutic Procedure/Exercise   Therapeutic Procedure/Exercise   Therapeutic Procedures: strength, endurance, ROM, flexibillity minutes (58295) 25   Ther Proc 1 Rev wand exs: for flex, abd, ER, IR. Rowing and pulldowns with GTB x 10, bent over T's and I's 2# x 10, Y's 1# x 10, standing ER without resistance x 10   Skilled Intervention PROM, ROM   Patient Response/Progress trena well   Education   Learner/Method Patient;Listening;Demonstration;Pictures/Video;No Barriers to Learning   Plan   Plan for next session  gentle strengthening   Total Session Time   Timed Code Treatment Minutes 25   Total Treatment Time (sum of timed and untimed services) 25         DISCHARGE  Reason for Discharge: Patient has failed to schedule further appointments.      Discharge Plan: Patient to continue home program.    Referring Provider:  Lencho Voss

## 2023-11-08 ENCOUNTER — OFFICE VISIT (OUTPATIENT)
Dept: DERMATOLOGY | Facility: CLINIC | Age: 83
End: 2023-11-08
Payer: COMMERCIAL

## 2023-11-08 DIAGNOSIS — D18.01 ANGIOMA OF SKIN: ICD-10-CM

## 2023-11-08 DIAGNOSIS — L81.4 LENTIGO: ICD-10-CM

## 2023-11-08 DIAGNOSIS — D23.9 DERMAL NEVUS: ICD-10-CM

## 2023-11-08 DIAGNOSIS — L64.9 ANDROGENETIC ALOPECIA: Primary | ICD-10-CM

## 2023-11-08 DIAGNOSIS — L82.0 INFLAMED SEBORRHEIC KERATOSIS: ICD-10-CM

## 2023-11-08 DIAGNOSIS — L82.1 SEBORRHEIC KERATOSIS: ICD-10-CM

## 2023-11-08 PROCEDURE — 99213 OFFICE O/P EST LOW 20 MIN: CPT | Mod: 25 | Performed by: DERMATOLOGY

## 2023-11-08 PROCEDURE — 17110 DESTRUCTION B9 LES UP TO 14: CPT | Performed by: DERMATOLOGY

## 2023-11-08 NOTE — LETTER
11/8/2023         RE: Marlene Millan  [id number] [address] [place] [address]        Dear Colleague,    Thank you for referring your patient, Marlene Millan, to the [facility]. Please see a copy of my visit note below.    Marlene Millan is an extremely pleasant 83 year old year old female patient here today for itching spots on neck.  She started minoxidil last week.  Patient has no other skin complaints today.  Remainder of the HPI, Meds, PMH, Allergies, FH, and SH was reviewed in chart.      Past Medical History:   Diagnosis Date     Basal cell carcinoma      Hypertension 06/07/2010     Macular degeneration      PONV (postoperative nausea and vomiting)      Pulmonary embolism (H)      Pulmonary embolism, bilateral (H) 02/19/2012    Post-surgical at [facility] following total knee replacement      Shingles outbreak 12/2016     Squamous cell carcinoma of skin, unspecified        Past Surgical History:   Procedure Laterality Date     basal cell cancer of nose  Oct 2012     BREAST SURGERY      breast reduction     CARPAL TUNNEL RELEASE RT/LT      right     COLONOSCOPY N/A 1/10/2023    Procedure: COLONOSCOPY with cold polypectomies;  Surgeon: Sachin Tan DO;  Location: [facility] OR     COMBINED REPAIR PTOSIS WITH BLEPHAROPLASTY BILATERAL Bilateral 6/30/2015    Procedure: COMBINED REPAIR PTOSIS WITH BLEPHAROPLASTY BILATERAL;  Surgeon: Ilir Marvin MD;  Location: [facility]     ESOPHAGOSCOPY, GASTROSCOPY, DUODENOSCOPY (EGD), COMBINED N/A 1/10/2023    Procedure: ESOPHAGOGASTRODUODENOSCOPY WITH biopsies and 45 Vietnamese Savary Dilation;  Surgeon: Sachin Tan DO;  Location: [facility] OR     HC REMOVAL GALLBLADDER       LIGATE VEIN VARICOSE, PHLEBECTOMY MULTIPLE STAB, COMBINED Bilateral 8/13/2015    Procedure: COMBINED LIGATE VEIN VARICOSE, PHLEBECTOMY MULTIPLE STAB;  Surgeon: Alphonse Pro MD;  Location: [facility]     ORTHOPEDIC SURGERY      bilateral knee      PHACOEMULSIFICATION WITH STANDARD INTRAOCULAR LENS IMPLANT Right 2017    Procedure: PHACOEMULSIFICATION WITH STANDARD INTRAOCULAR LENS IMPLANT;  Right Cataract Removal with Implant;  Surgeon: Clif Michel MD;  Location: [facility]     SURGICAL HISTORY OF -       breast reduction mammoplasty      SURGICAL HISTORY OF -       bilateral lower extremity vein stripping     SURGICAL HISTORY OF -   2010    left total knee arthroplasty        Family History   Problem Relation Age of Onset     Diabetes Father         last both legs to the disease     Heart Disease Father          age 91       Social History     Socioeconomic History     Marital status:      Spouse name: Not on file     Number of children: Not on file     Years of education: Not on file     Highest education level: Not on file   Occupational History     Not on file   Tobacco Use     Smoking status: Former     Years: 20     Types: Cigarettes     Quit date: 1982     Years since quittin.7     Smokeless tobacco: Never   Vaping Use     Vaping Use: Never used   Substance and Sexual Activity     Alcohol use: Yes     Comment: occ wine     Drug use: No     Sexual activity: Not Currently   Other Topics Concern     Parent/sibling w/ CABG, MI or angioplasty before 65F 55M? No   Social History Narrative     Not on file     Social Determinants of Health     Financial Resource Strain: Not on file   Food Insecurity: Not on file   Transportation Needs: Not on file   Physical Activity: Not on file   Stress: Not on file   Social Connections: Not on file   Interpersonal Safety: Not on file   Housing Stability: Not on file       Outpatient Encounter Medications as of 2023   Medication Sig Dispense Refill     amLODIPine (NORVASC) 5 MG tablet TAKE 1 TABLET DAILY 90 tablet 1     apixaban ANTICOAGULANT (ELIQUIS) 5 MG tablet Take 1 tablet (5 mg) by mouth as needed (prior to long distance travel. Repeat dose 12 hours later if travel length  > 12 hours.)       atorvastatin (LIPITOR) 40 MG tablet TAKE 1 TABLET DAILY 90 tablet 1     Cholecalciferol (VITAMIN D3) 25 MCG (1000 UT) CAPS Take 1 capsule by mouth daily       minoxidil (LONITEN) 2.5 MG tablet 1/4 tabelt daily 60 tablet 3     Multiple Vitamins-Minerals (HAIR SKIN AND NAILS FORMULA PO) Take 2 chew tab by mouth       Multiple Vitamins-Minerals (MACULAR VITAMIN BENEFIT PO) Take 1 tablet by mouth daily Focus MaculaPro       valACYclovir (VALTREX) 1000 mg tablet 2 tabs at onset of cold sores. Repeat dose after 12 hours. 12 tablet 4     No facility-administered encounter medications on file as of 11/8/2023.             O:   NAD, WDWN, Alert & Oriented, Mood & Affect wnl, Vitals stable   General appearance normal   Vitals stable   Alert, oriented and in no acute distress     Inflamed seborrheic keratosis on neck    Stuck on papules and brown macules on trunk and ext   Red papules on trunk  Flesh colored papules on trunk         Eyes: Conjunctivae/lids:Normal     ENT: Lips, buccal mucosa, tongue: normal    MSK:Normal    Cardiovascular: peripheral edema none    Pulm: Breathing Normal    Neuro/Psych: Orientation:Alert and Orientedx3 ; Mood/Affect:normal       A/P:  1. Seborrheic keratosis, lentigo, angioma, dermal nevus  2. Inflamed seborrheic keratosis neck x14  LN2:  Treated with LN2 for 5s for 1-2 cycles. Warned risks of blistering, pain, pigment change, scarring, and incomplete resolution.  Advised patient to return if lesions do not completely resolve.  Wound care sheet given.  3. Androgenetic alopecia  Cont minoxidil  Return to clinic 4 months  It was a pleasure speaking to Marlene Millan today.  Previous clinic notes and pertinent laboratory tests were reviewed prior to Marlene Millan's visit.    Nature and genetics of benign skin lesions dicussed with patient.      Again, thank you for allowing me to participate in the care of your patient.        Sincerely,        Errol Anderson MD

## 2023-11-08 NOTE — PROGRESS NOTES
Marlene Millan is an extremely pleasant 83 year old year old female patient here today for itching spots on neck.  She started minoxidil last week.  Patient has no other skin complaints today.  Remainder of the HPI, Meds, PMH, Allergies, FH, and SH was reviewed in chart.      Past Medical History:   Diagnosis Date    Basal cell carcinoma     Hypertension 06/07/2010    Macular degeneration     PONV (postoperative nausea and vomiting)     Pulmonary embolism (H)     Pulmonary embolism, bilateral (H) 02/19/2012    Post-surgical at [facility] following total knee replacement     Shingles outbreak 12/2016    Squamous cell carcinoma of skin, unspecified        Past Surgical History:   Procedure Laterality Date    basal cell cancer of nose  Oct 2012    BREAST SURGERY      breast reduction    CARPAL TUNNEL RELEASE RT/LT      right    COLONOSCOPY N/A 1/10/2023    Procedure: COLONOSCOPY with cold polypectomies;  Surgeon: Sachin Tan DO;  Location: [facility] OR    COMBINED REPAIR PTOSIS WITH BLEPHAROPLASTY BILATERAL Bilateral 6/30/2015    Procedure: COMBINED REPAIR PTOSIS WITH BLEPHAROPLASTY BILATERAL;  Surgeon: Ilir Marvin MD;  Location: [facility]    ESOPHAGOSCOPY, GASTROSCOPY, DUODENOSCOPY (EGD), COMBINED N/A 1/10/2023    Procedure: ESOPHAGOGASTRODUODENOSCOPY WITH biopsies and 45 Irish Savary Dilation;  Surgeon: Sachin Tan DO;  Location: [facility] OR    HC REMOVAL GALLBLADDER      LIGATE VEIN VARICOSE, PHLEBECTOMY MULTIPLE STAB, COMBINED Bilateral 8/13/2015    Procedure: COMBINED LIGATE VEIN VARICOSE, PHLEBECTOMY MULTIPLE STAB;  Surgeon: Alphonse Pro MD;  Location: [facility]    ORTHOPEDIC SURGERY      bilateral knee    PHACOEMULSIFICATION WITH STANDARD INTRAOCULAR LENS IMPLANT Right 12/21/2017    Procedure: PHACOEMULSIFICATION WITH STANDARD INTRAOCULAR LENS IMPLANT;  Right Cataract Removal with Implant;  Surgeon: Clif Michel MD;  Location: [facility]    SURGICAL HISTORY OF -        breast reduction mammoplasty     SURGICAL HISTORY OF -       bilateral lower extremity vein stripping    SURGICAL HISTORY OF -   2010    left total knee arthroplasty        Family History   Problem Relation Age of Onset    Diabetes Father         last both legs to the disease    Heart Disease Father          age 91       Social History     Socioeconomic History    Marital status:      Spouse name: Not on file    Number of children: Not on file    Years of education: Not on file    Highest education level: Not on file   Occupational History    Not on file   Tobacco Use    Smoking status: Former     Years: 20     Types: Cigarettes     Quit date: 1982     Years since quittin.7    Smokeless tobacco: Never   Vaping Use    Vaping Use: Never used   Substance and Sexual Activity    Alcohol use: Yes     Comment: occ wine    Drug use: No    Sexual activity: Not Currently   Other Topics Concern    Parent/sibling w/ CABG, MI or angioplasty before 65F 55M? No   Social History Narrative    Not on file     Social Determinants of Health     Financial Resource Strain: Not on file   Food Insecurity: Not on file   Transportation Needs: Not on file   Physical Activity: Not on file   Stress: Not on file   Social Connections: Not on file   Interpersonal Safety: Not on file   Housing Stability: Not on file       Outpatient Encounter Medications as of 2023   Medication Sig Dispense Refill    amLODIPine (NORVASC) 5 MG tablet TAKE 1 TABLET DAILY 90 tablet 1    apixaban ANTICOAGULANT (ELIQUIS) 5 MG tablet Take 1 tablet (5 mg) by mouth as needed (prior to long distance travel. Repeat dose 12 hours later if travel length > 12 hours.)      atorvastatin (LIPITOR) 40 MG tablet TAKE 1 TABLET DAILY 90 tablet 1    Cholecalciferol (VITAMIN D3) 25 MCG (1000 UT) CAPS Take 1 capsule by mouth daily      minoxidil (LONITEN) 2.5 MG tablet 1/4 tabelt daily 60 tablet 3    Multiple Vitamins-Minerals (HAIR SKIN AND NAILS  FORMULA PO) Take 2 chew tab by mouth      Multiple Vitamins-Minerals (MACULAR VITAMIN BENEFIT PO) Take 1 tablet by mouth daily Focus MaculaPro      valACYclovir (VALTREX) 1000 mg tablet 2 tabs at onset of cold sores. Repeat dose after 12 hours. 12 tablet 4     No facility-administered encounter medications on file as of 11/8/2023.             O:   NAD, WDWN, Alert & Oriented, Mood & Affect wnl, Vitals stable   General appearance normal   Vitals stable   Alert, oriented and in no acute distress     Inflamed seborrheic keratosis on neck    Stuck on papules and brown macules on trunk and ext   Red papules on trunk  Flesh colored papules on trunk         Eyes: Conjunctivae/lids:Normal     ENT: Lips, buccal mucosa, tongue: normal    MSK:Normal    Cardiovascular: peripheral edema none    Pulm: Breathing Normal    Neuro/Psych: Orientation:Alert and Orientedx3 ; Mood/Affect:normal       A/P:  1. Seborrheic keratosis, lentigo, angioma, dermal nevus  2. Inflamed seborrheic keratosis neck x14  LN2:  Treated with LN2 for 5s for 1-2 cycles. Warned risks of blistering, pain, pigment change, scarring, and incomplete resolution.  Advised patient to return if lesions do not completely resolve.  Wound care sheet given.  3. Androgenetic alopecia  Cont minoxidil  Return to clinic 4 months  It was a pleasure speaking to Marlene Millan today.  Previous clinic notes and pertinent laboratory tests were reviewed prior to Marlene Millan's visit.    Nature and genetics of benign skin lesions dicussed with patient.

## 2023-11-09 ENCOUNTER — PATIENT OUTREACH (OUTPATIENT)
Dept: FAMILY MEDICINE | Facility: CLINIC | Age: 83
End: 2023-11-09
Payer: COMMERCIAL

## 2024-02-19 ENCOUNTER — NURSE TRIAGE (OUTPATIENT)
Dept: FAMILY MEDICINE | Facility: CLINIC | Age: 84
End: 2024-02-19

## 2024-02-19 ENCOUNTER — OFFICE VISIT (OUTPATIENT)
Dept: PEDIATRICS | Facility: CLINIC | Age: 84
End: 2024-02-19
Payer: COMMERCIAL

## 2024-02-19 ENCOUNTER — HOSPITAL ENCOUNTER (OUTPATIENT)
Dept: ULTRASOUND IMAGING | Facility: CLINIC | Age: 84
Discharge: HOME | End: 2024-02-19
Attending: NURSE PRACTITIONER | Admitting: NURSE PRACTITIONER
Payer: COMMERCIAL

## 2024-02-19 VITALS
BODY MASS INDEX: 28.1 KG/M2 | RESPIRATION RATE: 16 BRPM | WEIGHT: 164.6 LBS | SYSTOLIC BLOOD PRESSURE: 187 MMHG | OXYGEN SATURATION: 98 % | DIASTOLIC BLOOD PRESSURE: 87 MMHG | HEIGHT: 64 IN | HEART RATE: 80 BPM | TEMPERATURE: 98.2 F

## 2024-02-19 DIAGNOSIS — M79.661 PAIN OF RIGHT LOWER LEG: Primary | ICD-10-CM

## 2024-02-19 DIAGNOSIS — M79.661 PAIN OF RIGHT LOWER LEG: ICD-10-CM

## 2024-02-19 PROCEDURE — 93971 EXTREMITY STUDY: CPT | Mod: RT

## 2024-02-19 PROCEDURE — 99213 OFFICE O/P EST LOW 20 MIN: CPT | Performed by: NURSE PRACTITIONER

## 2024-02-19 ASSESSMENT — PAIN SCALES - GENERAL: PAINLEVEL: NO PAIN (0)

## 2024-02-19 NOTE — TELEPHONE ENCOUNTER
If she is concerned that this feels like a blood clot, she should not wait for March apt for evaluation.    Daxa Osborn M.D.

## 2024-02-19 NOTE — TELEPHONE ENCOUNTER
Provider Recommendation Follow Up:   [facility] scheduled patient to be evaluated today to rule out DVT.        Aylin Fonseca, RN on 2/19/2024 at 3:31 PM

## 2024-02-19 NOTE — TELEPHONE ENCOUNTER
Nurse Triage SBAR    Is this a 2nd Level Triage? YES, LICENSED PRACTITIONER REVIEW IS REQUIRED    Situation:  Past 3 days patient reports right back of the knee pain that feels achy     Background:   Patient reports she fell a few weeks ago and pain started 3 days ago. Patient had bilateral total knee replacements and had a blood clot after one of the knee replacements with [facility] ortho.    Assessment:   Today pain is mild and was initially moderate 3 days ago.  Denies swelling, redness, denies shortness of breath, fever or change in temperature of the right posterior knee.     Protocol Recommended Disposition:   See in Office Within 3 Days, See More Appropriate Protocol  Patient only wants  to be evaluated by Dr. Rodriguez and wants to only be scheduled for her annual visit. RN scheduled patient 3/13/24.  Recommendation:     Please evaluate if it is ok for patient to wait until 3/13/24. Patient advised to go to ED if pain worsens, signs of blood clot, or new issues occur.     Routed to provider    Does the patient meet one of the following criteria for ADS visit consideration? 16+ years old, with an [facility] PCP     TIP  Providers, please consider if this condition is appropriate for management at one of our Acute and Diagnostic Services sites.     If patient is a good candidate, please use dotphrase <dot>triageresponse and select Refer to ADS to document.            Reason for Disposition   Knee pain is main symptom   MODERATE pain (e.g., symptoms interfere with work or school, limping) and present > 3 days    Additional Information   Negative: Looks like a broken bone or dislocated joint (e.g., crooked or deformed)   Negative: Sounds like a life-threatening emergency to the triager   Negative: Sounds like a life-threatening emergency to the triager   Negative: Followed a knee injury   Negative: Swollen knee joint and fever   Negative: Patient sounds very sick or weak to the triager   Negative: Can't move swollen  "joint at all   Negative: Thigh or calf pain and only 1 side and present > 1 hour   Negative: Thigh or calf swelling and only 1 side   Negative: SEVERE pain (e.g., excruciating, unable to walk)   Negative: Very swollen knee joint   Negative: Painful rash with multiple small blisters grouped together (i.e., dermatomal distribution or 'band' or 'stripe')   Negative: Looks like a boil, infected sore, deep ulcer, or other infected rash (spreading redness, pus)    Answer Assessment - Initial Assessment Questions  1. LOCATION and RADIATION: \"Where is the pain located?\"       Back of right knee  2. QUALITY: \"What does the pain feel like?\"  (e.g., sharp, dull, aching, burning)      ache  3. SEVERITY: \"How bad is the pain?\" \"What does it keep you from doing?\"   (Scale 1-10; or mild, moderate, severe)    -  MILD (1-3): doesn't interfere with normal activities     -  MODERATE (4-7): interferes with normal activities (e.g., work or school) or awakens from sleep, limping     -  SEVERE (8-10): excruciating pain, unable to do any normal activities, unable to walk      Mild to moderate; right now mild pain  4. ONSET: \"When did the pain start?\" \"Does it come and go, or is it there all the time?\"      3 days ago  5. RECURRENT: \"Have you had this pain before?\" If Yes, ask: \"When, and what happened then?\"      no  6. SETTING: \"Has there been any recent work, exercise or other activity that involved that part of the body?\"       Fell a few weeks ago  7. AGGRAVATING FACTORS: \"What makes the knee pain worse?\" (e.g., walking, climbing stairs, running)      no  8. ASSOCIATED SYMPTOMS: \"Is there any swelling or redness of the knee?\"      no  9. OTHER SYMPTOMS: \"Do you have any other symptoms?\" (e.g., chest pain, difficulty breathing, fever, calf pain)      no    History of blood clots, not on blood thinners, total knee replacements bilaterally    Protocols used: Leg Pain-A-OH, Knee Pain-A-OH    "

## 2024-02-19 NOTE — PATIENT INSTRUCTIONS
No blood clot seen today.  Recommend warm packs, gentle stretching.  If not improving follow up with Dr. Rodriguez.

## 2024-02-19 NOTE — PROGRESS NOTES
"  Assessment & Plan     Pain of right lower leg  Referred to ADS for right leg pain, hx of DVT, PE. Pain onset 2 days ago. No recent immobilization, procedures, travel. Given history, stat US obtained to r/o DVT. This is negative. Likely strain. Recommend conservative care, heat, Tylenol, stretching. PCP follow up if not improving.  - US Lower Extremity Venous Duplex Right; Future    BMI  Estimated body mass index is 28.25 kg/m  as calculated from the following:    Height as of this encounter: 1.626 m (5' 4\").    Weight as of this encounter: 74.7 kg (164 lb 9.6 oz).         See Patient Instructions    Return in about 1 week (around 2/26/2024) for worsening or continued symptoms.    Pablo Weller is a 83 year old, presenting for the following health issues:  rule out dvt (Right leg behind knee)    HPI     Evaluation for possible DVT  Onset/Duration: Friday.   Description: Has on/off pain behind right knee       Location: right leg behind knee       Redness: no        Pain: mild       Warmth: no        Joint swelling no   Progression of symptoms same intermittent  Accompanying signs and symptoms:       Fevers: no        Numbness/tingling/weakness: no        Chest pain/pleurisy: no        Shortness of breath: no   History        Trauma: YES- Poss 2 weeks ago fell while walking dog        Recent travel/when: no         Previous history of DVT: YES        Family history of DVT: no         Recent surgery: no   Aggravating factors include: none  Therapies tried and outcome: nothing  Prior surgery on arteries of veins in this area: Yes, date unsure maybe 1980      Above HPI reviewed. Additionally, past medical history significant for DVT, PE, she is currently not taking any anticoagulation, it was recommended that should she travel, she should take apixaban prior to long travel or immobilization.  2 days ago, she developed right posterior leg pain, mostly isolated to the popliteal fossa.  She has not had any injury to " "her leg, however she did have a fall approximately 2 weeks ago while she was walking her dog after tripping on uneven pavement.  She denies any leg pain related to that.  She denies any significant immobilization.  She has not had any recent travel.  She denies chest pain, shortness of breath.        Review of Systems  Constitutional, HEENT, cardiovascular, pulmonary, gi and gu systems are negative, except as otherwise noted.      Objective    BP (!) 176/90 (BP Location: Right arm, Patient Position: Sitting, Cuff Size: Adult Regular)   Pulse 70   Temp 98.2  F (36.8  C) (Oral)   Resp 16   Ht 1.626 m (5' 4\")   Wt 74.7 kg (164 lb 9.6 oz)   SpO2 98%   BMI 28.25 kg/m    Body mass index is 28.25 kg/m .  Physical Exam  Vitals and nursing note reviewed.   Constitutional:       General: She is not in acute distress.     Appearance: Normal appearance.   HENT:      Head: Normocephalic and atraumatic.      Mouth/Throat:      Mouth: Mucous membranes are moist.   Cardiovascular:      Rate and Rhythm: Normal rate.   Pulmonary:      Effort: Pulmonary effort is normal.   Musculoskeletal:      Cervical back: Neck supple.      Comments: Bilateral lower extremities are without any significant edema.  Right leg without erythema, calf tenderness to palpation.  She does have mild tenderness to palpation of the popliteal fossa and a positive Homans' sign.   Skin:     General: Skin is warm and dry.   Neurological:      General: No focal deficit present.      Mental Status: She is alert.   Psychiatric:         Mood and Affect: Mood normal.         Behavior: Behavior normal.                    Signed Electronically by: NOBLE Hayes CNP    "

## 2024-02-19 NOTE — TELEPHONE ENCOUNTER
Symptoms    Describe your symptoms: In the back of pt's right leg, behind knee and thigh, it has been painful X 3 days.    It is not warm to the touch.  Pt has been taking an aspirin a day, in case it's a blood clot.  Please call patient and advise.      Any pain: Yes:     How long have you been having symptoms: 3  days    Have you been seen for this:  No    Appointment offered?: No    Triage offered?: Yes:     Home remedies tried:     Preferred Pharmacy:   [facility] [place], [place] - [address] [address] [facility] [address]  Phone: [phone] Fax: [phone] Alternate Fax: [phone]    Could we send this information to you in [employer] or would you prefer to receive a phone call?:   Patient would prefer a phone call   Okay to leave a detailed message?: Yes at Cell number on file:    Telephone Information:   Mobile [phone]

## 2024-02-21 DIAGNOSIS — I10 ESSENTIAL HYPERTENSION: ICD-10-CM

## 2024-02-21 RX ORDER — AMLODIPINE BESYLATE 5 MG/1
5 TABLET ORAL DAILY
Qty: 90 TABLET | Refills: 0 | Status: SHIPPED | OUTPATIENT
Start: 2024-02-21 | End: 2024-03-13

## 2024-02-21 NOTE — TELEPHONE ENCOUNTER
Pending Prescriptions:                       Disp   Refills    amLODIPine (NORVASC) 5 MG tablet          90 tab*1            Sig: Take 1 tablet (5 mg) by mouth daily    Routing refill request to provider for review/approval because:  Creatinine   Date Value Ref Range Status   10/21/2022 0.85 0.51 - 0.95 mg/dL Final   10/21/2020 0.91 0.52 - 1.04 mg/dL Final     Jose Kearney RN

## 2024-02-22 ENCOUNTER — TRANSFERRED RECORDS (OUTPATIENT)
Dept: HEALTH INFORMATION MANAGEMENT | Facility: CLINIC | Age: 84
End: 2024-02-22
Payer: COMMERCIAL

## 2024-03-13 ENCOUNTER — LAB (OUTPATIENT)
Dept: LAB | Facility: CLINIC | Age: 84
End: 2024-03-13
Payer: COMMERCIAL

## 2024-03-13 ENCOUNTER — OFFICE VISIT (OUTPATIENT)
Dept: FAMILY MEDICINE | Facility: CLINIC | Age: 84
End: 2024-03-13
Payer: COMMERCIAL

## 2024-03-13 VITALS
TEMPERATURE: 98 F | BODY MASS INDEX: 27.49 KG/M2 | DIASTOLIC BLOOD PRESSURE: 86 MMHG | SYSTOLIC BLOOD PRESSURE: 134 MMHG | RESPIRATION RATE: 16 BRPM | HEART RATE: 66 BPM | WEIGHT: 161 LBS | OXYGEN SATURATION: 99 % | HEIGHT: 64 IN

## 2024-03-13 DIAGNOSIS — E78.5 HYPERLIPIDEMIA LDL GOAL <130: ICD-10-CM

## 2024-03-13 DIAGNOSIS — I26.99 ACUTE PULMONARY EMBOLISM WITHOUT ACUTE COR PULMONALE, UNSPECIFIED PULMONARY EMBOLISM TYPE (H): ICD-10-CM

## 2024-03-13 DIAGNOSIS — R53.83 OTHER FATIGUE: ICD-10-CM

## 2024-03-13 DIAGNOSIS — Z00.00 ENCOUNTER FOR MEDICARE ANNUAL WELLNESS EXAM: Primary | ICD-10-CM

## 2024-03-13 DIAGNOSIS — B37.0 THRUSH: ICD-10-CM

## 2024-03-13 DIAGNOSIS — I10 ESSENTIAL HYPERTENSION: ICD-10-CM

## 2024-03-13 DIAGNOSIS — R74.8 ABNORMAL CREATINE KINASE LEVEL: ICD-10-CM

## 2024-03-13 LAB
ANION GAP SERPL CALCULATED.3IONS-SCNC: 8 MMOL/L (ref 7–15)
BASOPHILS # BLD AUTO: 0 10E3/UL (ref 0–0.2)
BASOPHILS NFR BLD AUTO: 0 %
BUN SERPL-MCNC: 29.3 MG/DL (ref 8–23)
CALCIUM SERPL-MCNC: 9.8 MG/DL (ref 8.8–10.2)
CHLORIDE SERPL-SCNC: 101 MMOL/L (ref 98–107)
CREAT SERPL-MCNC: 1.03 MG/DL (ref 0.51–0.95)
DEPRECATED HCO3 PLAS-SCNC: 30 MMOL/L (ref 22–29)
EGFRCR SERPLBLD CKD-EPI 2021: 54 ML/MIN/1.73M2
EOSINOPHIL # BLD AUTO: 0.1 10E3/UL (ref 0–0.7)
EOSINOPHIL NFR BLD AUTO: 2 %
ERYTHROCYTE [DISTWIDTH] IN BLOOD BY AUTOMATED COUNT: 14 % (ref 10–15)
GLUCOSE SERPL-MCNC: 111 MG/DL (ref 70–99)
HCT VFR BLD AUTO: 41.5 % (ref 35–47)
HGB BLD-MCNC: 13.3 G/DL (ref 11.7–15.7)
IMM GRANULOCYTES # BLD: 0 10E3/UL
IMM GRANULOCYTES NFR BLD: 0 %
LYMPHOCYTES # BLD AUTO: 1.9 10E3/UL (ref 0.8–5.3)
LYMPHOCYTES NFR BLD AUTO: 27 %
MCH RBC QN AUTO: 28.9 PG (ref 26.5–33)
MCHC RBC AUTO-ENTMCNC: 32 G/DL (ref 31.5–36.5)
MCV RBC AUTO: 90 FL (ref 78–100)
MONOCYTES # BLD AUTO: 0.6 10E3/UL (ref 0–1.3)
MONOCYTES NFR BLD AUTO: 9 %
NEUTROPHILS # BLD AUTO: 4.4 10E3/UL (ref 1.6–8.3)
NEUTROPHILS NFR BLD AUTO: 62 %
PLATELET # BLD AUTO: 301 10E3/UL (ref 150–450)
POTASSIUM SERPL-SCNC: 4.6 MMOL/L (ref 3.4–5.3)
RBC # BLD AUTO: 4.6 10E6/UL (ref 3.8–5.2)
SODIUM SERPL-SCNC: 139 MMOL/L (ref 135–145)
TSH SERPL DL<=0.005 MIU/L-ACNC: 2.38 UIU/ML (ref 0.3–4.2)
WBC # BLD AUTO: 7 10E3/UL (ref 4–11)

## 2024-03-13 PROCEDURE — 36415 COLL VENOUS BLD VENIPUNCTURE: CPT

## 2024-03-13 PROCEDURE — 82607 VITAMIN B-12: CPT

## 2024-03-13 PROCEDURE — 80048 BASIC METABOLIC PNL TOTAL CA: CPT

## 2024-03-13 PROCEDURE — 99214 OFFICE O/P EST MOD 30 MIN: CPT | Mod: 25 | Performed by: FAMILY MEDICINE

## 2024-03-13 PROCEDURE — 85025 COMPLETE CBC W/AUTO DIFF WBC: CPT

## 2024-03-13 PROCEDURE — G0439 PPPS, SUBSEQ VISIT: HCPCS | Performed by: FAMILY MEDICINE

## 2024-03-13 PROCEDURE — 84443 ASSAY THYROID STIM HORMONE: CPT

## 2024-03-13 RX ORDER — AMLODIPINE BESYLATE 5 MG/1
5 TABLET ORAL DAILY
Qty: 90 TABLET | Refills: 3 | Status: SHIPPED | OUTPATIENT
Start: 2024-03-13

## 2024-03-13 RX ORDER — RESPIRATORY SYNCYTIAL VIRUS VACCINE 120MCG/0.5
0.5 KIT INTRAMUSCULAR ONCE
Qty: 1 EACH | Refills: 0 | Status: CANCELLED | OUTPATIENT
Start: 2024-03-13 | End: 2024-03-13

## 2024-03-13 RX ORDER — NYSTATIN 100000/ML
500000 SUSPENSION, ORAL (FINAL DOSE FORM) ORAL 4 TIMES DAILY
Qty: 200 ML | Refills: 0 | Status: SHIPPED | OUTPATIENT
Start: 2024-03-13 | End: 2024-03-23

## 2024-03-13 RX ORDER — ATORVASTATIN CALCIUM 40 MG/1
40 TABLET, FILM COATED ORAL DAILY
Qty: 90 TABLET | Refills: 3 | Status: SHIPPED | OUTPATIENT
Start: 2024-03-13

## 2024-03-13 SDOH — HEALTH STABILITY: PHYSICAL HEALTH: ON AVERAGE, HOW MANY DAYS PER WEEK DO YOU ENGAGE IN MODERATE TO STRENUOUS EXERCISE (LIKE A BRISK WALK)?: 7 DAYS

## 2024-03-13 SDOH — HEALTH STABILITY: PHYSICAL HEALTH: ON AVERAGE, HOW MANY MINUTES DO YOU ENGAGE IN EXERCISE AT THIS LEVEL?: 30 MIN

## 2024-03-13 ASSESSMENT — SOCIAL DETERMINANTS OF HEALTH (SDOH): HOW OFTEN DO YOU GET TOGETHER WITH FRIENDS OR RELATIVES?: TWICE A WEEK

## 2024-03-13 ASSESSMENT — PAIN SCALES - GENERAL: PAINLEVEL: NO PAIN (0)

## 2024-03-13 NOTE — PROGRESS NOTES
"Preventive Care Visit  [facility]  Katy Rodriguez MD, Family Medicine  Mar 13, 2024      Assessment & Plan     Encounter for Medicare annual wellness exam    Hyperlipidemia LDL goal <130  Well controlled. Refilled medication. Check labs.    - atorvastatin (LIPITOR) 40 MG tablet; Take 1 tablet (40 mg) by mouth daily    Thrush  - nystatin (MYCOSTATIN) 136969 UNIT/ML suspension; Take 5 mLs (500,000 Units) by mouth 4 times daily for 10 days    Essential hypertension  Well controlled. Refilled medication.     - amLODIPine (NORVASC) 5 MG tablet; Take 1 tablet (5 mg) by mouth daily    Other fatigue  Discussed often multi-factorial etiology of fatigue. Will initiate further work-up with labs as below. Further work-up and management as dictated by results.   - CBC with Platelets & Differential; Future  - Basic metabolic panel; Future  - TSH with free T4 reflex; Future  - Vitamin B12; Future    Known higher risk conditions that I take into account for medical decision making:   Acute pulmonary embolism without acute cor pulmonale, unspecified pulmonary embolism type (H)  Stable on eliquis    Patient has been advised of split billing requirements and indicates understanding: Yes          BMI  Estimated body mass index is 27.64 kg/m  as calculated from the following:    Height as of this encounter: 1.626 m (5' 4\").    Weight as of this encounter: 73 kg (161 lb).       Counseling  Appropriate preventive services were discussed with this patient, including applicable screening as appropriate for fall prevention, nutrition, physical activity, Tobacco-use cessation, weight loss and cognition.  Checklist reviewing preventive services available has been given to the patient.  Reviewed patient's diet, addressing concerns and/or questions.   Discussed possible causes of fatigue. The patient was provided with written information regarding signs of hearing loss.           Subjective   Janee is a 83 " year old, presenting for the following:  Wellness Visit        3/13/2024    10:11 AM   Additional Questions   Roomed by Giselle REESE CMA   Accompanied by self         Health Care Directive  Patient has a Health Care Directive on file      HPI              3/13/2024   General Health   How would you rate your overall physical health? Good   Feel stress (tense, anxious, or unable to sleep) Not at all         3/13/2024   Nutrition   Diet: Regular (no restrictions)         3/13/2024   Exercise   Days per week of moderate/strenous exercise 7 days   Average minutes spent exercising at this level 30 min         3/13/2024   Social Factors   Frequency of gathering with friends or relatives Twice a week   Worry food won't last until get money to buy more No   Food not last or not have enough money for food? No   Do you have housing?  Yes   Are you worried about losing your housing? No   Lack of transportation? No   Unable to get utilities (heat,electricity)? No         3/13/2024   Activities of Daily Living- Home Safety   Needs help with the following daily activites None of the above   Safety concerns in the home None of the above         3/13/2024   Dental   Dentist two times every year? Yes         3/13/2024   Hearing Screening   Hearing concerns? (!) I NEED TO ASK PEOPLE TO SPEAK UP OR REPEAT THEMSELVES.         3/13/2024   Driving Risk Screening   Patient/family members have concerns about driving No         3/13/2024   General Alertness/Fatigue Screening   Have you been more tired than usual lately? (!) YES         3/13/2024   Urinary Incontinence Screening   Bothered by leaking urine in past 6 months No         3/13/2024   TB Screening   Were you born outside of [place]?  No         Today's PHQ-2 Score:       3/13/2024    10:20 AM   PHQ-2 ( 1999 [employer])   Q1: Little interest or pleasure in doing things 0   Q2: Feeling down, depressed or hopeless 0   PHQ-2 Score 0   Q1: Little interest or pleasure in doing things Not at all    Q2: Feeling down, depressed or hopeless Not at all   PHQ-2 Score 0           3/13/2024   Substance Use   Alcohol more than 3/day or more than 7/wk No   Do you have a current opioid prescription? No   How severe/bad is pain from 1 to 10? 0/10 (No Pain)   Do you use any other substances recreationally? No     Social History     Tobacco Use    Smoking status: Former     Years: 20     Types: Cigarettes     Quit date: 1982     Years since quittin.1    Smokeless tobacco: Never   Vaping Use    Vaping Use: Never used   Substance Use Topics    Alcohol use: Yes     Comment: occ wine    Drug use: No           2022   LAST FHS-7 RESULTS   1st degree relative breast or ovarian cancer No   Any relative bilateral breast cancer No   Any male have breast cancer No   Any ONE woman have BOTH breast AND ovarian cancer No   Any woman with breast cancer before 50yrs No   2 or more relatives with breast AND/OR ovarian cancer No   2 or more relatives with breast AND/OR bowel cancer No        Mammogram Screening - After age 74- determine frequency with patient based on health status, life expectancy and patient goals              Reviewed and updated as needed this visit by Provider   Tobacco  Allergies  Meds  Problems  Med Hx  Surg Hx  Fam Hx            Past Medical History:   Diagnosis Date    Basal cell carcinoma     Hypertension 2010    Macular degeneration     PONV (postoperative nausea and vomiting)     Pulmonary embolism (H)     Pulmonary embolism, bilateral (H) 2012    Post-surgical at [facility] following total knee replacement     Shingles outbreak 2016    Squamous cell carcinoma of skin, unspecified      Past Surgical History:   Procedure Laterality Date    basal cell cancer of nose  Oct 2012    BREAST SURGERY      breast reduction    CARPAL TUNNEL RELEASE RT/LT      right    COLONOSCOPY N/A 1/10/2023    Procedure: COLONOSCOPY with cold polypectomies;  Surgeon: Sachin Tan DO;   Location: [facility] OR    COMBINED REPAIR PTOSIS WITH BLEPHAROPLASTY BILATERAL Bilateral 6/30/2015    Procedure: COMBINED REPAIR PTOSIS WITH BLEPHAROPLASTY BILATERAL;  Surgeon: Ilir Marvin MD;  Location: [facility]    ESOPHAGOSCOPY, GASTROSCOPY, DUODENOSCOPY (EGD), COMBINED N/A 1/10/2023    Procedure: ESOPHAGOGASTRODUODENOSCOPY WITH biopsies and 45 Turkmen Savary Dilation;  Surgeon: Sachin Tan DO;  Location: [facility] OR    HC REMOVAL GALLBLADDER      LIGATE VEIN VARICOSE, PHLEBECTOMY MULTIPLE STAB, COMBINED Bilateral 8/13/2015    Procedure: COMBINED LIGATE VEIN VARICOSE, PHLEBECTOMY MULTIPLE STAB;  Surgeon: Alphonse Pro MD;  Location: [facility] OR    ORTHOPEDIC SURGERY      bilateral knee    PHACOEMULSIFICATION WITH STANDARD INTRAOCULAR LENS IMPLANT Right 12/21/2017    Procedure: PHACOEMULSIFICATION WITH STANDARD INTRAOCULAR LENS IMPLANT;  Right Cataract Removal with Implant;  Surgeon: Clif Michel MD;  Location: [facility] OR    SURGICAL HISTORY OF -       breast reduction mammoplasty 1990s    SURGICAL HISTORY OF -       bilateral lower extremity vein stripping    SURGICAL HISTORY OF -   2/22/2010    left total knee arthroplasty     Labs reviewed in EPIC  Patient Active Problem List   Diagnosis    Macular degeneration    Spinal stenosis, lumbar region, without neurogenic claudication    Essential hypertension    HYPERLIPIDEMIA LDL GOAL <130    GERD (gastroesophageal reflux disease)    Patent foramen ovale    Advanced directives, counseling/discussion    [facility]    ASD (atrial septal defect)    Senile nuclear cataract    Acute pulmonary embolism without acute cor pulmonale, unspecified pulmonary embolism type (H)    HSV (herpes simplex virus) infection    Pain in right upper arm    Acute pain of right shoulder    Traumatic tear of right rotator cuff, unspecified tear extent, subsequent encounter     Past Surgical History:   Procedure Laterality Date    basal cell cancer of nose  Oct      BREAST SURGERY      breast reduction    CARPAL TUNNEL RELEASE RT/LT      right    COLONOSCOPY N/A 1/10/2023    Procedure: COLONOSCOPY with cold polypectomies;  Surgeon: Sachin Tan DO;  Location: [facility] OR    COMBINED REPAIR PTOSIS WITH BLEPHAROPLASTY BILATERAL Bilateral 2015    Procedure: COMBINED REPAIR PTOSIS WITH BLEPHAROPLASTY BILATERAL;  Surgeon: Ilir Marvin MD;  Location: [facility]    ESOPHAGOSCOPY, GASTROSCOPY, DUODENOSCOPY (EGD), COMBINED N/A 1/10/2023    Procedure: ESOPHAGOGASTRODUODENOSCOPY WITH biopsies and 45 Nigerien Savary Dilation;  Surgeon: Sachin Tan DO;  Location: [facility] OR    HC REMOVAL GALLBLADDER      LIGATE VEIN VARICOSE, PHLEBECTOMY MULTIPLE STAB, COMBINED Bilateral 2015    Procedure: COMBINED LIGATE VEIN VARICOSE, PHLEBECTOMY MULTIPLE STAB;  Surgeon: Alphonse Pro MD;  Location: [facility]    ORTHOPEDIC SURGERY      bilateral knee    PHACOEMULSIFICATION WITH STANDARD INTRAOCULAR LENS IMPLANT Right 2017    Procedure: PHACOEMULSIFICATION WITH STANDARD INTRAOCULAR LENS IMPLANT;  Right Cataract Removal with Implant;  Surgeon: Clif Michel MD;  Location: [facility]    SURGICAL HISTORY OF -       breast reduction mammoplasty     SURGICAL HISTORY OF -       bilateral lower extremity vein stripping    SURGICAL HISTORY OF -   2010    left total knee arthroplasty       Social History     Tobacco Use    Smoking status: Former     Years: 20     Types: Cigarettes     Quit date: 1982     Years since quittin.1    Smokeless tobacco: Never   Substance Use Topics    Alcohol use: Yes     Comment: occ wine     Family History   Problem Relation Age of Onset    Diabetes Father         last both legs to the disease    Heart Disease Father          age 91         Current Outpatient Medications   Medication Sig Dispense Refill    amLODIPine (NORVASC) 5 MG tablet Take 1 tablet (5 mg) by mouth daily 90 tablet 3    apixaban ANTICOAGULANT  (ELIQUIS) 5 MG tablet Take 5 mg by mouth as needed (prior to long distance travel. Repeat dose 12 hours later if travel length > 12 hours.)      atorvastatin (LIPITOR) 40 MG tablet Take 1 tablet (40 mg) by mouth daily 90 tablet 3    Cholecalciferol (VITAMIN D3) 25 MCG (1000 UT) CAPS Take 1 capsule by mouth daily      minoxidil (LONITEN) 2.5 MG tablet 1/4 tabelt daily 60 tablet 3    Multiple Vitamins-Minerals (HAIR SKIN AND NAILS FORMULA PO) Take 2 chew tab by mouth      Multiple Vitamins-Minerals (MACULAR VITAMIN BENEFIT PO) Take 1 tablet by mouth daily Focus MaculaPro      nystatin (MYCOSTATIN) 647034 UNIT/ML suspension Take 5 mLs (500,000 Units) by mouth 4 times daily for 10 days 200 mL 0    valACYclovir (VALTREX) 1000 mg tablet 2 tabs at onset of cold sores. Repeat dose after 12 hours. 12 tablet 4     Allergies   Allergen Reactions    Erythromycin Itching    Sulfa Antibiotics Visual Disturbance     Affected eyesight     Current providers sharing in care for this patient include:  Patient Care Team:  Katy Rodriguez MD as PCP - General (Family Practice)  Katy Rodriguez MD as Assigned PCP  Errol Anderson MD as MD (Dermatology)  Maddi Gray PA-C as Assigned Cancer Care Provider  Errol Anderson MD as Assigned Surgical Provider    The following health maintenance items are reviewed in Epic and correct as of today:  Health Maintenance   Topic Date Due    RSV VACCINE (Pregnancy & 60+) (1 - 1-dose 60+ series) Never done    COVID-19 Vaccine (5 - 2023-24 season) 09/01/2023    LIPID  10/21/2023    ANNUAL REVIEW OF HM ORDERS  10/05/2024    MEDICARE ANNUAL WELLNESS VISIT  03/13/2025    FALL RISK ASSESSMENT  03/13/2025    COLORECTAL CANCER SCREENING  01/10/2026    ADVANCE CARE PLANNING  04/13/2027    DTAP/TDAP/TD IMMUNIZATION (2 - Td or Tdap) 12/13/2027    DEXA  11/18/2035    PHQ-2 (once per calendar year)  Completed    INFLUENZA VACCINE  Completed    Pneumococcal Vaccine: 65+  "Years  Completed    ZOSTER IMMUNIZATION  Completed    IPV IMMUNIZATION  Aged Out    HPV IMMUNIZATION  Aged Out    MENINGITIS IMMUNIZATION  Aged Out    RSV MONOCLONAL ANTIBODY  Aged Out    LUNG CANCER SCREENING  Discontinued         Review of Systems  Constitutional, neuro, ENT, endocrine, pulmonary, cardiac, gastrointestinal, genitourinary, musculoskeletal, integument and psychiatric systems are negative, except as otherwise noted.     Objective    Exam  /86   Pulse 66   Temp 98  F (36.7  C) (Tympanic)   Resp 16   Ht 1.626 m (5' 4\")   Wt 73 kg (161 lb)   SpO2 99%   BMI 27.64 kg/m     Estimated body mass index is 27.64 kg/m  as calculated from the following:    Height as of this encounter: 1.626 m (5' 4\").    Weight as of this encounter: 73 kg (161 lb).    Physical Exam  GENERAL: alert and no distress  EYES: Eyes grossly normal to inspection, PERRL and conjunctivae and sclerae normal  HENT: normal cephalic/atraumatic and ear canals and TM's normal  NECK: no adenopathy, no asymmetry, masses, or scars  RESP: lungs clear to auscultation - no rales, rhonchi or wheezes  CV: regular rate and rhythm, normal S1 S2, no S3 or S4, no murmur, click or rub, no peripheral edema  ABDOMEN: soft, nontender, no hepatosplenomegaly, no masses and bowel sounds normal  MS: no gross musculoskeletal defects noted, no edema  SKIN: no suspicious lesions or rashes  NEURO: Normal strength and tone, mentation intact and speech normal  PSYCH: mentation appears normal, affect normal/bright        3/13/2024   Mini Cog   Clock Draw Score 2 Normal   3 Item Recall 3 objects recalled   Mini Cog Total Score 5              Signed Electronically by: Katy Rodriguez MD    "

## 2024-03-13 NOTE — NURSING NOTE
"Initial /86   Pulse 66   Temp 98  F (36.7  C) (Tympanic)   Resp 16   Ht 1.626 m (5' 4\")   Wt 73 kg (161 lb)   SpO2 99%   BMI 27.64 kg/m   Estimated body mass index is 27.64 kg/m  as calculated from the following:    Height as of this encounter: 1.626 m (5' 4\").    Weight as of this encounter: 73 kg (161 lb). .    "

## 2024-03-14 LAB — VIT B12 SERPL-MCNC: 626 PG/ML (ref 232–1245)

## 2024-03-20 ENCOUNTER — ANCILLARY PROCEDURE (OUTPATIENT)
Dept: MAMMOGRAPHY | Facility: CLINIC | Age: 84
End: 2024-03-20
Attending: FAMILY MEDICINE
Payer: COMMERCIAL

## 2024-03-20 DIAGNOSIS — Z12.31 VISIT FOR SCREENING MAMMOGRAM: ICD-10-CM

## 2024-03-20 PROCEDURE — 77067 SCR MAMMO BI INCL CAD: CPT | Mod: TC | Performed by: RADIOLOGY

## 2024-03-20 PROCEDURE — 77063 BREAST TOMOSYNTHESIS BI: CPT | Mod: TC | Performed by: RADIOLOGY

## 2024-03-20 NOTE — PATIENT INSTRUCTIONS
Preventive Care Advice   This is general advice given by our system to help you stay healthy. However, your care team may have specific advice just for you. Please talk to your care team about your preventive care needs.  Nutrition  Eat 5 or more servings of fruits and vegetables each day.  Try wheat bread, brown rice and whole grain pasta (instead of white bread, rice, and pasta).  Get enough calcium and vitamin D. Check the label on foods and aim for 100% of the RDA (recommended daily allowance).  Lifestyle  Exercise at least 150 minutes each week   (30 minutes a day, 5 days a week).  Do muscle strengthening activities 2 days a week. These help control your weight and prevent disease.  No smoking.  Wear sunscreen to prevent skin cancer.  Have a dental exam and cleaning every 6 months.  Yearly exams  See your health care team every year to talk about:  Any changes in your health.  Any medicines your care team has prescribed.  Preventive care, family planning, and ways to prevent chronic diseases.  Shots (vaccines)   HPV shots (up to age 26), if you've never had them before.  Hepatitis B shots (up to age 59), if you've never had them before.  COVID-19 shot: Get this shot when it's due.  Flu shot: Get a flu shot every year.  Tetanus shot: Get a tetanus shot every 10 years.  Pneumococcal, hepatitis A, and RSV shots: Ask your care team if you need these based on your risk.  Shingles shot (for age 50 and up).  General health tests  Diabetes screening:  Starting at age 35, Get screened for diabetes at least every 3 years.  If you are younger than age 35, ask your care team if you should be screened for diabetes.  Cholesterol test: At age 39, start having a cholesterol test every 5 years, or more often if advised.  Bone density scan (DEXA): At age 50, ask your care team if you should have this scan for osteoporosis (brittle bones).  Hepatitis C: Get tested at least once in your life.  STIs (sexually transmitted  infections)  Before age 24: Ask your care team if you should be screened for STIs.  After age 24: Get screened for STIs if you're at risk. You are at risk for STIs (including HIV) if:  You are sexually active with more than one person.  You don't use condoms every time.  You or a partner was diagnosed with a sexually transmitted infection.  If you are at risk for HIV, ask about PrEP medicine to prevent HIV.  Get tested for HIV at least once in your life, whether you are at risk for HIV or not.  Cancer screening tests  Cervical cancer screening: If you have a cervix, begin getting regular cervical cancer screening tests at age 21. Most people who have regular screenings with normal results can stop after age 65. Talk about this with your provider.  Breast cancer scan (mammogram): If you've ever had breasts, begin having regular mammograms starting at age 40. This is a scan to check for breast cancer.  Colon cancer screening: It is important to start screening for colon cancer at age 45.  Have a colonoscopy test every 10 years (or more often if you're at risk) Or, ask your provider about stool tests like a FIT test every year or Cologuard test every 3 years.  To learn more about your testing options, visit: https://www.[employer]/978179.pdf.  For help making a decision, visit: https://bit.ly/hm55759.  Prostate cancer screening test: If you have a prostate and are age 55 to 69, ask your provider if you would benefit from a yearly prostate cancer screening test.  Lung cancer screening: If you are a current or former smoker age 50 to 80, ask your care team if ongoing lung cancer screenings are right for you.  For informational purposes only. Not to replace the advice of your health care provider. Copyright   2023 [employer]. All rights reserved. Clinically reviewed by the [employer] [facility] Transitions Program. [employer] 965712 - REV 01/24.    Hearing Loss: Care Instructions  Overview     Hearing loss is a  sudden or slow decrease in how well you hear. It can range from slight to profound. Permanent hearing loss can occur with aging. It also can happen when you are exposed long-term to loud noise. Examples include listening to loud music, riding motorcycles, or being around other loud machines.  Hearing loss can affect your work and home life. It can make you feel lonely or depressed. You may feel that you have lost your independence. But hearing aids and other devices can help you hear better and feel connected to others.  Follow-up care is a key part of your treatment and safety. Be sure to make and go to all appointments, and call your doctor if you are having problems. It's also a good idea to know your test results and keep a list of the medicines you take.  How can you care for yourself at home?  Avoid loud noises whenever possible. This helps keep your hearing from getting worse.  Always wear hearing protection around loud noises.  Wear a hearing aid as directed.  A professional can help you pick a hearing aid that will work best for you.  You can also get hearing aids over the counter for mild to moderate hearing loss.  Have hearing tests as your doctor suggests. They can show whether your hearing has changed. Your hearing aid may need to be adjusted.  Use other devices as needed. These may include:  Telephone amplifiers and hearing aids that can connect to a television, stereo, radio, or microphone.  Devices that use lights or vibrations. These alert you to the doorbell, a ringing telephone, or a baby monitor.  Television closed-captioning. This shows the words at the bottom of the screen. Most new TVs can do this.  TTY (text telephone). This lets you type messages back and forth on the telephone instead of talking or listening. These devices are also called TDD. When messages are typed on the keyboard, they are sent over the phone line to a receiving TTY. The message is shown on a monitor.  Use text  "messaging, social media, and email if it is hard for you to communicate by telephone.  Try to learn a listening technique called speechreading. It is not lipreading. You pay attention to people's gestures, expressions, posture, and tone of voice. These clues can help you understand what a person is saying. Face the person you are talking to, and have them face you. Make sure the lighting is good. You need to see the other person's face clearly.  Think about counseling if you need help to adjust to your hearing loss.  When should you call for help?  Watch closely for changes in your health, and be sure to contact your doctor if:    You think your hearing is getting worse.     You have new symptoms, such as dizziness or nausea.   Where can you learn more?  Go to https://www.[employer].net/patiented  Enter R798 in the search box to learn more about \"Hearing Loss: Care Instructions.\"  Current as of: September 27, 2023               Content Version: 14.0    7312-0180 [employer].   Care instructions adapted under license by your healthcare professional. If you have questions about a medical condition or this instruction, always ask your healthcare professional. [employer] disclaims any warranty or liability for your use of this information.      Learning About Sleeping Well  What does sleeping well mean?     Sleeping well means getting enough sleep to feel good and stay healthy. How much sleep is enough varies among people.  The number of hours you sleep and how you feel when you wake up are both important. If you do not feel refreshed, you probably need more sleep. Another sign of not getting enough sleep is feeling tired during the day.  Experts recommend that adults get at least 7 or more hours of sleep per day. Children and older adults need more sleep.  Why is getting enough sleep important?  Getting enough quality sleep is a basic part of good health. When your sleep suffers, your " "physical health, mood, and your thoughts can suffer too. You may find yourself feeling more grumpy or stressed. Not getting enough sleep also can lead to serious problems, including injury, accidents, anxiety, and depression.  What might cause poor sleeping?  Many things can cause sleep problems, including:  Changes to your sleep schedule.  Stress. Stress can be caused by fear about a single event, such as giving a speech. Or you may have ongoing stress, such as worry about work or school.  Depression, anxiety, and other mental or emotional conditions.  Changes in your sleep habits or surroundings. This includes changes that happen where you sleep, such as noise, light, or sleeping in a different bed. It also includes changes in your sleep pattern, such as having jet lag or working a late shift.  Health problems, such as pain, breathing problems, and restless legs syndrome.  Lack of regular exercise.  Using alcohol, nicotine, or caffeine before bed.  How can you help yourself?  Here are some tips that may help you sleep more soundly and wake up feeling more refreshed.  Your sleeping area   Use your bedroom only for sleeping and sex. A bit of light reading may help you fall asleep. But if it doesn't, do your reading elsewhere in the house. Try not to use your TV, computer, smartphone, or tablet while you are in bed.  Be sure your bed is big enough to stretch out comfortably, especially if you have a sleep partner.  Keep your bedroom quiet, dark, and cool. Use curtains, blinds, or a sleep mask to block out light. To block out noise, use earplugs, soothing music, or a \"white noise\" machine.  Your evening and bedtime routine   Create a relaxing bedtime routine. You might want to take a warm shower or bath, or listen to soothing music.  Go to bed at the same time every night. And get up at the same time every morning, even if you feel tired.  What to avoid   Limit caffeine (coffee, tea, caffeinated sodas) during the day, " "and don't have any for at least 6 hours before bedtime.  Avoid drinking alcohol before bedtime. Alcohol can cause you to wake up more often during the night.  Try not to smoke or use tobacco, especially in the evening. Nicotine can keep you awake.  Limit naps during the day, especially close to bedtime.  Avoid lying in bed awake for too long. If you can't fall asleep or if you wake up in the middle of the night and can't get back to sleep within about 20 minutes, get out of bed and go to another room until you feel sleepy.  Avoid taking medicine right before bed that may keep you awake or make you feel hyper or energized. Your doctor can tell you if your medicine may do this and if you can take it earlier in the day.  If you can't sleep   Imagine yourself in a peaceful, pleasant scene. Focus on the details and feelings of being in a place that is relaxing.  Get up and do a quiet or boring activity until you feel sleepy.  Avoid drinking any liquids before going to bed to help prevent waking up often to use the bathroom.  Where can you learn more?  Go to https://www.[employer].net/patiented  Enter J942 in the search box to learn more about \"Learning About Sleeping Well.\"  Current as of: July 10, 2023               Content Version: 14.0    7365-4497 [employer].   Care instructions adapted under license by your healthcare professional. If you have questions about a medical condition or this instruction, always ask your healthcare professional. [employer] disclaims any warranty or liability for your use of this information.      "

## 2024-03-26 ENCOUNTER — TELEPHONE (OUTPATIENT)
Dept: FAMILY MEDICINE | Facility: CLINIC | Age: 84
End: 2024-03-26
Payer: COMMERCIAL

## 2024-03-26 NOTE — TELEPHONE ENCOUNTER
Pt calls to ask about the nystatin orders from her office visit on 3/13/24. States that she's always taken this in a pill form, and doesn't understand why it's now liquid. Writer asks pt to get her old bottle for the medication that she's referring to, and pt says that the bottle says atorvastatin. Writer educates pt on both medications (nystatin and atorvastatin), and their indications for use. Pt verbalized understanding, denies further questions at this time.    Abeba Rossi RN  [facility]

## 2024-05-01 ENCOUNTER — OFFICE VISIT (OUTPATIENT)
Dept: DERMATOLOGY | Facility: CLINIC | Age: 84
End: 2024-05-01
Payer: COMMERCIAL

## 2024-05-01 DIAGNOSIS — L82.0 INFLAMED SEBORRHEIC KERATOSIS: Primary | ICD-10-CM

## 2024-05-01 PROCEDURE — 17110 DESTRUCTION B9 LES UP TO 14: CPT | Performed by: DERMATOLOGY

## 2024-05-01 ASSESSMENT — PAIN SCALES - GENERAL: PAINLEVEL: NO PAIN (0)

## 2024-05-01 NOTE — PROGRESS NOTES
Marlene Millan is an extremely pleasant 83 year old year old female patient here today for itching spots on chest and neck.  Patient has no other skin complaints today.  Remainder of the HPI, Meds, PMH, Allergies, FH, and SH was reviewed in chart.      Past Medical History:   Diagnosis Date    Basal cell carcinoma     Hypertension 06/07/2010    Macular degeneration     PONV (postoperative nausea and vomiting)     Pulmonary embolism (H)     Pulmonary embolism, bilateral (H) 02/19/2012    Post-surgical at [facility] following total knee replacement     Shingles outbreak 12/2016    Squamous cell carcinoma of skin, unspecified        Past Surgical History:   Procedure Laterality Date    basal cell cancer of nose  Oct 2012    BREAST SURGERY      breast reduction    CARPAL TUNNEL RELEASE RT/LT      right    COLONOSCOPY N/A 1/10/2023    Procedure: COLONOSCOPY with cold polypectomies;  Surgeon: Sachin Tan DO;  Location: [facility] OR    COMBINED REPAIR PTOSIS WITH BLEPHAROPLASTY BILATERAL Bilateral 6/30/2015    Procedure: COMBINED REPAIR PTOSIS WITH BLEPHAROPLASTY BILATERAL;  Surgeon: Ilir Marvin MD;  Location: [facility]    ESOPHAGOSCOPY, GASTROSCOPY, DUODENOSCOPY (EGD), COMBINED N/A 1/10/2023    Procedure: ESOPHAGOGASTRODUODENOSCOPY WITH biopsies and 45 [place] Savary Dilation;  Surgeon: Sachin Tan DO;  Location: [facility] OR    HC REMOVAL GALLBLADDER      LIGATE VEIN VARICOSE, PHLEBECTOMY MULTIPLE STAB, COMBINED Bilateral 8/13/2015    Procedure: COMBINED LIGATE VEIN VARICOSE, PHLEBECTOMY MULTIPLE STAB;  Surgeon: Alphonse Pro MD;  Location: [facility] OR    ORTHOPEDIC SURGERY      bilateral knee    PHACOEMULSIFICATION WITH STANDARD INTRAOCULAR LENS IMPLANT Right 12/21/2017    Procedure: PHACOEMULSIFICATION WITH STANDARD INTRAOCULAR LENS IMPLANT;  Right Cataract Removal with Implant;  Surgeon: lCif Michel MD;  Location: [facility] OR    SURGICAL HISTORY OF -       breast reduction  mammoplasty     SURGICAL HISTORY OF -       bilateral lower extremity vein stripping    SURGICAL HISTORY OF -   2010    left total knee arthroplasty        Family History   Problem Relation Age of Onset    Diabetes Father         last both legs to the disease    Heart Disease Father          age 91       Social History     Socioeconomic History    Marital status:      Spouse name: Not on file    Number of children: Not on file    Years of education: Not on file    Highest education level: Not on file   Occupational History    Not on file   Tobacco Use    Smoking status: Former     Current packs/day: 0.00     Types: Cigarettes     Start date: 1962     Quit date: 1982     Years since quittin.2    Smokeless tobacco: Never   Vaping Use    Vaping status: Never Used   Substance and Sexual Activity    Alcohol use: Yes     Comment: occ wine    Drug use: No    Sexual activity: Not Currently   Other Topics Concern    Parent/sibling w/ CABG, MI or angioplasty before 65F 55M? No   Social History Narrative    Not on file     Social Determinants of Health     Financial Resource Strain: Low Risk  (3/13/2024)    Financial Resource Strain     Within the past 12 months, have you or your family members you live with been unable to get utilities (heat, electricity) when it was really needed?: No   Food Insecurity: Low Risk  (3/13/2024)    Food Insecurity     Within the past 12 months, did you worry that your food would run out before you got money to buy more?: No     Within the past 12 months, did the food you bought just not last and you didn t have money to get more?: No   Transportation Needs: Low Risk  (3/13/2024)    Transportation Needs     Within the past 12 months, has lack of transportation kept you from medical appointments, getting your medicines, non-medical meetings or appointments, work, or from getting things that you need?: No   Physical Activity: Sufficiently Active (3/13/2024)     Exercise Vital Sign     Days of Exercise per Week: 7 days     Minutes of Exercise per Session: 30 min   Stress: No Stress Concern Present (3/13/2024)    [employer] [employer] - Occupational Stress Questionnaire     Feeling of Stress : Not at all   Social Connections: Unknown (3/13/2024)    Social Connection and Isolation Panel [NHANES]     Frequency of Communication with Friends and Family: Not on file     Frequency of Social Gatherings with Friends and Family: Twice a week     Attends Oriental orthodox Services: Not on file     Active Member of Clubs or Organizations: Not on file     Attends Club or Organization Meetings: Not on file     Marital Status: Not on file   Interpersonal Safety: Low Risk  (3/13/2024)    Interpersonal Safety     Do you feel physically and emotionally safe where you currently live?: Yes     Within the past 12 months, have you been hit, slapped, kicked or otherwise physically hurt by someone?: No     Within the past 12 months, have you been humiliated or emotionally abused in other ways by your partner or ex-partner?: No   Housing Stability: Low Risk  (3/13/2024)    Housing Stability     Do you have housing? : Yes     Are you worried about losing your housing?: No       Outpatient Encounter Medications as of 5/1/2024   Medication Sig Dispense Refill    amLODIPine (NORVASC) 5 MG tablet Take 1 tablet (5 mg) by mouth daily 90 tablet 3    apixaban ANTICOAGULANT (ELIQUIS) 5 MG tablet Take 5 mg by mouth as needed (prior to long distance travel. Repeat dose 12 hours later if travel length > 12 hours.)      atorvastatin (LIPITOR) 40 MG tablet Take 1 tablet (40 mg) by mouth daily 90 tablet 3    Cholecalciferol (VITAMIN D3) 25 MCG (1000 UT) CAPS Take 1 capsule by mouth daily      minoxidil (LONITEN) 2.5 MG tablet 1/4 tabelt daily 60 tablet 3    Multiple Vitamins-Minerals (HAIR SKIN AND NAILS FORMULA PO) Take 2 chew tab by mouth      Multiple Vitamins-Minerals (MACULAR VITAMIN BENEFIT PO)  Take 1 tablet by mouth daily Focus MaculaPro      valACYclovir (VALTREX) 1000 mg tablet 2 tabs at onset of cold sores. Repeat dose after 12 hours. 12 tablet 4     No facility-administered encounter medications on file as of 5/1/2024.             O:   NAD, WDWN, Alert & Oriented, Mood & Affect wnl, Vitals stable   General appearance normal   Vitals stable   Alert, oriented and in no acute distress     Inflamed seborrheic keratosis on chest and neck       Eyes: Conjunctivae/lids:Normal     ENT: Lips, mucosa: normal    MSK:Normal    Cardiovascular: peripheral edema none    Pulm: Breathing Normal    Neuro/Psych: Orientation:Alert and Orientedx3 ; Mood/Affect:normal       A/P:  Inflamed seborrheic keratosis x10   LN2:  Treated with LN2 for 5s for 1-2 cycles. Warned risks of blistering, pain, pigment change, scarring, and incomplete resolution.  Advised patient to return if lesions do not completely resolve.  Wound care sheet given.  It was a pleasure speaking to Marlene Millan today.  Previous clinic notes and pertinent laboratory tests were reviewed prior to Marlene Millan's visit.  Nature and genetics of benign skin lesions dicussed with patient.  Patient encouraged to perform monthly skin exams.  UV precautions reviewed with patient.  Return to clinic next appt

## 2024-05-01 NOTE — LETTER
5/1/2024         RE: Marlene Millan  [address] [facility] [place] [address]        Dear Colleague,    Thank you for referring your patient, Marlene Millan, to the [facility]. Please see a copy of my visit note below.    Marlene Millan is an extremely pleasant 83 year old year old female patient here today for itching spots on chest and neck.  Patient has no other skin complaints today.  Remainder of the HPI, Meds, PMH, Allergies, FH, and SH was reviewed in chart.      Past Medical History:   Diagnosis Date     Basal cell carcinoma      Hypertension 06/07/2010     Macular degeneration      PONV (postoperative nausea and vomiting)      Pulmonary embolism (H)      Pulmonary embolism, bilateral (H) 02/19/2012    [facility]-surgical at [facility] following total knee replacement      Shingles outbreak 12/2016     Squamous cell carcinoma of skin, unspecified        Past Surgical History:   Procedure Laterality Date     basal cell cancer of nose  Oct 2012     BREAST SURGERY      breast reduction     CARPAL TUNNEL RELEASE RT/LT      right     COLONOSCOPY N/A 1/10/2023    Procedure: COLONOSCOPY with cold polypectomies;  Surgeon: Sachin Tan DO;  Location: [facility] OR     COMBINED REPAIR PTOSIS WITH BLEPHAROPLASTY BILATERAL Bilateral 6/30/2015    Procedure: COMBINED REPAIR PTOSIS WITH BLEPHAROPLASTY BILATERAL;  Surgeon: Iilr Marvin MD;  Location: [facility]     ESOPHAGOSCOPY, GASTROSCOPY, DUODENOSCOPY (EGD), COMBINED N/A 1/10/2023    Procedure: ESOPHAGOGASTRODUODENOSCOPY WITH biopsies and 45 [place] Savary Dilation;  Surgeon: Sachin Tan DO;  Location: [facility] OR     HC REMOVAL GALLBLADDER       LIGATE VEIN VARICOSE, PHLEBECTOMY MULTIPLE STAB, COMBINED Bilateral 8/13/2015    Procedure: COMBINED LIGATE VEIN VARICOSE, PHLEBECTOMY MULTIPLE STAB;  Surgeon: Alphonse Pro MD;  Location: [facility] OR     ORTHOPEDIC SURGERY      bilateral knee     PHACOEMULSIFICATION  WITH STANDARD INTRAOCULAR LENS IMPLANT Right 2017    Procedure: PHACOEMULSIFICATION WITH STANDARD INTRAOCULAR LENS IMPLANT;  Right Cataract Removal with Implant;  Surgeon: Clif Michel MD;  Location: [facility] OR     SURGICAL HISTORY OF -       breast reduction mammoplasty      SURGICAL HISTORY OF -       bilateral lower extremity vein stripping     SURGICAL HISTORY OF -   2010    left total knee arthroplasty        Family History   Problem Relation Age of Onset     Diabetes Father         last both legs to the disease     Heart Disease Father          age 91       Social History     Socioeconomic History     Marital status:      Spouse name: Not on file     Number of children: Not on file     Years of education: Not on file     Highest education level: Not on file   Occupational History     Not on file   Tobacco Use     Smoking status: Former     Current packs/day: 0.00     Types: Cigarettes     Start date: 1962     Quit date: 1982     Years since quittin.2     Smokeless tobacco: Never   Vaping Use     Vaping status: Never Used   Substance and Sexual Activity     Alcohol use: Yes     Comment: occ wine     Drug use: No     Sexual activity: Not Currently   Other Topics Concern     Parent/sibling w/ CABG, MI or angioplasty before 65F 55M? No   Social History Narrative     Not on file     Social Determinants of Health     Financial Resource Strain: Low Risk  (3/13/2024)    Financial Resource Strain      Within the past 12 months, have you or your family members you live with been unable to get utilities (heat, electricity) when it was really needed?: No   Food Insecurity: Low Risk  (3/13/2024)    Food Insecurity      Within the past 12 months, did you worry that your food would run out before you got money to buy more?: No      Within the past 12 months, did the food you bought just not last and you didn t have money to get more?: No   Transportation Needs: Low Risk   (3/13/2024)    Transportation Needs      Within the past 12 months, has lack of transportation kept you from medical appointments, getting your medicines, non-medical meetings or appointments, work, or from getting things that you need?: No   Physical Activity: Sufficiently Active (3/13/2024)    Exercise Vital Sign      Days of Exercise per Week: 7 days      Minutes of Exercise per Session: 30 min   Stress: No Stress Concern Present (3/13/2024)    [place] [employer] - Occupational Stress Questionnaire      Feeling of Stress : Not at all   Social Connections: Unknown (3/13/2024)    Social Connection and Isolation Panel [NHANES]      Frequency of Communication with Friends and Family: Not on file      Frequency of Social Gatherings with Friends and Family: Twice a week      Attends Spiritism Services: Not on file      Active Member of Clubs or Organizations: Not on file      Attends Club or Organization Meetings: Not on file      Marital Status: Not on file   Interpersonal Safety: Low Risk  (3/13/2024)    Interpersonal Safety      Do you feel physically and emotionally safe where you currently live?: Yes      Within the past 12 months, have you been hit, slapped, kicked or otherwise physically hurt by someone?: No      Within the past 12 months, have you been humiliated or emotionally abused in other ways by your partner or ex-partner?: No   Housing Stability: Low Risk  (3/13/2024)    Housing Stability      Do you have housing? : Yes      Are you worried about losing your housing?: No       Outpatient Encounter Medications as of 5/1/2024   Medication Sig Dispense Refill     amLODIPine (NORVASC) 5 MG tablet Take 1 tablet (5 mg) by mouth daily 90 tablet 3     apixaban ANTICOAGULANT (ELIQUIS) 5 MG tablet Take 5 mg by mouth as needed (prior to long distance travel. Repeat dose 12 hours later if travel length > 12 hours.)       atorvastatin (LIPITOR) 40 MG tablet Take 1 tablet (40 mg) by mouth daily  90 tablet 3     Cholecalciferol (VITAMIN D3) 25 MCG (1000 UT) CAPS Take 1 capsule by mouth daily       minoxidil (LONITEN) 2.5 MG tablet 1/4 tabelt daily 60 tablet 3     Multiple Vitamins-Minerals (HAIR SKIN AND NAILS FORMULA PO) Take 2 chew tab by mouth       Multiple Vitamins-Minerals (MACULAR VITAMIN BENEFIT PO) Take 1 tablet by mouth daily Focus MaculaPro       valACYclovir (VALTREX) 1000 mg tablet 2 tabs at onset of cold sores. Repeat dose after 12 hours. 12 tablet 4     No facility-administered encounter medications on file as of 5/1/2024.             O:   NAD, WDWN, Alert & Oriented, Mood & Affect wnl, Vitals stable   General appearance normal   Vitals stable   Alert, oriented and in no acute distress     Inflamed seborrheic keratosis on chest and neck       Eyes: Conjunctivae/lids:Normal     ENT: Lips, mucosa: normal    MSK:Normal    Cardiovascular: peripheral edema none    Pulm: Breathing Normal    Neuro/Psych: Orientation:Alert and Orientedx3 ; Mood/Affect:normal       A/P:  Inflamed seborrheic keratosis x10   LN2:  Treated with LN2 for 5s for 1-2 cycles. Warned risks of blistering, pain, pigment change, scarring, and incomplete resolution.  Advised patient to return if lesions do not completely resolve.  Wound care sheet given.  It was a pleasure speaking to Marlene Millan today.  Previous clinic notes and pertinent laboratory tests were reviewed prior to Marlene Millan's visit.  Nature and genetics of benign skin lesions dicussed with patient.  Patient encouraged to perform monthly skin exams.  UV precautions reviewed with patient.  Return to clinic next appt      Again, thank you for allowing me to participate in the care of your patient.        Sincerely,        Errol Anderson MD

## 2024-05-14 ENCOUNTER — NURSE TRIAGE (OUTPATIENT)
Dept: FAMILY MEDICINE | Facility: CLINIC | Age: 84
End: 2024-05-14
Payer: COMMERCIAL

## 2024-05-14 NOTE — TELEPHONE ENCOUNTER
Agree with plan to be seen in clinic tomorrow, if pain and mobility worsens, she will need to seek emergent care.  SEKOU HerndonNP

## 2024-05-14 NOTE — TELEPHONE ENCOUNTER
"Dr Rodriguez  Is an appt tomorrow ok?  She can't get a ride today.    Nurse Triage SBAR    Is this a 2nd Level Triage? YES, LICENSED PRACTITIONER REVIEW IS REQUIRED    Situation: Pt has a very stiff neck and pain in it.  \"It feels like there is a knot in it and I can't get it out\".  Background: She said she's had this before and it was \"degenerative\".    Assessment: Pain rates a 8 to 10.  It hurts to touch chin to neck.  Tylenol and heat to neck are not helping much.  No numbness or tingling.  No weakness in arms or legs.  No injury to neck.  No swelling visible in neck.  No fever  Pt was advised to alternate heat and ice to area.  No chest pain or shortness of breath.  Pain does not radiate anywhere.  She can walk around normally  She is alert and oriented  No speech or visual problems  Protocol Recommended Disposition:   Go To Office Now    Recommendation: Nurse Triage Protocol says go to office today. Pt asked for an appt at [facility] tomorrow. I made her an appt in [facility] for tomorrow morning.  She said she can't get a ride to [place] today.   She refused ADS today in [place].  There are only Virtual openings in [facility] today.    Routed to provider    Does the patient meet one of the following criteria for ADS visit consideration? 16+ years old, with an [facility] PCP     TIP  Providers, please consider if this condition is appropriate for management at one of our Acute and Diagnostic Services sites.     If patient is a good candidate, please use dotphrase <dot>triageresponse and select Refer to ADS to document.  Reason for Disposition   SEVERE pain (e.g., excruciating, unable to do any normal activities)    Additional Information   Negative: Shock suspected (e.g., cold/pale/clammy skin, too weak to stand, low BP, rapid pulse)   Negative: Similar pain previously and it was from 'heart attack'   Negative: Similar pain previously from 'angina' and not relieved by nitroglycerin   Negative: Difficult to awaken or acting " "confused (e.g., disoriented, slurred speech)   Negative: Sounds like a life-threatening emergency to the triager   Negative: Followed an injury to neck (e.g., MVA, sports, impact or collision)   Negative: Chest pain   Negative: Lymph node in the neck is swollen or painful to the touch   Negative: Sore throat is main symptom   Negative: Difficulty breathing or unusual sweating (e.g., sweating without exertion)   Negative: Chest pain lasting longer than 5 minutes   Negative: Stiff neck (can't touch chin to chest) and has headache   Negative: Stiff neck (can't touch chin to chest) and fever   Negative: Weakness of an arm or hand   Negative: Problems with bowel or bladder control   Negative: Patient sounds very sick or weak to the triager    Answer Assessment - Initial Assessment Questions  1. ONSET: \"When did the pain begin?\"       2 days ago  2. LOCATION: \"Where does it hurt?\"       Back right side of neck.  3. PATTERN \"Does the pain come and go, or has it been constant since it started?\"       constant  4. SEVERITY: \"How bad is the pain?\"  (Scale 1-10; or mild, moderate, severe)    - NO PAIN (0): no pain or only slight stiffness     - MILD (1-3): doesn't interfere with normal activities     - MODERATE (4-7): interferes with normal activities or awakens from sleep     - SEVERE (8-10):  excruciating pain, unable to do any normal activities       Severe  5. RADIATION: \"Does the pain go anywhere else, shoot into your arms?\"      No  6. CORD SYMPTOMS: \"Any weakness or numbness of the arms or legs?\"      No  7. CAUSE: \"What do you think is causing the neck pain?\"      I don;'t know  8. NECK OVERUSE: \"Any recent activities that involved turning or twisting the neck?\"      No  9. OTHER SYMPTOMS: \"Do you have any other symptoms?\" (e.g., headache, fever, chest pain, difficulty breathing, neck swelling)      No  10. PREGNANCY: \"Is there any chance you are pregnant?\" \"When was your last menstrual period?\"        No    Protocols " used: Neck Pain or [id number]

## 2024-05-14 NOTE — TELEPHONE ENCOUNTER
Patient scheduled with Brina Oneal 5/15/24 with 11:20 am arrival time.     Aylin Fonseca RN on 5/14/2024 at 9:46 AM

## 2024-05-14 NOTE — TELEPHONE ENCOUNTER
Called patient and left a voice message with the information below.    Aylin Fonseca RN on 5/14/2024 at 11:36 AM

## 2024-05-15 ENCOUNTER — OFFICE VISIT (OUTPATIENT)
Dept: FAMILY MEDICINE | Facility: CLINIC | Age: 84
End: 2024-05-15
Payer: COMMERCIAL

## 2024-05-15 VITALS
BODY MASS INDEX: 27.83 KG/M2 | TEMPERATURE: 97.8 F | OXYGEN SATURATION: 100 % | SYSTOLIC BLOOD PRESSURE: 138 MMHG | RESPIRATION RATE: 16 BRPM | WEIGHT: 163 LBS | DIASTOLIC BLOOD PRESSURE: 60 MMHG | HEART RATE: 70 BPM | HEIGHT: 64 IN

## 2024-05-15 DIAGNOSIS — M54.2 NECK PAIN: Primary | ICD-10-CM

## 2024-05-15 PROCEDURE — 99213 OFFICE O/P EST LOW 20 MIN: CPT | Performed by: NURSE PRACTITIONER

## 2024-05-15 RX ORDER — RESPIRATORY SYNCYTIAL VIRUS VACCINE 120MCG/0.5
0.5 KIT INTRAMUSCULAR ONCE
Qty: 1 EACH | Refills: 0 | Status: CANCELLED | OUTPATIENT
Start: 2024-05-15 | End: 2024-05-15

## 2024-05-15 RX ORDER — TIZANIDINE 2 MG/1
2 TABLET ORAL
Qty: 10 TABLET | Refills: 0 | Status: SHIPPED | OUTPATIENT
Start: 2024-05-15 | End: 2024-06-26

## 2024-05-15 ASSESSMENT — PAIN SCALES - GENERAL: PAINLEVEL: WORST PAIN (10)

## 2024-05-15 NOTE — PATIENT INSTRUCTIONS
Continue with warm showers, can try the topical gel three times a day and the muscle relaxant at night.  Start physical therapy.  Could consider massage therapy as well.  Seek emergent care if pain persists or worsens.      Our Clinic hours are:  Mondays    7:20 am - 7 pm  Tues -  Fri  7:20 am - 5 pm    Clinic Phone: [phone]    The clinic lab opens at 7:30 am Mon - Fri and appointments are required.    [facility]  Ph. [phone]  Monday  8 am - 7pm  Tues - Fri 8 am - 5:30 pm

## 2024-05-15 NOTE — PROGRESS NOTES
"  Assessment & Plan     Neck pain    - Physical Therapy  Referral; Future  - tiZANidine (ZANAFLEX) 2 MG tablet; Take 1 tablet (2 mg) by mouth nightly as needed for muscle spasms  - diclofenac (VOLTAREN) 1 % topical gel; Apply 2 g topically 4 times daily  Discussed how to take the medication(s), expected outcomes, potential side effects.          BMI  Estimated body mass index is 27.98 kg/m  as calculated from the following:    Height as of this encounter: 1.626 m (5' 4\").    Weight as of this encounter: 73.9 kg (163 lb).         See Patient Instructions  Patient Instructions   Continue with warm showers, can try the topical gel three times a day and the muscle relaxant at night.  Start physical therapy.  Could consider massage therapy as well.  Seek emergent care if pain persists or worsens.      Our Clinic hours are:  Mondays    7:20 am - 7 pm  Tues -  Fri  7:20 am - 5 pm    Clinic Phone: [phone]    The clinic lab opens at 7:30 am Mon - Fri and appointments are required.    [facility]. [phone]  Monday  8 am - 7pm  Tues - Fri 8 am - 5:30 pm         Pablo Weller is a 83 year old, presenting for the following health issues:  Musculoskeletal Problem (Stiff neck )      5/15/2024    11:37 AM   Additional Questions   Roomed by      Musculoskeletal Problem    History of Present Illness       Reason for visit:  Neck pain  Symptom onset:  1-3 days ago  Symptom intensity:  Severe  Symptom progression:  Staying the same  Had these symptoms before:  Yes  Has tried/received treatment for these symptoms:  No  What makes it better:  Hot shower otherwise i cant move    She eats 2-3 servings of fruits and vegetables daily.She consumes 0 sweetened beverage(s) daily.She exercises with enough effort to increase her heart rate 10 to 19 minutes per day.  She exercises with enough effort to increase her heart rate 3 or less days per week.   She is taking medications regularly.   " "      States this same thing has happened to her in the past. 3 days ago she complained of neck pain without a reason known. Denies sleeping on it wrong or straining neck.  Pain is on right side of neck. She was thinking of going to get a massage and is wondering what else to try.  No radiation of pain, no headache, no numbness or tingling or weakness in arms.        Review of Systems  Constitutional, HEENT, cardiovascular, pulmonary, gi and gu systems are negative, except as otherwise noted.      Objective    /60   Pulse 70   Temp 97.8  F (36.6  C)   Resp 16   Ht 1.626 m (5' 4\")   Wt 73.9 kg (163 lb)   SpO2 100%   BMI 27.98 kg/m    Body mass index is 27.98 kg/m .  Physical Exam   GENERAL: healthy, alert and no distress  NECK: no adenopathy, no asymmetry, right sided neck pain, decreased ROM but is able to have full ROM with time, no pain with palpitation or crepitus  RESP: lungs clear to auscultation - no rales, rhonchi or wheezes  CV: regular rate and rhythm, normal S1 S2, no S3 or S4, no murmur  MS: no gross musculoskeletal defects noted              Signed Electronically by: NOBLE Thomas CNP    "

## 2024-05-16 ENCOUNTER — HOSPITAL ENCOUNTER (EMERGENCY)
Facility: CLINIC | Age: 84
Discharge: HOME | End: 2024-05-16
Attending: FAMILY MEDICINE | Admitting: FAMILY MEDICINE
Payer: COMMERCIAL

## 2024-05-16 ENCOUNTER — APPOINTMENT (OUTPATIENT)
Dept: CT IMAGING | Facility: CLINIC | Age: 84
End: 2024-05-16
Attending: FAMILY MEDICINE
Payer: COMMERCIAL

## 2024-05-16 ENCOUNTER — TELEPHONE (OUTPATIENT)
Dept: FAMILY MEDICINE | Facility: CLINIC | Age: 84
End: 2024-05-16
Payer: COMMERCIAL

## 2024-05-16 VITALS
RESPIRATION RATE: 16 BRPM | BODY MASS INDEX: 27.83 KG/M2 | HEIGHT: 64 IN | WEIGHT: 163 LBS | TEMPERATURE: 97.6 F | OXYGEN SATURATION: 97 % | HEART RATE: 73 BPM | DIASTOLIC BLOOD PRESSURE: 80 MMHG | SYSTOLIC BLOOD PRESSURE: 143 MMHG

## 2024-05-16 DIAGNOSIS — S16.1XXA CERVICAL STRAIN, INITIAL ENCOUNTER: ICD-10-CM

## 2024-05-16 PROCEDURE — 99283 EMERGENCY DEPT VISIT LOW MDM: CPT | Performed by: FAMILY MEDICINE

## 2024-05-16 PROCEDURE — 250N000013 HC RX MED GY IP 250 OP 250 PS 637: Performed by: FAMILY MEDICINE

## 2024-05-16 PROCEDURE — 99284 EMERGENCY DEPT VISIT MOD MDM: CPT | Mod: 25 | Performed by: FAMILY MEDICINE

## 2024-05-16 PROCEDURE — 72125 CT NECK SPINE W/O DYE: CPT

## 2024-05-16 RX ORDER — IBUPROFEN 600 MG/1
600 TABLET, FILM COATED ORAL EVERY 6 HOURS PRN
Status: DISCONTINUED | OUTPATIENT
Start: 2024-05-16 | End: 2024-05-16 | Stop reason: HOSPADM

## 2024-05-16 RX ORDER — OXYCODONE HYDROCHLORIDE 5 MG/1
5-10 TABLET ORAL EVERY 8 HOURS PRN
Qty: 12 TABLET | Refills: 0 | Status: SHIPPED | OUTPATIENT
Start: 2024-05-16 | End: 2024-05-19

## 2024-05-16 RX ADMIN — IBUPROFEN 600 MG: 600 TABLET ORAL at 15:55

## 2024-05-16 ASSESSMENT — COLUMBIA-SUICIDE SEVERITY RATING SCALE - C-SSRS
2. HAVE YOU ACTUALLY HAD ANY THOUGHTS OF KILLING YOURSELF IN THE PAST MONTH?: NO
6. HAVE YOU EVER DONE ANYTHING, STARTED TO DO ANYTHING, OR PREPARED TO DO ANYTHING TO END YOUR LIFE?: NO
1. IN THE PAST MONTH, HAVE YOU WISHED YOU WERE DEAD OR WISHED YOU COULD GO TO SLEEP AND NOT WAKE UP?: NO

## 2024-05-16 ASSESSMENT — ACTIVITIES OF DAILY LIVING (ADL)
ADLS_ACUITY_SCORE: 33
ADLS_ACUITY_SCORE: 35

## 2024-05-16 NOTE — TELEPHONE ENCOUNTER
S-(situation): the patient called to report the muscle relaxer did not help at all and she is going to go to the ER.    B-(background): the patient was seen yesterday in the clinic.    A-(assessment): the patient reports she tried the muscle relaxer and it not help at all. Her pain is worse. The patient reports she was up all night and in pain. The patient reports she tried the shower in the AM which typically helps and it did not. The patient would like some xrays.    R-(recommendations): the patient called to reports she is going to the ER to be seen and evaluated.  Advised patient she may go there.    Thank you    Kajal VELA RN

## 2024-05-16 NOTE — ED PROVIDER NOTES
HPI   Patient is an 83-year-old female presenting with neck pain.  Per my chart review, the patient has a known history of cervicalgia occurring about 6 months ago.  She had an x-ray at that time showing degenerative changes.  She has been using NSAIDs and Tylenol.  She was seen yesterday for similar and she was given diclofenac topical and Zanaflex.  The medication has not been helpful.    The patient has had neck pain over the past week.  She wakes up at night and in the morning with severe pain.  She does not use an orthotic pillow.  She denies radiating symptoms into her arms or legs.  No new weakness.  No paresthesia.  No coordination.  No severe headache.  No trouble with speech or facial droop.  Her neck pain is similar to what she experienced 6 months ago, just more severe.  Pain is worsened with any movement.  She feels like her neck is tight.      Allergies:  Allergies   Allergen Reactions    Erythromycin Itching    Sulfa Antibiotics Visual Disturbance     Affected eyesight     Problem List:    Patient Active Problem List    Diagnosis Date Noted    Traumatic tear of right rotator cuff, unspecified tear extent, subsequent encounter 09/30/2022     Priority: Medium    Pain in right upper arm 08/12/2022     Priority: Medium    Acute pain of right shoulder 08/12/2022     Priority: Medium    HSV (herpes simplex virus) infection 04/13/2022     Priority: Medium    Acute pulmonary embolism without acute cor pulmonale, unspecified pulmonary embolism type (H) 02/20/2022     Priority: Medium     Ct 2/20/2022- The study is positive for multiple bilateral pulmonary emboli. No evidence of right heart strain.      Senile nuclear cataract 12/19/2017     Priority: Medium    ASD (atrial septal defect) 12/14/2017     Priority: Medium    [facility] 12/12/2012     Priority: Medium     Jazmin Morgan RN-PHN  FPA / A[facility]   [phone]    DX V65.8 REPLACED WITH [id number] [facility]  (04/08/2013)      Advanced directives, counseling/discussion 06/11/2012     Priority: Medium     Received 2007        Patent foramen ovale 02/27/2012     Priority: Medium     Incidental finding on transesophageal echocardiogram      GERD (gastroesophageal reflux disease) 02/25/2011     Priority: Medium     Controlled by diet      HYPERLIPIDEMIA LDL GOAL <130 10/31/2010     Priority: Medium    Essential hypertension 06/07/2010     Priority: Medium    Macular degeneration 07/14/2008     Priority: Medium     Left eye      Spinal stenosis, lumbar region, without neurogenic claudication 07/14/2008     Priority: Medium      Past Medical History:    Past Medical History:   Diagnosis Date    Basal cell carcinoma     Hypertension 06/07/2010    Macular degeneration     PONV (postoperative nausea and vomiting)     Pulmonary embolism (H)     Pulmonary embolism, bilateral (H) 02/19/2012    Shingles outbreak 12/2016    Squamous cell carcinoma of skin, unspecified      Past Surgical History:    Past Surgical History:   Procedure Laterality Date    basal cell cancer of nose  Oct 2012    BREAST SURGERY      breast reduction    CARPAL TUNNEL RELEASE RT/LT      right    COLONOSCOPY N/A 1/10/2023    Procedure: COLONOSCOPY with cold polypectomies;  Surgeon: Sachin Tan DO;  Location: [facility] Main OR    COMBINED REPAIR PTOSIS WITH BLEPHAROPLASTY BILATERAL Bilateral 6/30/2015    Procedure: COMBINED REPAIR PTOSIS WITH BLEPHAROPLASTY BILATERAL;  Surgeon: Ilir Marvin MD;  Location: [facility]    ESOPHAGOSCOPY, GASTROSCOPY, DUODENOSCOPY (EGD), COMBINED N/A 1/10/2023    Procedure: ESOPHAGOGASTRODUODENOSCOPY WITH biopsies and 45 Lao Savary Dilation;  Surgeon: Sachin Tan DO;  Location: [facility] Main OR    HC REMOVAL GALLBLADDER      LIGATE VEIN VARICOSE, PHLEBECTOMY MULTIPLE STAB, COMBINED Bilateral 8/13/2015    Procedure: COMBINED LIGATE VEIN VARICOSE, PHLEBECTOMY MULTIPLE STAB;  Surgeon: Alphonse Pro MD;  Location:  "[facility] OR    ORTHOPEDIC SURGERY      bilateral knee    PHACOEMULSIFICATION WITH STANDARD INTRAOCULAR LENS IMPLANT Right 2017    Procedure: PHACOEMULSIFICATION WITH STANDARD INTRAOCULAR LENS IMPLANT;  Right Cataract Removal with Implant;  Surgeon: Clif Michel MD;  Location: [facility] OR    SURGICAL HISTORY OF -       breast reduction mammoplasty     SURGICAL HISTORY OF -       bilateral lower extremity vein stripping    SURGICAL HISTORY OF -   2010    left total knee arthroplasty     Family History:    Family History   Problem Relation Age of Onset    Diabetes Father         last both legs to the disease    Heart Disease Father          age 91     Social History:  Marital Status:   [5]  Social History     Tobacco Use    Smoking status: Former     Current packs/day: 0.00     Types: Cigarettes     Start date: 1962     Quit date: 1982     Years since quittin.2    Smokeless tobacco: Never   Vaping Use    Vaping status: Never Used   Substance Use Topics    Alcohol use: Yes     Comment: occ wine    Drug use: No      Medications:    oxyCODONE (ROXICODONE) 5 MG tablet  amLODIPine (NORVASC) 5 MG tablet  apixaban ANTICOAGULANT (ELIQUIS) 5 MG tablet  atorvastatin (LIPITOR) 40 MG tablet  Cholecalciferol (VITAMIN D3) 25 MCG (1000 UT) CAPS  diclofenac (VOLTAREN) 1 % topical gel  minoxidil (LONITEN) 2.5 MG tablet  Multiple Vitamins-Minerals (HAIR SKIN AND NAILS FORMULA PO)  Multiple Vitamins-Minerals (MACULAR VITAMIN BENEFIT PO)  tiZANidine (ZANAFLEX) 2 MG tablet  valACYclovir (VALTREX) 1000 mg tablet      Review of Systems   All other systems reviewed and are negative.      PE   BP: (!) 143/80  Pulse: 73  Temp: 97.6  F (36.4  C)  Resp: 16  Height: 162.6 cm (5' 4\")  Weight: 73.9 kg (163 lb)  SpO2: 97 %  Physical Exam  Vitals reviewed.   Constitutional:       General: She is in acute distress.      Appearance: She is well-developed.      Comments: The patient is obviously uncomfortable.  " Whenever she tries to lay her head back she has severe pain and will grimace.   HENT:      Head: Normocephalic and atraumatic.      Right Ear: External ear normal.      Left Ear: External ear normal.      Nose: Nose normal.      Mouth/Throat:      Mouth: Mucous membranes are moist.      Pharynx: Oropharynx is clear.   Eyes:      Extraocular Movements: Extraocular movements intact.      Conjunctiva/sclera: Conjunctivae normal.      Pupils: Pupils are equal, round, and reactive to light.   Neck:      Comments: Tenderness in the posterior neck, diffusely.  Limited range of motion secondary to pain.  Cardiovascular:      Rate and Rhythm: Normal rate and regular rhythm.   Pulmonary:      Effort: Pulmonary effort is normal.   Musculoskeletal:         General: Normal range of motion.   Skin:     General: Skin is warm and dry.   Neurological:      Mental Status: She is alert and oriented to person, place, and time.   Psychiatric:         Behavior: Behavior normal.         ED COURSE and MDM   1446.  No evidence for stroke.  No sudden onset neck pain.  Her pain recurs at night and in the morning.  She has muscle spasm and tenderness throughout the posterior neck.  CT cervical spine pending, no contrast.  Low concern for dissection.  Low concern for malignancy though certainly possibility.  Low concern for meningitis.    1557.  CT imaging shows arthritic changes but no obvious disc pathology.  No cervical fracture.  Oxycodone prescribed, 12 tablets.  Patient has a physical therapy referral order in the system.  Orthopedic referral order placed.    Electronic medical chart reviewed, including medical problems, medications, medical allergies, social history.  Recent hospitalizations and surgical procedures reviewed.  Recent clinic visits and consultations reviewed.  Recent labs and test results reviewed.  Nursing notes reviewed.    The patient, their parent if applicable, and/or their medical decision maker(s) and I have  reviewed all of the available historical information, applicable PMH, physical exam findings, and objective diagnostic data gathered during this ED visit.  We then discussed all work-up options and then together agreed upon the course taken during this visit.  The ultimate disposition and plan was a cooperative decision made between myself and the patient, their parent if applicable, and/or their legal decision maker(s).  The risks and benefits of all decisions made during this visit were discussed to the best of my abilities given the circumstances, and all parties are understanding of the pertinent ramifications of these decisions.      LABS  Labs Ordered and Resulted from Time of ED Arrival to Time of ED Departure - No data to display    IMAGING  Images reviewed by me.  Radiology report also reviewed.  CT Cervical Spine w/o Contrast   Final Result   IMPRESSION: Mild degenerative anterolisthesis of C3 upon C4 and C7   upon T1. Alignment of the cervical vertebrae is otherwise normal;   however, there is reversal of normal cervical lordosis centered at the   C4-C5 level. Vertebral body heights of the cervical spine are normal.   Craniocervical alignment is normal. There are no fractures of the   cervical spine. Loss of disc space height and degenerative endplate   spurring at C4-C5, C5-C6 and C6-C7. Moderate facet arthropathy   throughout the cervical spine. No high-grade spinal canal stenosis. No   prevertebral soft tissue swelling.         Radiation dose for this scan was reduced using automated exposure   control, adjustment of the mA and/or kV according to patient size, or   iterative reconstruction technique      LEVAR CYR MD            SYSTEM ID:  [id number]          Procedures    Medications   ibuprofen (ADVIL/MOTRIN) tablet 600 mg (600 mg Oral $Given 5/16/24 8676)         IMPRESSION       ICD-10-CM    1. Cervical strain, initial encounter  S16.1XXA Orthopedic  Referral               Medication  List        Started      oxyCODONE 5 MG tablet  Commonly known as: ROXICODONE  5-10 mg, Oral, EVERY 8 HOURS PRN                                  Corey Blake MD  05/16/24 4654

## 2024-05-16 NOTE — ED TRIAGE NOTES
Neck pain started on Sunday, happened about 6 months ago as well. Was seen yesterday in [place] [facility] and got a muscle relaxer that didn't help     Triage Assessment (Adult)       Row Name 05/16/24 1314          Triage Assessment    Airway WDL WDL        Respiratory WDL    Respiratory WDL WDL        Skin Circulation/Temperature WDL    Skin Circulation/Temperature WDL WDL        Cardiac WDL    Cardiac WDL WDL        Peripheral/Neurovascular WDL    Peripheral Neurovascular WDL WDL        Cognitive/Neuro/Behavioral WDL    Cognitive/Neuro/Behavioral WDL WDL

## 2024-05-16 NOTE — DISCHARGE INSTRUCTIONS
RETURN TO THE EMERGENCY ROOM FOR THE FOLLOWING:    Severely worsened pain, fever greater than 101 and worsened pain, new weakness on associated with pain, or at anytime for any concern.    FOLLOW UP:    Orthopedic referral order placed at the time of discharge, expect a phone call within the next few days to with scheduling.    TREATMENT RECOMMENDATIONS:    Ibuprofen (10 mg/kg per dose, maximum 600 mg) alternating with acetaminophen (15 mg/kg per dose, maximum 1000 mg) every four hours as needed for fever and/or pain.  Therefore, you can take ibuprofen and then 4 hours later take acetaminophen and then 4 hours later take ibuprofen, etc.  You should not use these medications for more than five days with this dosing schedule.    Oxycodone 1-2 tabs every eight hours as needed for pain control that is not relieved by the above.    Consider MiraLAX to help promote a regular stool pattern.  The goal is to have a soft, easy stool either every day or every other day.  You can titrate MiraLAX to achieve this goal.  MiraLAX is a nonsoluble fiber and is not addictive.  It will not cause bowel dysfunction or pathology.      NURSE ADVICE LINE:  [phone] or [phone]

## 2024-05-17 ENCOUNTER — PATIENT OUTREACH (OUTPATIENT)
Dept: FAMILY MEDICINE | Facility: CLINIC | Age: 84
End: 2024-05-17
Payer: COMMERCIAL

## 2024-05-17 NOTE — TELEPHONE ENCOUNTER
.ED / Discharge Outreach Protocol    Patient Contact    Attempt # 1    Was call answered?  No.  Left message on voicemail with information to call me back.    (Use smartphrase outreacheddischarge to continue)

## 2024-05-20 NOTE — TELEPHONE ENCOUNTER
Transitions of Care Outreach  Chief Complaint   Patient presents with    ER F/U       Most Recent Admission Date: 5/16/2024   Most Recent Admission Diagnosis:      Most Recent Discharge Date: 5/16/2024   Most Recent Discharge Diagnosis: Cervical strain, initial encounter - S16.1XXA     Transitions of Care Assessment    Discharge Assessment  How are you doing now that you are home?: I am still recovering  How are your symptoms? (Red Flag symptoms escalate to triage hotline per guidelines): Unchanged  Do you know how to contact your clinic care team if you have future questions or changes to your health status? : Yes  Does the patient have their discharge instructions? : Yes  Does the patient have questions regarding their discharge instructions? : No  Were you started on any new medications or were there changes to any of your previous medications? : Yes  Does the patient have all of their medications?: Yes  Do you have questions regarding any of your medications? : No  Do you have all of your needed medical supplies or equipment (DME)?  (i.e. oxygen tank, CPAP, cane, etc.): No - What equipment or supplies are needed?    Follow up Plan     Discharge Follow-Up  Discharge follow up appointment scheduled in alignment with recommended follow up timeframe or Transitions of Risk Category? (Low = within 30 days; Moderate= within 14 days; High= within 7 days): Yes  Discharge Follow Up Appointment Date: 05/21/24  Discharge Follow Up Appointment Scheduled with?: Specialty Care Provider    Future Appointments   Date Time Provider Department Center   5/21/2024  3:00 PM Genna Fuentes, PT [facility]   8/13/2024  1:15 PM Errol Anderson MD [facility]   11/5/2024  1:00 PM Errol Anderson MD [facility]   2/18/2025  1:00 PM Errol Anderson MD [facility]   5/6/2025  1:00 PM Errol Anderson MD [facility]       Outpatient Plan as outlined on AVS reviewed with patient.    For any urgent concerns,  please contact our 24 hour nurse triage line: [phone] ([phone])       Aylin Fonseca, RN

## 2024-05-21 ENCOUNTER — THERAPY VISIT (OUTPATIENT)
Dept: PHYSICAL THERAPY | Facility: CLINIC | Age: 84
End: 2024-05-21
Attending: NURSE PRACTITIONER
Payer: COMMERCIAL

## 2024-05-21 DIAGNOSIS — M54.2 NECK PAIN: ICD-10-CM

## 2024-05-21 PROCEDURE — 97110 THERAPEUTIC EXERCISES: CPT | Mod: GP

## 2024-05-21 PROCEDURE — 97161 PT EVAL LOW COMPLEX 20 MIN: CPT | Mod: GP

## 2024-05-21 PROCEDURE — 97140 MANUAL THERAPY 1/> REGIONS: CPT | Mod: GP

## 2024-05-21 NOTE — PROGRESS NOTES
PHYSICAL THERAPY EVALUATION  Type of Visit: Evaluation    See electronic medical record for Abuse and Falls Screening details.    Subjective       Presenting condition or subjective complaint: Pt woke up 24 with excrutiating pain and she couldn't move her neck. She took a hot shower and it relieved some of her pain. She ended up going to ER 24 because her pain wasn't getting any better.  Date of onset: 24    Relevant medical history:     Dates & types of surgery: bi lateral knee surgery    Prior diagnostic imaging/testing results: CT scan; X-ray CT scan 24:  IMPRESSION: Mild degenerative anterolisthesis of C3 upon C4 and C7  upon T1. Alignment of the cervical vertebrae is otherwise normal;  however, there is reversal of normal cervical lordosis centered at the  C4-C5 level. Vertebral body heights of the cervical spine are normal.  Craniocervical alignment is normal. There are no fractures of the  cervical spine. Loss of disc space height and degenerative endplate  spurring at C4-C5, C5-C6 and C6-C7. Moderate facet arthropathy  throughout the cervical spine. No high-grade spinal canal stenosis. No  prevertebral soft tissue swelling.   Prior therapy history for the same diagnosis, illness or injury: No      Prior Level of Function  Independent    Living Environment  Social support: Alone   Type of home: [facility]   Stairs to enter the home: No       Ramp: No   Stairs inside the home: No       Help at home: None  Equipment owned:       Employment: Yes    Hobbies/Interests:      Patient goals for therapy: i want my neck to go back to normal    Pain assessment: Pain present  Location: R neck/Ratin/10 without moving; 5/10     Objective   CERVICAL SPINE EVALUATION  PAIN: Pain Level at Rest: 0/10  Pain Level with Use: 10/10  Pain Location: cervical spine  Pain Quality: Aching, Sharp, and Shooting  Pain Frequency: intermittent  Pain is Worst: first thing in the am  Pain is Exacerbated By: moving head,  driving  Pain is Relieved By: cold, heat, otc medications, and rest  Pain Progression: Improved  POSTURE: Sitting Posture: Forward head  GAIT:   normal  ROM:   (Degrees) Left AROM Right AROM    Cervical Flexion 47* +    Cervical Extension 30* +++    Cervical Side bend 14* +++ 21* +++    Cervical Rotation 35* ++ 31* +      FLEXIBILITY:  tight R UT and cervical paraspinals    SPECIAL TESTS: WFL  PALPATION:  exquisite tenderness R C3-4 paraspinals and R UT    Assessment & Plan   CLINICAL IMPRESSIONS  Medical Diagnosis: Neck pain    Treatment Diagnosis: Neck pain   Impression/Assessment: Patient is a 83 year old female with neck complaints.  The following significant findings have been identified: Pain and Decreased ROM/flexibility. These impairments interfere with their ability to perform self care tasks and driving  as compared to previous level of function.     Clinical Decision Making (Complexity):  Clinical Presentation: Stable/Uncomplicated  Clinical Presentation Rationale: based on medical and personal factors listed in PT evaluation  Clinical Decision Making (Complexity): Low complexity    PLAN OF CARE  Treatment Interventions:  Interventions: Manual Therapy, Therapeutic Exercise    Long Term Goals     PT Goal 1  Goal Identifier: 1  Goal Description: Pt will be able to turn head to B without pain  Rationale: to maximize safety and independence with transportation  PT Goal 2  Goal Identifier: 2  Goal Description: Pt will be able to sleep through the night without waking with pain.  PT Goal 3  Goal Identifier: 3  Goal Description: Pt will be independent with HEP for optimal functional recovery.      Frequency of Treatment: 1x/week  Duration of Treatment: 12 weeks    Education Assessment:   Learner/Method: Patient;Listening;Demonstration;Pictures/Video;No Barriers to Learning    Risks and benefits of evaluation/treatment have been explained.   Patient/Family/caregiver agrees with Plan of Care.     Evaluation Time:      PT Reta Low Complexity Minutes (16597): 20       Signing Clinician: Genna Fuentes PT      [facility]                                                                                   OUTPATIENT PHYSICAL THERAPY      PLAN OF TREATMENT FOR OUTPATIENT REHABILITATION   Patient's Last Name, First Name, Marlene Quiñones YOB: 1940   Provider's Name   [facility]   Medical Record No.  [id number]     Onset Date: 05/12/24  Start of Care Date: 05/21/24     Medical Diagnosis:  Neck pain      PT Treatment Diagnosis:  Neck pain Plan of Treatment  Frequency/Duration: 1x/week/ 12 weeks    Certification date from 05/21/24 to 08/13/24         See note for plan of treatment details and functional goals     Genna Fuentes, PT                         I CERTIFY THE NEED FOR THESE SERVICES FURNISHED UNDER        THIS PLAN OF TREATMENT AND WHILE UNDER MY CARE     (Physician attestation of this document indicates review and certification of the therapy plan).              Referring Provider:  Brina Oneal    Initial Assessment  See Epic Evaluation- Start of Care Date: 05/21/24

## 2024-05-23 ENCOUNTER — THERAPY VISIT (OUTPATIENT)
Dept: PHYSICAL THERAPY | Facility: CLINIC | Age: 84
End: 2024-05-23
Attending: NURSE PRACTITIONER
Payer: COMMERCIAL

## 2024-05-23 DIAGNOSIS — M54.2 NECK PAIN: Primary | ICD-10-CM

## 2024-05-23 PROCEDURE — 97140 MANUAL THERAPY 1/> REGIONS: CPT | Mod: GP

## 2024-06-26 DIAGNOSIS — M54.2 NECK PAIN: ICD-10-CM

## 2024-06-26 RX ORDER — TIZANIDINE 2 MG/1
2 TABLET ORAL
Qty: 10 TABLET | Refills: 0 | Status: SHIPPED | OUTPATIENT
Start: 2024-06-26

## 2024-07-03 ENCOUNTER — OFFICE VISIT (OUTPATIENT)
Dept: PHYSICAL MEDICINE AND REHAB | Facility: CLINIC | Age: 84
End: 2024-07-03
Attending: FAMILY MEDICINE
Payer: COMMERCIAL

## 2024-07-03 VITALS
WEIGHT: 164.8 LBS | HEIGHT: 64 IN | OXYGEN SATURATION: 94 % | SYSTOLIC BLOOD PRESSURE: 155 MMHG | BODY MASS INDEX: 28.13 KG/M2 | HEART RATE: 76 BPM | DIASTOLIC BLOOD PRESSURE: 73 MMHG

## 2024-07-03 DIAGNOSIS — M50.30 DDD (DEGENERATIVE DISC DISEASE), CERVICAL: ICD-10-CM

## 2024-07-03 DIAGNOSIS — S16.1XXA CERVICAL STRAIN, INITIAL ENCOUNTER: ICD-10-CM

## 2024-07-03 DIAGNOSIS — M47.812 CERVICAL SPONDYLOSIS: Primary | ICD-10-CM

## 2024-07-03 PROCEDURE — 99204 OFFICE O/P NEW MOD 45 MIN: CPT | Performed by: STUDENT IN AN ORGANIZED HEALTH CARE EDUCATION/TRAINING PROGRAM

## 2024-07-03 ASSESSMENT — PAIN SCALES - GENERAL: PAINLEVEL: NO PAIN (1)

## 2024-07-03 NOTE — LETTER
7/3/2024      Marlene Millan  [id number] [facility] [place] [address]      Dear Colleague,    Thank you for referring your patient, Marlene Millan, to the [facility]. Please see a copy of my visit note below.    ASSESSMENT:  Marlene Millan is a 84 year old female presents for consultation at the request of Corey Blake who presents today for new patient evaluation of :         Diagnoses and all orders for this visit:  Cervical spondylosis  Cervical strain, initial encounter  -     Orthopedic  Referral  DDD (degenerative disc disease), cervical       Patient is neurologically intact on exam. No myelopathic or red flag symptoms.    Patient is an 84-year-old female who presents with acute to subacute right-sided neck pain and decreased range of motion and lateral cervical rotation on the right side.  Her exam and description of pain are suggestive of an acute cervical strain versus upper cervical facet syndrome.  We reviewed her imaging together which shows multilevel degenerative changes but no focal findings in the upper facets.  Patient has done some sessions of PT and she feels confident in doing her home exercises on a daily basis, so we discussed doing the home exercises every day for the next month and using Voltaren gel 3 times a day for the next week before moving to as needed.  Will plan to follow-up in 1 month to assess progress with conservative measures, and if pain and range of motion limitations are persisting then we will discuss next steps.  Patient is in agreement with this plan, all questions were addressed.    PLAN:  Reviewed spine anatomy and disease process. Discussed diagnosis and treatment options with the patient today. A shared decision making model was used. The patient's values and choices were respected. The following represents what was discussed and decided upon by the provider and the patient.    1. DIAGNOSTIC TESTS  No new imaging  orders at this time.    2. PHYSICAL THERAPY  Patient is already performing a home program, and was encouraged to continue doing so.  Discussed the importance of core strengthening, ROM, stretching exercises with the patient and how each of these entities is important in decreasing pain.  Explained to the patient that the purpose of physical therapy is to teach the patient a home exercise program.  These exercises need to be performed every day in order to decrease pain and prevent future occurrences of pain.  Likened it to brushing one's teeth.      3. MEDICATIONS:  Discussed multiple medication options today with patient. Discussed risks, side effects, and proper use of medications. Patient verbalized understanding.  Discussed using Voltaren 1% gel, small amount applied to the right upper neck 3 times per day for the next week and then move to as needed  No new medications ordered at this visit.    4. INTERVENTIONS:  Patient has not exhausted conservative measures yet, and we will follow up once conservative treatment has completed.    5. OTHER REFERRALS:  No other referrals at this time.    6. FOLLOW-UP  In approximately 4 weeks to assess response to new medication.    Advised patient to call the [facility] if symptoms worsen or you have problems controlling bladder and bowel function.   ______________________________________________________________________    SUBJECTIVE:   Marlene Millan  is a 84 year old female who presents today for new patient evaluation.    Patient reports that on Mother's Day 2024 she woke up with acute right-sided neck pain and difficulty moving her right neck.  As the pain was not improving after few days she went to her primary care and was given muscle relaxer, Voltaren gel, and PT referral which she has since started and completed 2 visits.  The day after her primary care visit the pain was severe so she went to the emergency room and had a CT scan done, was told by the ER doctor  that she has arthritis in her neck which is contributing to this.    At present the pain is located in the right side of the neck and the upper cervical segments and refers down along the cervical paraspinals to the base of the neck.  Pain worsens with any rightward neck rotation or flexion, and she notes limitation in her rotation range only on the right side.  Denies any radiating pain down either upper extremity, no numbness or weakness of arms.      No prior neck surgeries    -Treatment to Date: PT, OTC medications, diclofenac gel, tizanidine      Oswestry (CLEMENTINE) Questionnaire         No data to display                Neck Disability Index:      5/21/2024     2:56 PM   Neck Disability Index (  [employer]. 1991. All rights reserved.; used with permission)   SECTION 1 - PAIN INTENSITY 2   SECTION 4 - READING 0   SECTION 5 - HEADACHES 0   SECTION 6 - CONCENTRATION 2   SECTION 7 - WORK 3   SECTION 8 - DRIVING 2   SECTION 9 - SLEEPING 1   Count 7   Sum 10              -Medications:    Current Outpatient Medications   Medication Sig Dispense Refill     amLODIPine (NORVASC) 5 MG tablet Take 1 tablet (5 mg) by mouth daily 90 tablet 3     apixaban ANTICOAGULANT (ELIQUIS) 5 MG tablet Take 5 mg by mouth as needed (prior to long distance travel. Repeat dose 12 hours later if travel length > 12 hours.)       atorvastatin (LIPITOR) 40 MG tablet Take 1 tablet (40 mg) by mouth daily 90 tablet 3     Cholecalciferol (VITAMIN D3) 25 MCG (1000 UT) CAPS Take 1 capsule by mouth daily       diclofenac (VOLTAREN) 1 % topical gel Apply 2 g topically 4 times daily 50 g 0     minoxidil (LONITEN) 2.5 MG tablet 1/4 tabelt daily 60 tablet 3     Multiple Vitamins-Minerals (HAIR SKIN AND NAILS FORMULA PO) Take 2 chew tab by mouth       Multiple Vitamins-Minerals (MACULAR VITAMIN BENEFIT PO) Take 1 tablet by mouth daily Focus MaculaPro       tiZANidine (ZANAFLEX) 2 MG tablet TAKE 1 TABLET (2 MG) BY MOUTH NIGHTLY AS NEEDED FOR MUSCLE  SPASMS 10 tablet 0     valACYclovir (VALTREX) 1000 mg tablet 2 tabs at onset of cold sores. Repeat dose after 12 hours. 12 tablet 4     No current facility-administered medications for this visit.       Allergies   Allergen Reactions     Erythromycin Itching     Sulfa Antibiotics Visual Disturbance     Affected eyesight       Past Medical History:   Diagnosis Date     Basal cell carcinoma      Hypertension 06/07/2010     Macular degeneration      PONV (postoperative nausea and vomiting)      Pulmonary embolism (H)      Pulmonary embolism, bilateral (H) 02/19/2012    Post-surgical at [facility] following total knee replacement      Shingles outbreak 12/2016     Squamous cell carcinoma of skin, unspecified         Patient Active Problem List   Diagnosis     Macular degeneration     Spinal stenosis, lumbar region, without neurogenic claudication     Essential hypertension     HYPERLIPIDEMIA LDL GOAL <130     GERD (gastroesophageal reflux disease)     Patent foramen ovale     ASD (atrial septal defect)     Senile nuclear cataract     Acute pulmonary embolism without acute cor pulmonale, unspecified pulmonary embolism type (H)     HSV (herpes simplex virus) infection     Pain in right upper arm     Acute pain of right shoulder     Traumatic tear of right rotator cuff, unspecified tear extent, subsequent encounter     Neck pain       Past Surgical History:   Procedure Laterality Date     basal cell cancer of nose  Oct 2012     BREAST SURGERY      breast reduction     CARPAL TUNNEL RELEASE RT/LT      right     COLONOSCOPY N/A 1/10/2023    Procedure: COLONOSCOPY with cold polypectomies;  Surgeon: Sachin Tan DO;  Location: [facility] Main OR     COMBINED REPAIR PTOSIS WITH BLEPHAROPLASTY BILATERAL Bilateral 6/30/2015    Procedure: COMBINED REPAIR PTOSIS WITH BLEPHAROPLASTY BILATERAL;  Surgeon: Ilir Marvin MD;  Location: [facility]     ESOPHAGOSCOPY, GASTROSCOPY, DUODENOSCOPY (EGD), COMBINED N/A 1/10/2023     "Procedure: ESOPHAGOGASTRODUODENOSCOPY WITH biopsies and 45 Portuguese Savary Dilation;  Surgeon: Sachin Tan DO;  Location: [facility] Main OR     HC REMOVAL GALLBLADDER       LIGATE VEIN VARICOSE, PHLEBECTOMY MULTIPLE STAB, COMBINED Bilateral 2015    Procedure: COMBINED LIGATE VEIN VARICOSE, PHLEBECTOMY MULTIPLE STAB;  Surgeon: Alphonse Pro MD;  Location: [facility] OR     ORTHOPEDIC SURGERY      bilateral knee     PHACOEMULSIFICATION WITH STANDARD INTRAOCULAR LENS IMPLANT Right 2017    Procedure: PHACOEMULSIFICATION WITH STANDARD INTRAOCULAR LENS IMPLANT;  Right Cataract Removal with Implant;  Surgeon: Clif Michel MD;  Location: [facility] OR     SURGICAL HISTORY OF -       breast reduction mammoplasty 1990s     SURGICAL HISTORY OF -       bilateral lower extremity vein stripping     SURGICAL HISTORY OF -   2010    left total knee arthroplasty       Family History   Problem Relation Age of Onset     Diabetes Father         last both legs to the disease     Heart Disease Father          age 91       Reviewed past medical, surgical, and family history with patient found on new patient intake packet located in EMR Media tab.     SOCIAL HX: Reviewed    ROS: Specifically negative for bowel/bladder dysfunction, balance changes, headache, dizziness, foot drop, fevers, chills, appetite changes, nausea/vomiting, unexplained weight loss. Otherwise 13 systems reviewed are negative. Please see the patient's intake questionnaire from today for details.    OBJECTIVE:  BP (!) 155/73 (BP Location: Left arm, Patient Position: Sitting, Cuff Size: Adult Regular)   Pulse 76   Ht 5' 3.75\" (1.619 m)   Wt 164 lb 12.8 oz (74.8 kg)   SpO2 94%   BMI 28.51 kg/m      PHYSICAL EXAMINATION:  --CONSTITUTIONAL: Vital signs as above. No acute distress. The patient is well nourished and well groomed.  --PSYCHIATRIC: The patient is awake, alert, oriented to person, place, time and answering questions appropriately with " clear speech. Appropriate mood and affect   --RESPIRATORY: Normal rhythm and effort. No abnormal accessory muscle breathing patterns noted.   --GROSS MOTOR: Easily arises from a seated position.  --CERVICAL SPINE: Inspection reveals no evidence of deformity. Range of motion is limited in cervical lateral rotation to the right, all other planes are within normal limits. Mild tenderness to palpation of right cervical paraspinals, no tenderness in spinous processes.  Spurling maneuver negative bilaterally.  --SHOULDERS: Full range of motion bilaterally. Negative empty can.  Negative Neer's and negative scarf test  --UPPER EXTREMITY MOTOR TESTING:  Wrist flexion left 5/5, right 5/5  Wrist extension left 5/5, right 5/5  Pronators left 5/5, right 5/5  Biceps left 5/5, right 5/5   Triceps left 5/5, right 5/5   Shoulder abduction left 5/5, right 5/5   left 5/5, right 5/5  --NEUROLOGIC: Sensation to upper extremities is intact bilaterally.  Negative Gomez's bilaterally.    --VASCULAR: Warm upper limbs bilaterally. Capillary refill in the upper extremities is less than 1 second.    RESULTS: Available medical records from [facility] and any other outside records were reviewed today.     Imaging:  Available relevant imaging was personally reviewed and interpreted today. The images were shown to the patient and the findings were explained using a spine model.    CT Cervical Spine 5/16/24:  IMPRESSION: Mild degenerative anterolisthesis of C3 upon C4 and C7  upon T1. Alignment of the cervical vertebrae is otherwise normal;  however, there is reversal of normal cervical lordosis centered at the  C4-C5 level. Vertebral body heights of the cervical spine are normal.  Craniocervical alignment is normal. There are no fractures of the  cervical spine. Loss of disc space height and degenerative endplate  spurring at C4-C5, C5-C6 and C6-C7. Moderate facet arthropathy  throughout the cervical spine. No high-grade spinal canal  stenosis. No  prevertebral soft tissue swelling.     XR Cervical Spine 10/9/23:  IMPRESSION:      1.  No acute fracture. Vertebral body heights are maintained.  2.  Reversal of the cervical lordosis centered at C5. 3 mm  degenerative anterolisthesis of C3 on C4. 4 mm degenerative  anterolisthesis of C7 on T1.  3.  Multilevel degenerative disc disease with disc height loss  greatest and advanced at C5-C6. There is moderate disc height loss at  C4-C5 and C6-C7. Disc height loss is no greater than mild at the  remaining cervical levels.  4.  Moderate to severe facet arthropathy at C6-C7 with mild to  moderate facet arthropathy throughout the remainder of the cervical  spine.  5.  No prevertebral soft tissue edema.  6.  No focal extraspinal abnormality.      Again, thank you for allowing me to participate in the care of your patient.        Sincerely,        Chirag Potter MD

## 2024-07-03 NOTE — PROGRESS NOTES
ASSESSMENT:  Marlene Mlilan is a 84 year old female presents for consultation at the request of Corey Blake who presents today for new patient evaluation of :         Diagnoses and all orders for this visit:  Cervical spondylosis  Cervical strain, initial encounter  -     Orthopedic  Referral  DDD (degenerative disc disease), cervical       Patient is neurologically intact on exam. No myelopathic or red flag symptoms.    Patient is an 84-year-old female who presents with acute to subacute right-sided neck pain and decreased range of motion and lateral cervical rotation on the right side.  Her exam and description of pain are suggestive of an acute cervical strain versus upper cervical facet syndrome.  We reviewed her imaging together which shows multilevel degenerative changes but no focal findings in the upper facets.  Patient has done some sessions of PT and she feels confident in doing her home exercises on a daily basis, so we discussed doing the home exercises every day for the next month and using Voltaren gel 3 times a day for the next week before moving to as needed.  Will plan to follow-up in 1 month to assess progress with conservative measures, and if pain and range of motion limitations are persisting then we will discuss next steps.  Patient is in agreement with this plan, all questions were addressed.    PLAN:  Reviewed spine anatomy and disease process. Discussed diagnosis and treatment options with the patient today. A shared decision making model was used. The patient's values and choices were respected. The following represents what was discussed and decided upon by the provider and the patient.    1. DIAGNOSTIC TESTS  No new imaging orders at this time.    2. PHYSICAL THERAPY  Patient is already performing a home program, and was encouraged to continue doing so.  Discussed the importance of core strengthening, ROM, stretching exercises with the patient and how each of these entities  is important in decreasing pain.  Explained to the patient that the purpose of physical therapy is to teach the patient a home exercise program.  These exercises need to be performed every day in order to decrease pain and prevent future occurrences of pain.  Likened it to brushing one's teeth.      3. MEDICATIONS:  Discussed multiple medication options today with patient. Discussed risks, side effects, and proper use of medications. Patient verbalized understanding.  Discussed using Voltaren 1% gel, small amount applied to the right upper neck 3 times per day for the next week and then move to as needed  No new medications ordered at this visit.    4. INTERVENTIONS:  Patient has not exhausted conservative measures yet, and we will follow up once conservative treatment has completed.    5. OTHER REFERRALS:  No other referrals at this time.    6. FOLLOW-UP  In approximately 4 weeks to assess response to new medication.    Advised patient to call the Spine Center if symptoms worsen or you have problems controlling bladder and bowel function.   ______________________________________________________________________    SUBJECTIVE:   Marlene Millan  is a 84 year old female who presents today for new patient evaluation.    Patient reports that on Mother's Day 2024 she woke up with acute right-sided neck pain and difficulty moving her right neck.  As the pain was not improving after few days she went to her primary care and was given muscle relaxer, Voltaren gel, and PT referral which she has since started and completed 2 visits.  The day after her primary care visit the pain was severe so she went to the emergency room and had a CT scan done, was told by the ER doctor that she has arthritis in her neck which is contributing to this.    At present the pain is located in the right side of the neck and the upper cervical segments and refers down along the cervical paraspinals to the base of the neck.  Pain worsens with any  rightward neck rotation or flexion, and she notes limitation in her rotation range only on the right side.  Denies any radiating pain down either upper extremity, no numbness or weakness of arms.      No prior neck surgeries    -Treatment to Date: PT, OTC medications, diclofenac gel, tizanidine      Oswestry (CLEMENTINE) Questionnaire         No data to display                Neck Disability Index:      5/21/2024     2:56 PM   Neck Disability Index (  Vik[employer] H. [employer]. 1991. All rights reserved.; used with permission)   SECTION 1 - PAIN INTENSITY 2   SECTION 4 - READING 0   SECTION 5 - HEADACHES 0   SECTION 6 - CONCENTRATION 2   SECTION 7 - WORK 3   SECTION 8 - DRIVING 2   SECTION 9 - SLEEPING 1   Count 7   Sum 10              -Medications:    Current Outpatient Medications   Medication Sig Dispense Refill    amLODIPine (NORVASC) 5 MG tablet Take 1 tablet (5 mg) by mouth daily 90 tablet 3    apixaban ANTICOAGULANT (ELIQUIS) 5 MG tablet Take 5 mg by mouth as needed (prior to long distance travel. Repeat dose 12 hours later if travel length > 12 hours.)      atorvastatin (LIPITOR) 40 MG tablet Take 1 tablet (40 mg) by mouth daily 90 tablet 3    Cholecalciferol (VITAMIN D3) 25 MCG (1000 UT) CAPS Take 1 capsule by mouth daily      diclofenac (VOLTAREN) 1 % topical gel Apply 2 g topically 4 times daily 50 g 0    minoxidil (LONITEN) 2.5 MG tablet 1/4 tabelt daily 60 tablet 3    Multiple Vitamins-Minerals (HAIR SKIN AND NAILS FORMULA PO) Take 2 chew tab by mouth      Multiple Vitamins-Minerals (MACULAR VITAMIN BENEFIT PO) Take 1 tablet by mouth daily Focus MaculaPro      tiZANidine (ZANAFLEX) 2 MG tablet TAKE 1 TABLET (2 MG) BY MOUTH NIGHTLY AS NEEDED FOR MUSCLE SPASMS 10 tablet 0    valACYclovir (VALTREX) 1000 mg tablet 2 tabs at onset of cold sores. Repeat dose after 12 hours. 12 tablet 4     No current facility-administered medications for this visit.       Allergies   Allergen Reactions    Erythromycin Itching    Sulfa  Antibiotics Visual Disturbance     Affected eyesight       Past Medical History:   Diagnosis Date    Basal cell carcinoma     Hypertension 06/07/2010    Macular degeneration     PONV (postoperative nausea and vomiting)     Pulmonary embolism (H)     Pulmonary embolism, bilateral (H) 02/19/2012    Post-surgical at [facility] following total knee replacement     Shingles outbreak 12/2016    Squamous cell carcinoma of skin, unspecified         Patient Active Problem List   Diagnosis    Macular degeneration    Spinal stenosis, lumbar region, without neurogenic claudication    Essential hypertension    HYPERLIPIDEMIA LDL GOAL <130    GERD (gastroesophageal reflux disease)    Patent foramen ovale    ASD (atrial septal defect)    Senile nuclear cataract    Acute pulmonary embolism without acute cor pulmonale, unspecified pulmonary embolism type (H)    HSV (herpes simplex virus) infection    Pain in right upper arm    Acute pain of right shoulder    Traumatic tear of right rotator cuff, unspecified tear extent, subsequent encounter    Neck pain       Past Surgical History:   Procedure Laterality Date    basal cell cancer of nose  Oct 2012    BREAST SURGERY      breast reduction    CARPAL TUNNEL RELEASE RT/LT      right    COLONOSCOPY N/A 1/10/2023    Procedure: COLONOSCOPY with cold polypectomies;  Surgeon: Sachin Tan DO;  Location: [facility] OR    COMBINED REPAIR PTOSIS WITH BLEPHAROPLASTY BILATERAL Bilateral 6/30/2015    Procedure: COMBINED REPAIR PTOSIS WITH BLEPHAROPLASTY BILATERAL;  Surgeon: Ilir Marvin MD;  Location: [facility]    ESOPHAGOSCOPY, GASTROSCOPY, DUODENOSCOPY (EGD), COMBINED N/A 1/10/2023    Procedure: ESOPHAGOGASTRODUODENOSCOPY WITH biopsies and 45 Maldivian Savary Dilation;  Surgeon: Sachin Tan DO;  Location: [facility] OR    HC REMOVAL GALLBLADDER      LIGATE VEIN VARICOSE, PHLEBECTOMY MULTIPLE STAB, COMBINED Bilateral 8/13/2015    Procedure: COMBINED LIGATE VEIN VARICOSE,  "PHLEBECTOMY MULTIPLE STAB;  Surgeon: Alphonse Pro MD;  Location: [facility] OR    ORTHOPEDIC SURGERY      bilateral knee    PHACOEMULSIFICATION WITH STANDARD INTRAOCULAR LENS IMPLANT Right 2017    Procedure: PHACOEMULSIFICATION WITH STANDARD INTRAOCULAR LENS IMPLANT;  Right Cataract Removal with Implant;  Surgeon: Clif Michel MD;  Location: [facility] OR    SURGICAL HISTORY OF -       breast reduction mammoplasty 1990s    SURGICAL HISTORY OF -       bilateral lower extremity vein stripping    SURGICAL HISTORY OF -   2010    left total knee arthroplasty       Family History   Problem Relation Age of Onset    Diabetes Father         last both legs to the disease    Heart Disease Father          age 91       Reviewed past medical, surgical, and family history with patient found on new patient intake packet located in EMR Media tab.     SOCIAL HX: Reviewed    ROS: Specifically negative for bowel/bladder dysfunction, balance changes, headache, dizziness, foot drop, fevers, chills, appetite changes, nausea/vomiting, unexplained weight loss. Otherwise 13 systems reviewed are negative. Please see the patient's intake questionnaire from today for details.    OBJECTIVE:  BP (!) 155/73 (BP Location: Left arm, Patient Position: Sitting, Cuff Size: Adult Regular)   Pulse 76   Ht 5' 3.75\" (1.619 m)   Wt 164 lb 12.8 oz (74.8 kg)   SpO2 94%   BMI 28.51 kg/m      PHYSICAL EXAMINATION:  --CONSTITUTIONAL: Vital signs as above. No acute distress. The patient is well nourished and well groomed.  --PSYCHIATRIC: The patient is awake, alert, oriented to person, place, time and answering questions appropriately with clear speech. Appropriate mood and affect   --RESPIRATORY: Normal rhythm and effort. No abnormal accessory muscle breathing patterns noted.   --GROSS MOTOR: Easily arises from a seated position.  --CERVICAL SPINE: Inspection reveals no evidence of deformity. Range of motion is limited in cervical lateral " rotation to the right, all other planes are within normal limits. Mild tenderness to palpation of right cervical paraspinals, no tenderness in spinous processes.  Spurling maneuver negative bilaterally.  --SHOULDERS: Full range of motion bilaterally. Negative empty can.  Negative Neer's and negative scarf test  --UPPER EXTREMITY MOTOR TESTING:  Wrist flexion left 5/5, right 5/5  Wrist extension left 5/5, right 5/5  Pronators left 5/5, right 5/5  Biceps left 5/5, right 5/5   Triceps left 5/5, right 5/5   Shoulder abduction left 5/5, right 5/5   left 5/5, right 5/5  --NEUROLOGIC: Sensation to upper extremities is intact bilaterally.  Negative Gomez's bilaterally.    --VASCULAR: Warm upper limbs bilaterally. Capillary refill in the upper extremities is less than 1 second.    RESULTS: Available medical records from [facility] and any other outside records were reviewed today.     Imaging:  Available relevant imaging was personally reviewed and interpreted today. The images were shown to the patient and the findings were explained using a spine model.    CT Cervical Spine 5/16/24:  IMPRESSION: Mild degenerative anterolisthesis of C3 upon C4 and C7  upon T1. Alignment of the cervical vertebrae is otherwise normal;  however, there is reversal of normal cervical lordosis centered at the  C4-C5 level. Vertebral body heights of the cervical spine are normal.  Craniocervical alignment is normal. There are no fractures of the  cervical spine. Loss of disc space height and degenerative endplate  spurring at C4-C5, C5-C6 and C6-C7. Moderate facet arthropathy  throughout the cervical spine. No high-grade spinal canal stenosis. No  prevertebral soft tissue swelling.     XR Cervical Spine 10/9/23:  IMPRESSION:      1.  No acute fracture. Vertebral body heights are maintained.  2.  Reversal of the cervical lordosis centered at C5. 3 mm  degenerative anterolisthesis of C3 on C4. 4 mm degenerative  anterolisthesis of C7 on  T1.  3.  Multilevel degenerative disc disease with disc height loss  greatest and advanced at C5-C6. There is moderate disc height loss at  C4-C5 and C6-C7. Disc height loss is no greater than mild at the  remaining cervical levels.  4.  Moderate to severe facet arthropathy at C6-C7 with mild to  moderate facet arthropathy throughout the remainder of the cervical  spine.  5.  No prevertebral soft tissue edema.  6.  No focal extraspinal abnormality.

## 2024-07-03 NOTE — PATIENT INSTRUCTIONS
~Spine Center Scheduling #[phone].  ~Please call our [facility] Spine Nurse Navigation #[phone] with any questions or concerns about your treatment plan, if symptoms worsen and you would like to be seen urgently, or if you have problems controlling bladder and bowel function.  ~For any future flareups or new symptoms, recommend follow-up in clinic or contact the nurse navigator line.  ~Please note that any My Chart messages may take multiple days for a response due to the high volume of patients seen in clinic.  Anything sent Friday night or after will be answered the following week when able.     We discussed Voltaren 1% gel which you should apply a small amount to the affected area 3 times a day for the next 7 days, and then you can use it as needed after that.    Continue the PT home exercises at least once per day and up to twice per day if you can manage it.

## 2024-07-09 NOTE — PROGRESS NOTES
05/23/24 0500   Appointment Info   Signing clinician's name / credentials Genna Fuentes, PT   Total/Authorized Visits 12 [facility]   Visits Used 2   Medical Diagnosis Neck pain   PT Tx Diagnosis Neck pain   Quick Adds Certification   Progress Note/Certification   Start of Care Date 05/21/24   Onset of illness/injury or Date of Surgery 05/12/24   Therapy Frequency 1x/week   Predicted Duration 12 weeks   Certification date from 05/21/24   Certification date to 08/13/24   GOALS   PT Goals 2;3   PT Goal 1   Goal Identifier 1   Goal Description Pt will be able to turn head to B without pain   Rationale to maximize safety and independence with transportation   Target Date 07/02/24   PT Goal 2   Goal Identifier 2   Goal Description Pt will be able to sleep through the night without waking with pain.   Target Date 08/13/24   PT Goal 3   Goal Identifier 3   Goal Description Pt will be independent with HEP for optimal functional recovery.   Target Date 08/13/24   Subjective Report   Subjective Report Pt reports her pain is better   Objective Measures   Objective Measures Objective Measure 1   Objective Measure 1   Objective Measure CROM   Details Before rx: R rot 42*, L rot 53*;  after rx: R rot 50*, L rot 55*  (Initially, R rot 31*, L 35*)   Treatment Interventions (PT)   Interventions Manual Therapy;Therapeutic Procedure/Exercise   Manual Therapy   Manual Therapy: Mobilization, MFR, MLD, friction massage minutes (42885) 30   Manual Therapy 1 - Details MFR/TPR R UT and cervical paraspinals, SNAGS C3-4, 4-5, 5-6 rot B x 6, MET R C3-5 rot/SB   Education   Learner/Method Patient;Listening;Demonstration;Pictures/Video;No Barriers to Learning   Plan   Plan for next session MT as needed, CROM   Total Session Time   Timed Code Treatment Minutes 30   Total Treatment Time (sum of timed and untimed services) 30     Pt has not returned after 2 treatment sessions with PT. Current status is unknown. Refer to Initial Eval for last known  status. Will discharge PT.     Genna Fuentes PT

## 2024-08-01 ENCOUNTER — TRANSFERRED RECORDS (OUTPATIENT)
Dept: HEALTH INFORMATION MANAGEMENT | Facility: CLINIC | Age: 84
End: 2024-08-01
Payer: COMMERCIAL

## 2024-08-13 ENCOUNTER — OFFICE VISIT (OUTPATIENT)
Dept: DERMATOLOGY | Facility: CLINIC | Age: 84
End: 2024-08-13
Payer: COMMERCIAL

## 2024-08-13 DIAGNOSIS — L82.0 INFLAMED SEBORRHEIC KERATOSIS: Primary | ICD-10-CM

## 2024-08-13 DIAGNOSIS — L72.0 EPIDERMAL CYST: ICD-10-CM

## 2024-08-13 PROCEDURE — 99212 OFFICE O/P EST SF 10 MIN: CPT | Mod: 25 | Performed by: DERMATOLOGY

## 2024-08-13 PROCEDURE — 17110 DESTRUCTION B9 LES UP TO 14: CPT | Performed by: DERMATOLOGY

## 2024-08-13 NOTE — LETTER
8/13/2024      Marlene Millan  [address] [facility] [place] [address]      Dear Colleague,    Thank you for referring your patient, Marlene Millan, to the [facility]. Please see a copy of my visit note below.    Marlene Millan is an extremely pleasant 84 year old year old female patient here today for for itching spots on chest and neck. She notes spot on back, tender.  Patient has no other skin complaints today.  Remainder of the HPI, Meds, PMH, Allergies, FH, and SH was reviewed in chart.      Past Medical History:   Diagnosis Date     Basal cell carcinoma      Hypertension 06/07/2010     Macular degeneration      PONV (postoperative nausea and vomiting)      Pulmonary embolism (H)      Pulmonary embolism, bilateral (H) 02/19/2012    Post-surgical at [facility] following total knee replacement      Shingles outbreak 12/2016     Squamous cell carcinoma of skin, unspecified        Past Surgical History:   Procedure Laterality Date     basal cell cancer of nose  Oct 2012     BREAST SURGERY      breast reduction     CARPAL TUNNEL RELEASE RT/LT      right     COLONOSCOPY N/A 1/10/2023    Procedure: COLONOSCOPY with cold polypectomies;  Surgeon: Sachin Tan DO;  Location: [facility] OR     COMBINED REPAIR PTOSIS WITH BLEPHAROPLASTY BILATERAL Bilateral 6/30/2015    Procedure: COMBINED REPAIR PTOSIS WITH BLEPHAROPLASTY BILATERAL;  Surgeon: Ilir Marvin MD;  Location: [facility]     ESOPHAGOSCOPY, GASTROSCOPY, DUODENOSCOPY (EGD), COMBINED N/A 1/10/2023    Procedure: ESOPHAGOGASTRODUODENOSCOPY WITH biopsies and 45 Amharic Savary Dilation;  Surgeon: Sachin Tan DO;  Location: [facility] OR     HC REMOVAL GALLBLADDER       LIGATE VEIN VARICOSE, PHLEBECTOMY MULTIPLE STAB, COMBINED Bilateral 8/13/2015    Procedure: COMBINED LIGATE VEIN VARICOSE, PHLEBECTOMY MULTIPLE STAB;  Surgeon: Alphonse Pro MD;  Location: [facility] OR     ORTHOPEDIC SURGERY      bilateral knee      PHACOEMULSIFICATION WITH STANDARD INTRAOCULAR LENS IMPLANT Right 2017    Procedure: PHACOEMULSIFICATION WITH STANDARD INTRAOCULAR LENS IMPLANT;  Right Cataract Removal with Implant;  Surgeon: Clif Michel MD;  Location: [facility] OR     SURGICAL HISTORY OF -       breast reduction mammoplasty      SURGICAL HISTORY OF -       bilateral lower extremity vein stripping     SURGICAL HISTORY OF -   2010    left total knee arthroplasty        Family History   Problem Relation Age of Onset     Diabetes Father         last both legs to the disease     Heart Disease Father          age 91       Social History     Socioeconomic History     Marital status:      Spouse name: Not on file     Number of children: Not on file     Years of education: Not on file     Highest education level: Not on file   Occupational History     Not on file   Tobacco Use     Smoking status: Former     Current packs/day: 0.00     Types: Cigarettes     Start date: 1962     Quit date: 1982     Years since quittin.5     Smokeless tobacco: Never   Vaping Use     Vaping status: Never Used   Substance and Sexual Activity     Alcohol use: Yes     Comment: occ wine     Drug use: No     Sexual activity: Not Currently   Other Topics Concern     Parent/sibling w/ CABG, MI or angioplasty before 65F 55M? No   Social History Narrative     Not on file     Social Determinants of Health     Financial Resource Strain: Low Risk  (3/13/2024)    Financial Resource Strain      Within the past 12 months, have you or your family members you live with been unable to get utilities (heat, electricity) when it was really needed?: No   Food Insecurity: Low Risk  (3/13/2024)    Food Insecurity      Within the past 12 months, did you worry that your food would run out before you got money to buy more?: No      Within the past 12 months, did the food you bought just not last and you didn t have money to get more?: No   Transportation  Needs: Low Risk  (3/13/2024)    Transportation Needs      Within the past 12 months, has lack of transportation kept you from medical appointments, getting your medicines, non-medical meetings or appointments, work, or from getting things that you need?: No   Physical Activity: Sufficiently Active (3/13/2024)    Exercise Vital Sign      Days of Exercise per Week: 7 days      Minutes of Exercise per Session: 30 min   Stress: No Stress Concern Present (3/13/2024)    [employer] [place] of Occupational [employer] - Occupational Stress Questionnaire      Feeling of Stress : Not at all   Social Connections: Unknown (3/13/2024)    Social Connection and Isolation Panel [NHANES]      Frequency of Communication with Friends and Family: Not on file      Frequency of Social Gatherings with Friends and Family: Twice a week      Attends Adventism Services: Not on file      Active Member of Clubs or Organizations: Not on file      Attends Club or Organization Meetings: Not on file      Marital Status: Not on file   Interpersonal Safety: Low Risk  (3/13/2024)    Interpersonal Safety      Do you feel physically and emotionally safe where you currently live?: Yes      Within the past 12 months, have you been hit, slapped, kicked or otherwise physically hurt by someone?: No      Within the past 12 months, have you been humiliated or emotionally abused in other ways by your partner or ex-partner?: No   Housing Stability: Low Risk  (3/13/2024)    Housing Stability      Do you have housing? : Yes      Are you worried about losing your housing?: No       Outpatient Encounter Medications as of 8/13/2024   Medication Sig Dispense Refill     amLODIPine (NORVASC) 5 MG tablet Take 1 tablet (5 mg) by mouth daily 90 tablet 3     apixaban ANTICOAGULANT (ELIQUIS) 5 MG tablet Take 5 mg by mouth as needed (prior to long distance travel. Repeat dose 12 hours later if travel length > 12 hours.)       atorvastatin (LIPITOR) 40 MG tablet Take 1 tablet (40  mg) by mouth daily 90 tablet 3     Cholecalciferol (VITAMIN D3) 25 MCG (1000 UT) CAPS Take 1 capsule by mouth daily       diclofenac (VOLTAREN) 1 % topical gel Apply 2 g topically 4 times daily 50 g 0     minoxidil (LONITEN) 2.5 MG tablet 1/4 tabelt daily 60 tablet 3     Multiple Vitamins-Minerals (HAIR SKIN AND NAILS FORMULA PO) Take 2 chew tab by mouth       Multiple Vitamins-Minerals (MACULAR VITAMIN BENEFIT PO) Take 1 tablet by mouth daily Focus MaculaPro       tiZANidine (ZANAFLEX) 2 MG tablet TAKE 1 TABLET (2 MG) BY MOUTH NIGHTLY AS NEEDED FOR MUSCLE SPASMS 10 tablet 0     valACYclovir (VALTREX) 1000 mg tablet 2 tabs at onset of cold sores. Repeat dose after 12 hours. 12 tablet 4     No facility-administered encounter medications on file as of 8/13/2024.             O:   NAD, WDWN, Alert & Oriented, Mood & Affect wnl, Vitals stable   General appearance normal   Vitals stable   Alert, oriented and in no acute distress  Inflamed seborrheic keratosis neck   R upper back nodule with comedone          Eyes: Conjunctivae/lids:Normal     ENT: Lips, mucosa: normal    MSK:Normal    Cardiovascular: peripheral edema none    Pulm: Breathing Normal    Neuro/Psych: Orientation:Alert and Orientedx3 ; Mood/Affect:normal       A/P:  Inflamed seborrheic keratosis neck x14  LN2:  Treated with LN2 for 5s for 1-2 cycles. Warned risks of blistering, pain, pigment change, scarring, and incomplete resolution.  Advised patient to return if lesions do not completely resolve.  Wound care sheet given.  2. Epidermal cyst  Excision discussed with patient   Schedule   It was a pleasure speaking to Marlene Millan today.  Previous clinic notes and pertinent laboratory tests were reviewed prior to Marlene Millan's visit.      Again, thank you for allowing me to participate in the care of your patient.        Sincerely,        Errol Anderson MD
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
## 2024-09-10 ENCOUNTER — HOSPITAL ENCOUNTER (EMERGENCY)
Facility: CLINIC | Age: 84
Discharge: HOME | End: 2024-09-10
Attending: NURSE PRACTITIONER | Admitting: NURSE PRACTITIONER
Payer: COMMERCIAL

## 2024-09-10 ENCOUNTER — APPOINTMENT (OUTPATIENT)
Dept: CT IMAGING | Facility: CLINIC | Age: 84
End: 2024-09-10
Attending: NURSE PRACTITIONER
Payer: COMMERCIAL

## 2024-09-10 ENCOUNTER — APPOINTMENT (OUTPATIENT)
Dept: GENERAL RADIOLOGY | Facility: CLINIC | Age: 84
End: 2024-09-10
Attending: NURSE PRACTITIONER
Payer: COMMERCIAL

## 2024-09-10 VITALS
WEIGHT: 160 LBS | OXYGEN SATURATION: 95 % | SYSTOLIC BLOOD PRESSURE: 180 MMHG | HEIGHT: 63 IN | TEMPERATURE: 98.5 F | RESPIRATION RATE: 18 BRPM | DIASTOLIC BLOOD PRESSURE: 83 MMHG | HEART RATE: 73 BPM | BODY MASS INDEX: 28.35 KG/M2

## 2024-09-10 DIAGNOSIS — S09.90XA HEAD INJURY, INITIAL ENCOUNTER: ICD-10-CM

## 2024-09-10 DIAGNOSIS — S06.0X0A CONCUSSION WITHOUT LOSS OF CONSCIOUSNESS, INITIAL ENCOUNTER: ICD-10-CM

## 2024-09-10 DIAGNOSIS — S82.831A TRAUMATIC CLOSED NONDISPLACED FRACTURE OF DISTAL FIBULA, RIGHT, INITIAL ENCOUNTER: ICD-10-CM

## 2024-09-10 DIAGNOSIS — S62.639A: ICD-10-CM

## 2024-09-10 PROCEDURE — 99284 EMERGENCY DEPT VISIT MOD MDM: CPT | Mod: 25

## 2024-09-10 PROCEDURE — 73140 X-RAY EXAM OF FINGER(S): CPT | Mod: RT

## 2024-09-10 PROCEDURE — 70450 CT HEAD/BRAIN W/O DYE: CPT

## 2024-09-10 PROCEDURE — 250N000011 HC RX IP 250 OP 636: Performed by: NURSE PRACTITIONER

## 2024-09-10 PROCEDURE — 72125 CT NECK SPINE W/O DYE: CPT

## 2024-09-10 PROCEDURE — 27786 TREATMENT OF ANKLE FRACTURE: CPT | Mod: 54 | Performed by: NURSE PRACTITIONER

## 2024-09-10 PROCEDURE — 250N000013 HC RX MED GY IP 250 OP 250 PS 637: Performed by: NURSE PRACTITIONER

## 2024-09-10 PROCEDURE — 27786 TREATMENT OF ANKLE FRACTURE: CPT

## 2024-09-10 PROCEDURE — 99284 EMERGENCY DEPT VISIT MOD MDM: CPT | Performed by: NURSE PRACTITIONER

## 2024-09-10 PROCEDURE — 73610 X-RAY EXAM OF ANKLE: CPT | Mod: RT

## 2024-09-10 RX ORDER — ACETAMINOPHEN 500 MG
1000 TABLET ORAL EVERY 6 HOURS PRN
Qty: 200 TABLET | Refills: 0 | Status: SHIPPED | OUTPATIENT
Start: 2024-09-10 | End: 2024-09-17

## 2024-09-10 RX ORDER — ONDANSETRON 4 MG/1
4 TABLET, ORALLY DISINTEGRATING ORAL ONCE
Status: COMPLETED | OUTPATIENT
Start: 2024-09-10 | End: 2024-09-10

## 2024-09-10 RX ORDER — ACETAMINOPHEN 500 MG
1000 TABLET ORAL ONCE
Status: COMPLETED | OUTPATIENT
Start: 2024-09-10 | End: 2024-09-10

## 2024-09-10 RX ORDER — ONDANSETRON 8 MG/1
4-8 TABLET, FILM COATED ORAL EVERY 8 HOURS PRN
Qty: 20 TABLET | Refills: 1 | Status: SHIPPED | OUTPATIENT
Start: 2024-09-10

## 2024-09-10 RX ORDER — OXYCODONE HYDROCHLORIDE 5 MG/1
5 TABLET ORAL ONCE
Status: COMPLETED | OUTPATIENT
Start: 2024-09-10 | End: 2024-09-10

## 2024-09-10 RX ORDER — OXYCODONE HYDROCHLORIDE 5 MG/1
5 TABLET ORAL EVERY 6 HOURS PRN
Qty: 12 TABLET | Refills: 0 | Status: SHIPPED | OUTPATIENT
Start: 2024-09-10 | End: 2024-09-13

## 2024-09-10 RX ADMIN — ACETAMINOPHEN 1000 MG: 500 TABLET ORAL at 15:29

## 2024-09-10 RX ADMIN — ONDANSETRON 4 MG: 4 TABLET, ORALLY DISINTEGRATING ORAL at 18:38

## 2024-09-10 RX ADMIN — OXYCODONE HYDROCHLORIDE 5 MG: 5 TABLET ORAL at 18:38

## 2024-09-10 ASSESSMENT — ACTIVITIES OF DAILY LIVING (ADL)
ADLS_ACUITY_SCORE: 35

## 2024-09-10 ASSESSMENT — COLUMBIA-SUICIDE SEVERITY RATING SCALE - C-SSRS
1. IN THE PAST MONTH, HAVE YOU WISHED YOU WERE DEAD OR WISHED YOU COULD GO TO SLEEP AND NOT WAKE UP?: NO
2. HAVE YOU ACTUALLY HAD ANY THOUGHTS OF KILLING YOURSELF IN THE PAST MONTH?: NO
6. HAVE YOU EVER DONE ANYTHING, STARTED TO DO ANYTHING, OR PREPARED TO DO ANYTHING TO END YOUR LIFE?: NO
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
## 2024-09-10 NOTE — ED NOTES
Per provider okay to get up to bedside commode and wheelchair for transport to CT. Patient has c-collar in place.

## 2024-09-11 ENCOUNTER — MYC MEDICAL ADVICE (OUTPATIENT)
Dept: HEMATOLOGY | Facility: CLINIC | Age: 84
End: 2024-09-11
Payer: COMMERCIAL

## 2024-09-11 DIAGNOSIS — S82.209D CLOSED FRACTURE OF TIBIA AND FIBULA WITH ROUTINE HEALING, UNSPECIFIED LATERALITY, SUBSEQUENT ENCOUNTER: Primary | ICD-10-CM

## 2024-09-11 DIAGNOSIS — S82.409D CLOSED FRACTURE OF TIBIA AND FIBULA WITH ROUTINE HEALING, UNSPECIFIED LATERALITY, SUBSEQUENT ENCOUNTER: Primary | ICD-10-CM

## 2024-09-11 DIAGNOSIS — Z86.718 HISTORY OF VENOUS THROMBOEMBOLISM: ICD-10-CM

## 2024-09-11 NOTE — PROGRESS NOTES
Eliquis 2.5mg twice daily to be taken until she is cleared to be out of CAM boot.   EVE NAVAS PA-C on 9/11/2024 at 7:29 AM
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
## 2024-09-11 NOTE — TELEPHONE ENCOUNTER
Called patient and left VM letting her know that a [facility] message was sent with hematology recommendations.     Team will monitor to make sure message is read and patient is aware of recommendations.    Mallory Osborn RN, BSN, PCCN  Nurse Clinician    [facility]  [address] [place] [address], [address], [place] [address]   Office, direct: [phone]  Main office number: [phone]  Pronouns: She, her, hers    Addendum 9/11/2024 4:23 PM:  Incoming call from Janee. RN reviewed information on anticoagulation recommendations-see [facility] message. Patient verbalized understanding.    TWILA HAYES

## 2024-09-12 ENCOUNTER — TRANSFERRED RECORDS (OUTPATIENT)
Dept: HEALTH INFORMATION MANAGEMENT | Facility: CLINIC | Age: 84
End: 2024-09-12
Payer: COMMERCIAL

## 2024-09-18 ENCOUNTER — DOCUMENTATION ONLY (OUTPATIENT)
Dept: ANTICOAGULATION | Facility: CLINIC | Age: 84
End: 2024-09-18
Payer: COMMERCIAL
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
## 2024-10-01 ENCOUNTER — DOCUMENTATION ONLY (OUTPATIENT)
Dept: EMERGENCY MEDICINE | Facility: CLINIC | Age: 84
End: 2024-10-01
Payer: COMMERCIAL

## 2024-10-01 ENCOUNTER — TRANSFERRED RECORDS (OUTPATIENT)
Dept: HEALTH INFORMATION MANAGEMENT | Facility: CLINIC | Age: 84
End: 2024-10-01
Payer: COMMERCIAL

## 2024-10-01 DIAGNOSIS — S82.831A CLOSED FRACTURE OF PROXIMAL END OF RIGHT FIBULA, UNSPECIFIED FRACTURE MORPHOLOGY, INITIAL ENCOUNTER: Primary | ICD-10-CM

## 2024-10-17 ENCOUNTER — OFFICE VISIT (OUTPATIENT)
Dept: FAMILY MEDICINE | Facility: CLINIC | Age: 84
End: 2024-10-17
Payer: COMMERCIAL

## 2024-10-17 VITALS
TEMPERATURE: 97.3 F | DIASTOLIC BLOOD PRESSURE: 88 MMHG | BODY MASS INDEX: 28.7 KG/M2 | WEIGHT: 162 LBS | HEART RATE: 63 BPM | SYSTOLIC BLOOD PRESSURE: 150 MMHG | RESPIRATION RATE: 16 BRPM | HEIGHT: 63 IN | OXYGEN SATURATION: 98 %

## 2024-10-17 DIAGNOSIS — Z13.220 LIPID SCREENING: ICD-10-CM

## 2024-10-17 DIAGNOSIS — Z13.0 SCREENING FOR ENDOCRINE, METABOLIC AND IMMUNITY DISORDER: ICD-10-CM

## 2024-10-17 DIAGNOSIS — Z13.29 SCREENING FOR ENDOCRINE, METABOLIC AND IMMUNITY DISORDER: ICD-10-CM

## 2024-10-17 DIAGNOSIS — S00.03XD HEMATOMA OF SCALP, SUBSEQUENT ENCOUNTER: ICD-10-CM

## 2024-10-17 DIAGNOSIS — Z13.228 SCREENING FOR ENDOCRINE, METABOLIC AND IMMUNITY DISORDER: ICD-10-CM

## 2024-10-17 DIAGNOSIS — I10 ESSENTIAL HYPERTENSION: ICD-10-CM

## 2024-10-17 DIAGNOSIS — R13.19 ESOPHAGEAL DYSPHAGIA: Primary | ICD-10-CM

## 2024-10-17 PROCEDURE — 90662 IIV NO PRSV INCREASED AG IM: CPT | Performed by: FAMILY MEDICINE

## 2024-10-17 PROCEDURE — 90471 IMMUNIZATION ADMIN: CPT | Performed by: FAMILY MEDICINE

## 2024-10-17 PROCEDURE — 99214 OFFICE O/P EST MOD 30 MIN: CPT | Mod: 25 | Performed by: FAMILY MEDICINE

## 2024-10-17 RX ORDER — AMLODIPINE BESYLATE 10 MG/1
10 TABLET ORAL DAILY
Qty: 90 TABLET | Refills: 4 | Status: SHIPPED | OUTPATIENT
Start: 2024-10-17

## 2024-10-17 ASSESSMENT — PAIN SCALES - GENERAL: PAINLEVEL: NO PAIN (0)

## 2024-10-17 NOTE — NURSING NOTE
"Initial There were no vitals taken for this visit. Estimated body mass index is 28.34 kg/m  as calculated from the following:    Height as of 9/10/24: 1.6 m (5' 3\").    Weight as of 9/10/24: 72.6 kg (160 lb). .    "

## 2024-10-17 NOTE — PROGRESS NOTES
"  Assessment & Plan     Esophageal dysphagia  Had dilation of stricture a year ago.  Has had recurrence of dysphagia symptoms.  Will sometimes need to regurgitate food that gets stuck.  Will repeat EGD.  - Adult GI  Referral - Procedure Only; Future    Essential hypertension  Uncontrolled.  Increase amlodipine to 10 mg daily.  Advised her to message in 2 weeks with home blood pressures.  Recheck future labs.  Order placed.  - amLODIPine (NORVASC) 10 MG tablet; Take 1 tablet (10 mg) by mouth daily.  - Basic metabolic panel; Future    Lipid screening  - Lipid panel reflex to direct LDL Fasting; Future    Screening for endocrine, metabolic and immunity disorder  - Basic metabolic panel; Future  - TSH; Future    Scalp hematoma  Resolved.    MED REC REQUIRED  Post Medication Reconciliation Status:   BMI  Estimated body mass index is 28.7 kg/m  as calculated from the following:    Height as of this encounter: 1.6 m (5' 3\").    Weight as of this encounter: 73.5 kg (162 lb).             Pablo Weller is a 84 year old, presenting for the following health issues:  No chief complaint on file.        5/15/2024    11:37 AM   Additional Questions   Roomed by [facility]       ED/UC Followup:    Facility:  [facility]  Date of visit: 9/10/2024  Reason for visit: Head Injury  Current Status: improved      ROS:   Constitutional, neuro, ENT, endocrine, pulmonary, cardiac, gastrointestinal, genitourinary, musculoskeletal, integument and psychiatric systems are negative, except as otherwise noted.       Objective    BP (!) 150/88   Pulse 63   Temp 97.3  F (36.3  C) (Tympanic)   Resp 16   Ht 1.6 m (5' 3\")   Wt 73.5 kg (162 lb)   SpO2 98%   BMI 28.70 kg/m    Body mass index is 28.7 kg/m .  Physical Exam   GENERAL: Pleasant, well appearing female.  Scalp hematoma resolved.    CV: RRR, no murmurs, rubs or gallops.              Signed Electronically by: Katy Rodriguez MD    "

## 2024-10-22 ENCOUNTER — TRANSFERRED RECORDS (OUTPATIENT)
Dept: HEALTH INFORMATION MANAGEMENT | Facility: CLINIC | Age: 84
End: 2024-10-22
Payer: COMMERCIAL

## 2024-11-04 ENCOUNTER — OFFICE VISIT (OUTPATIENT)
Dept: DERMATOLOGY | Facility: CLINIC | Age: 84
End: 2024-11-04
Payer: COMMERCIAL

## 2024-11-04 DIAGNOSIS — L72.0 EPIDERMAL CYST: Primary | ICD-10-CM

## 2024-11-04 PROCEDURE — 11403 EXC TR-EXT B9+MARG 2.1-3CM: CPT | Performed by: DERMATOLOGY

## 2024-11-04 PROCEDURE — 12031 INTMD RPR S/A/T/EXT 2.5 CM/<: CPT | Performed by: DERMATOLOGY

## 2024-11-04 PROCEDURE — 88331 PATH CONSLTJ SURG 1 BLK 1SPC: CPT | Performed by: DERMATOLOGY

## 2024-11-04 NOTE — PROGRESS NOTES
Marlene Millan is an extremely pleasant 84 year old year old female patient here today for evaluation and managment of cyst on back.  Patient has no other skin complaints today.  Remainder of the HPI, Meds, PMH, Allergies, FH, and SH was reviewed in chart.      Past Medical History:   Diagnosis Date    Basal cell carcinoma     Hypertension 06/07/2010    Macular degeneration     PONV (postoperative nausea and vomiting)     Pulmonary embolism (H)     Pulmonary embolism, bilateral (H) 02/19/2012    Post-surgical at [facility] following total knee replacement     Shingles outbreak 12/2016    Squamous cell carcinoma of skin, unspecified        Past Surgical History:   Procedure Laterality Date    basal cell cancer of nose  Oct 2012    BREAST SURGERY      breast reduction    CARPAL TUNNEL RELEASE RT/LT      right    COLONOSCOPY N/A 1/10/2023    Procedure: COLONOSCOPY with cold polypectomies;  Surgeon: Sachin Tan DO;  Location: [facility] OR    COMBINED REPAIR PTOSIS WITH BLEPHAROPLASTY BILATERAL Bilateral 6/30/2015    Procedure: COMBINED REPAIR PTOSIS WITH BLEPHAROPLASTY BILATERAL;  Surgeon: Ilir Marvin MD;  Location: [facility]    ESOPHAGOSCOPY, GASTROSCOPY, DUODENOSCOPY (EGD), COMBINED N/A 1/10/2023    Procedure: ESOPHAGOGASTRODUODENOSCOPY WITH biopsies and 45 Malagasy Savary Dilation;  Surgeon: Sachin Tan DO;  Location: [facility] OR    HC REMOVAL GALLBLADDER      LIGATE VEIN VARICOSE, PHLEBECTOMY MULTIPLE STAB, COMBINED Bilateral 8/13/2015    Procedure: COMBINED LIGATE VEIN VARICOSE, PHLEBECTOMY MULTIPLE STAB;  Surgeon: Alphonse Pro MD;  Location: [facility] OR    ORTHOPEDIC SURGERY      bilateral knee    PHACOEMULSIFICATION WITH STANDARD INTRAOCULAR LENS IMPLANT Right 12/21/2017    Procedure: PHACOEMULSIFICATION WITH STANDARD INTRAOCULAR LENS IMPLANT;  Right Cataract Removal with Implant;  Surgeon: Clif Michel MD;  Location: [facility] OR    SURGICAL HISTORY OF -       breast reduction  mammoplasty     SURGICAL HISTORY OF -       bilateral lower extremity vein stripping    SURGICAL HISTORY OF -   2010    left total knee arthroplasty        Family History   Problem Relation Age of Onset    Diabetes Father         last both legs to the disease    Heart Disease Father          age 91       Social History     Socioeconomic History    Marital status:      Spouse name: Not on file    Number of children: Not on file    Years of education: Not on file    Highest education level: Not on file   Occupational History    Not on file   Tobacco Use    Smoking status: Former     Current packs/day: 0.00     Types: Cigarettes     Start date: 1962     Quit date: 1982     Years since quittin.7    Smokeless tobacco: Never   Vaping Use    Vaping status: Never Used   Substance and Sexual Activity    Alcohol use: Yes     Comment: occ wine    Drug use: No    Sexual activity: Not Currently   Other Topics Concern    Parent/sibling w/ CABG, MI or angioplasty before 65F 55M? No   Social History Narrative    Not on file     Social Drivers of Health     Financial Resource Strain: Low Risk  (3/13/2024)    Financial Resource Strain     Within the past 12 months, have you or your family members you live with been unable to get utilities (heat, electricity) when it was really needed?: No   Food Insecurity: Low Risk  (3/13/2024)    Food Insecurity     Within the past 12 months, did you worry that your food would run out before you got money to buy more?: No     Within the past 12 months, did the food you bought just not last and you didn t have money to get more?: No   Transportation Needs: Low Risk  (3/13/2024)    Transportation Needs     Within the past 12 months, has lack of transportation kept you from medical appointments, getting your medicines, non-medical meetings or appointments, work, or from getting things that you need?: No   Physical Activity: Sufficiently Active (3/13/2024)     Exercise Vital Sign     Days of Exercise per Week: 7 days     Minutes of Exercise per Session: 30 min   Stress: No Stress Concern Present (3/13/2024)    [employer] - Occupational Stress Questionnaire     Feeling of Stress : Not at all   Social Connections: Unknown (3/13/2024)    Social Connection and Isolation Panel [NHANES]     Frequency of Communication with Friends and Family: Not on file     Frequency of Social Gatherings with Friends and Family: Twice a week     Attends Faith Services: Not on file     Active Member of Clubs or Organizations: Not on file     Attends Club or Organization Meetings: Not on file     Marital Status: Not on file   Interpersonal Safety: Low Risk  (10/17/2024)    Interpersonal Safety     Do you feel physically and emotionally safe where you currently live?: Yes     Within the past 12 months, have you been hit, slapped, kicked or otherwise physically hurt by someone?: No     Within the past 12 months, have you been humiliated or emotionally abused in other ways by your partner or ex-partner?: No   Housing Stability: Low Risk  (3/13/2024)    Housing Stability     Do you have housing? : Yes     Are you worried about losing your housing?: No       Outpatient Encounter Medications as of 11/4/2024   Medication Sig Dispense Refill    amLODIPine (NORVASC) 10 MG tablet Take 1 tablet (10 mg) by mouth daily. 90 tablet 4    atorvastatin (LIPITOR) 40 MG tablet Take 1 tablet (40 mg) by mouth daily 90 tablet 3    Cholecalciferol (VITAMIN D3) 25 MCG (1000 UT) CAPS Take 1 capsule by mouth daily      diclofenac (VOLTAREN) 1 % topical gel Apply 2 g topically 4 times daily 50 g 0    minoxidil (LONITEN) 2.5 MG tablet 1/4 tabelt daily 60 tablet 3    Multiple Vitamins-Minerals (HAIR SKIN AND NAILS FORMULA PO) Take 2 chew tab by mouth      Multiple Vitamins-Minerals (MACULAR VITAMIN BENEFIT PO) Take 1 tablet by mouth daily Focus MaculaPro      ondansetron (ZOFRAN) 8 MG tablet  Take 0.5-1 tablets (4-8 mg) by mouth every 8 hours as needed for nausea. 20 tablet 1    tiZANidine (ZANAFLEX) 2 MG tablet TAKE 1 TABLET (2 MG) BY MOUTH NIGHTLY AS NEEDED FOR MUSCLE SPASMS 10 tablet 0    valACYclovir (VALTREX) 1000 mg tablet 2 tabs at onset of cold sores. Repeat dose after 12 hours. 12 tablet 4     No facility-administered encounter medications on file as of 11/4/2024.             O:   NAD, WDWN, Alert & Oriented, Mood & Affect wnl, Vitals stable   General appearance normal   Vitals stable   Alert, oriented and in no acute distress     Mid upper back 2.5cm nodule      Eyes: Conjunctivae/lids:Normal     ENT: Lips, mucosa: normal    MSK:Normal    Cardiovascular: peripheral edema none    Pulm: Breathing Normal    Neuro/Psych: Orientation:Alert and Orientedx3 ; Mood/Affect:normal       MICRO:   Mid upper back: cyst lined by stratified squamous epithelium with epidermal keratinization   A/P:  Mid upper back cyst   EXCISION OF CYST AND it : After thorough discussion of PGACAC, consent obtained, anesthesia and prep, the margins of the cyst were identified and an elliptical incision was made encompassing the cyst. The incisions were made through the skin and down to and including the subcutaneous tissue. The cyst was removed en bloc and submitted for frozen pathologic review. hemostasis was achieved. The wound edges were then closed in a layered fashion, being careful not to leave any dead space. Postoperative length was 1.3 cm.   EBL minimal; complications none; wound care routine. The patient was discharged in good condition and will return in one week for wound evaluation.  It was a pleasure speaking to Marlene Millan today.

## 2024-11-04 NOTE — LETTER
11/4/2024      Marlene Millan  [address] [address] [facility] [address] [place] [address]      Dear Colleague,    Thank you for referring your patient, Marlene Millan, to the [facility]. Please see a copy of my visit note below.    Marlene Millan is an extremely pleasant 84 year old year old female patient here today for evaluation and managment of cyst on back.  Patient has no other skin complaints today.  Remainder of the HPI, Meds, PMH, Allergies, FH, and SH was reviewed in chart.      Past Medical History:   Diagnosis Date     Basal cell carcinoma      Hypertension 06/07/2010     Macular degeneration      PONV (postoperative nausea and vomiting)      Pulmonary embolism (H)      Pulmonary embolism, bilateral (H) 02/19/2012    Post-surgical at [facility] following total knee replacement      Shingles outbreak 12/2016     Squamous cell carcinoma of skin, unspecified        Past Surgical History:   Procedure Laterality Date     basal cell cancer of nose  Oct 2012     BREAST SURGERY      breast reduction     CARPAL TUNNEL RELEASE RT/LT      right     COLONOSCOPY N/A 1/10/2023    Procedure: COLONOSCOPY with cold polypectomies;  Surgeon: Sachin Tan DO;  Location: [facility] OR     COMBINED REPAIR PTOSIS WITH BLEPHAROPLASTY BILATERAL Bilateral 6/30/2015    Procedure: COMBINED REPAIR PTOSIS WITH BLEPHAROPLASTY BILATERAL;  Surgeon: Ilir Marvin MD;  Location: [facility]     ESOPHAGOSCOPY, GASTROSCOPY, DUODENOSCOPY (EGD), COMBINED N/A 1/10/2023    Procedure: ESOPHAGOGASTRODUODENOSCOPY WITH biopsies and 45 Puerto Rican Savary Dilation;  Surgeon: Sachin Tan DO;  Location: [facility] OR     HC REMOVAL GALLBLADDER       LIGATE VEIN VARICOSE, PHLEBECTOMY MULTIPLE STAB, COMBINED Bilateral 8/13/2015    Procedure: COMBINED LIGATE VEIN VARICOSE, PHLEBECTOMY MULTIPLE STAB;  Surgeon: Alphonse Pro MD;  Location: [facility] OR     ORTHOPEDIC SURGERY      bilateral knee     PHACOEMULSIFICATION WITH  STANDARD INTRAOCULAR LENS IMPLANT Right 2017    Procedure: PHACOEMULSIFICATION WITH STANDARD INTRAOCULAR LENS IMPLANT;  Right Cataract Removal with Implant;  Surgeon: Clif Michel MD;  Location: [facility] OR     SURGICAL HISTORY OF -       breast reduction mammoplasty      SURGICAL HISTORY OF -       bilateral lower extremity vein stripping     SURGICAL HISTORY OF -   2010    left total knee arthroplasty        Family History   Problem Relation Age of Onset     Diabetes Father         last both legs to the disease     Heart Disease Father          age 91       Social History     Socioeconomic History     Marital status:      Spouse name: Not on file     Number of children: Not on file     Years of education: Not on file     Highest education level: Not on file   Occupational History     Not on file   Tobacco Use     Smoking status: Former     Current packs/day: 0.00     Types: Cigarettes     Start date: 1962     Quit date: 1982     Years since quittin.7     Smokeless tobacco: Never   Vaping Use     Vaping status: Never Used   Substance and Sexual Activity     Alcohol use: Yes     Comment: occ wine     Drug use: No     Sexual activity: Not Currently   Other Topics Concern     Parent/sibling w/ CABG, MI or angioplasty before 65F 55M? No   Social History Narrative     Not on file     Social Drivers of Health     Financial Resource Strain: Low Risk  (3/13/2024)    Financial Resource Strain      Within the past 12 months, have you or your family members you live with been unable to get utilities (heat, electricity) when it was really needed?: No   Food Insecurity: Low Risk  (3/13/2024)    Food Insecurity      Within the past 12 months, did you worry that your food would run out before you got money to buy more?: No      Within the past 12 months, did the food you bought just not last and you didn t have money to get more?: No   Transportation Needs: Low Risk  (3/13/2024)     Transportation Needs      Within the past 12 months, has lack of transportation kept you from medical appointments, getting your medicines, non-medical meetings or appointments, work, or from getting things that you need?: No   Physical Activity: Sufficiently Active (3/13/2024)    Exercise Vital Sign      Days of Exercise per Week: 7 days      Minutes of Exercise per Session: 30 min   Stress: No Stress Concern Present (3/13/2024)    [employer] [place] [employer] - Occupational Stress Questionnaire      Feeling of Stress : Not at all   Social Connections: Unknown (3/13/2024)    Social Connection and Isolation Panel [NHANES]      Frequency of Communication with Friends and Family: Not on file      Frequency of Social Gatherings with Friends and Family: Twice a week      Attends Muslim Services: Not on file      Active Member of Clubs or Organizations: Not on file      Attends Club or Organization Meetings: Not on file      Marital Status: Not on file   Interpersonal Safety: Low Risk  (10/17/2024)    Interpersonal Safety      Do you feel physically and emotionally safe where you currently live?: Yes      Within the past 12 months, have you been hit, slapped, kicked or otherwise physically hurt by someone?: No      Within the past 12 months, have you been humiliated or emotionally abused in other ways by your partner or ex-partner?: No   Housing Stability: Low Risk  (3/13/2024)    Housing Stability      Do you have housing? : Yes      Are you worried about losing your housing?: No       Outpatient Encounter Medications as of 11/4/2024   Medication Sig Dispense Refill     amLODIPine (NORVASC) 10 MG tablet Take 1 tablet (10 mg) by mouth daily. 90 tablet 4     atorvastatin (LIPITOR) 40 MG tablet Take 1 tablet (40 mg) by mouth daily 90 tablet 3     Cholecalciferol (VITAMIN D3) 25 MCG (1000 UT) CAPS Take 1 capsule by mouth daily       diclofenac (VOLTAREN) 1 % topical gel Apply 2 g topically 4 times daily 50 g  0     minoxidil (LONITEN) 2.5 MG tablet 1/4 tabelt daily 60 tablet 3     Multiple Vitamins-Minerals (HAIR SKIN AND NAILS FORMULA PO) Take 2 chew tab by mouth       Multiple Vitamins-Minerals (MACULAR VITAMIN BENEFIT PO) Take 1 tablet by mouth daily Focus MaculaPro       ondansetron (ZOFRAN) 8 MG tablet Take 0.5-1 tablets (4-8 mg) by mouth every 8 hours as needed for nausea. 20 tablet 1     tiZANidine (ZANAFLEX) 2 MG tablet TAKE 1 TABLET (2 MG) BY MOUTH NIGHTLY AS NEEDED FOR MUSCLE SPASMS 10 tablet 0     valACYclovir (VALTREX) 1000 mg tablet 2 tabs at onset of cold sores. Repeat dose after 12 hours. 12 tablet 4     No facility-administered encounter medications on file as of 11/4/2024.             O:   NAD, WDWN, Alert & Oriented, Mood & Affect wnl, Vitals stable   General appearance normal   Vitals stable   Alert, oriented and in no acute distress     Mid upper back 2.5cm nodule      Eyes: Conjunctivae/lids:Normal     ENT: Lips, mucosa: normal    MSK:Normal    Cardiovascular: peripheral edema none    Pulm: Breathing Normal    Neuro/Psych: Orientation:Alert and Orientedx3 ; Mood/Affect:normal       MICRO:   Mid upper back: cyst lined by stratified squamous epithelium with epidermal keratinization   A/P:  Mid upper back cyst   EXCISION OF CYST AND it : After thorough discussion of PGACAC, consent obtained, anesthesia and prep, the margins of the cyst were identified and an elliptical incision was made encompassing the cyst. The incisions were made through the skin and down to and including the subcutaneous tissue. The cyst was removed en bloc and submitted for frozen pathologic review. hemostasis was achieved. The wound edges were then closed in a layered fashion, being careful not to leave any dead space. Postoperative length was 1.3 cm.   EBL minimal; complications none; wound care routine. The patient was discharged in good condition and will return in one week for wound evaluation.  It was a pleasure speaking to  Marlene Millan today.      Again, thank you for allowing me to participate in the care of your patient.        Sincerely,        Errol Anderson MD

## 2024-11-04 NOTE — PATIENT INSTRUCTIONS
Sutured Wound Care     [facility]: [phone]    [facility]: [phone]    Right upper back      No strenuous activity for 48 hours. Resume moderate activity in 48 hours. No heavy exercising until you are seen for follow up in one week.     Take Tylenol as needed for discomfort.                         Do not drink alcoholic beverages for 48 hours.     Keep the pressure bandage in place for 24 hours. If the bandage becomes blood tinged or loose, reinforce it with gauze and tape.        (Refer to the reverse side of this page for management of bleeding).    Remove pressure bandage in 24 hours (White Tape)     Leave the flat bandage in place until your follow up appointment. (BlueTape)     Keep the bandage dry. Wash around it carefully.    If the tape becomes soiled or starts to come off, reinforce it with additional paper tape.    Do not smoke for 3 weeks; smoking is detrimental to wound healing.    It is normal to have swelling and bruising around the surgical site. The bruising will fade in approximately 10-14 days. Elevate the area to reduce swelling.    Numbness, itchiness and sensitivity to temperature changes can occur after surgery and may take up to 18 months to normalize.      POSSIBLE COMPLICATIONS    BLEEDING:    Leave the bandage in place.  Use tightly rolled up gauze or a cloth to apply direct pressure over the bandage for 20   minutes.  Reapply pressure for an additional 20 minutes if necessary  Call the office or go to the nearest emergency room if pressure fails to stop the bleeding.  Use additional gauze and tape to maintain pressure once the bleeding has stopped.        PAIN:    Post operative pain should slowly get better, never worse.  A severe increase in pain may indicate a problem. Call the office if this occurs.    In case of emergency phone:Dr Anderson [phone]           Proper skin care from [facility]:    -Eliminate harsh soaps as they strip the natural  oils from the skin, often resulting in dry itchy skin ( i.e. Dial, Zest, [place] Spring)  -Use mild soaps such as Cetaphil or Dove Sensitive Skin in the shower. You do not need to use soap on arms, legs, and trunk every time you shower unless visibly soiled.   -Avoid hot or cold showers.  -After showering, lightly dry off and apply moisturizing within 2-3 minutes. This will help trap moisture in the skin.   -Aggressive use of a moisturizer at least 1-2 times a day to the entire body (including -Vanicream, Cetaphil, Aquaphor or Cerave) and moisturize hands after every washing.  -We recommend using moisturizers that come in a tub that needs to be scooped out, not a pump. This has more of an oil base. It will hold moisture in your skin much better than a water base moisturizer. The above recommended are non-pore clogging.      Wear a sunscreen with at least SPF 30 on your face, ears, neck and V of the chest daily. Wear sunscreen on other areas of the body if those areas are exposed to the sun throughout the day. Sunscreens can contain physical and/or chemical blockers. Physical blockers are less likely to clog pores, these include zinc oxide and titanium dioxide. Reapply every two hour and after swimming.     Sunscreen examples: https://www.ewg.org/sunscreen/    UV radiation  UVA radiation remains constant throughout the day and throughout the year. It is a longer wavelength than UVB and therefore penetrates deeper into the skin leading to immediate and delayed tanning, photoaging, and skin cancer. 70-80% of UVA and UVB radiation occurs between the hours of 10am-2pm.  UVB radiation  UVB radiation causes the most harmful effects and is more significant during the summer months. However, snow and ice can reflect UVB radiation leading to skin damage during the winter months as well. UVB radiation is responsible for tanning, burning, inflammation, delayed erythema (pinkness), pigmentation (brown spots), and skin cancer.      I recommend self monthly full body exams and yearly full body exams with a dermatology provider. If you develop a new or changing lesion please follow up for examination. Most skin cancers are pink and scaly or pink and pearly. However, we do see blue/brown/black skin cancers.  Consider the ABCDEs of melanoma when giving yourself your monthly full body exam ( don't forget the groin, buttocks, feet, toes, etc). A-asymmetry, B-borders, C-color, D-diameter, E-elevation or evolving. If you see any of these changes please follow up in clinic. If you cannot see your back I recommend purchasing a hand held mirror to use with a larger wall mirror.       Checking for Skin Cancer  You can find cancer early by checking your skin each month. There are 3 kinds of skin cancer. They are melanoma, basal cell carcinoma, and squamous cell carcinoma. Doing monthly skin checks is the best way to find new marks or skin changes. Follow the instructions below for checking your skin.   The ABCDEs of checking moles for melanoma   Check your moles or growths for signs of melanoma using ABCDE:   Asymmetry: the sides of the mole or growth don t match  Border: the edges are ragged, notched, or blurred  Color: the color within the mole or growth varies  Diameter: the mole or growth is larger than 6 mm (size of a pencil eraser)  Evolving: the size, shape, or color of the mole or growth is changing (evolving is not shown in the images below)    Checking for other types of skin cancer  Basal cell carcinoma or squamous cell carcinoma have symptoms such as:     A spot or mole that looks different from all other marks on your skin  Changes in how an area feels, such as itching, tenderness, or pain  Changes in the skin's surface, such as oozing, bleeding, or scaliness  A sore that does not heal  New swelling or redness beyond the border of a mole    Who s at risk?  Anyone can get skin cancer. But you are at greater risk if you have:   Fair skin,  light-colored hair, or light-colored eyes  Many moles or abnormal moles on your skin  A history of sunburns from sunlight or tanning beds  A family history of skin cancer  A history of exposure to radiation or chemicals  A weakened immune system  If you have had skin cancer in the past, you are at risk for recurring skin cancer.   How to check your skin  Do your monthly skin checkups in front of a full-length mirror. Check all parts of your body, including your:   Head (ears, face, neck, and scalp)  Torso (front, back, and sides)  Arms (tops, undersides, upper, and lower armpits)  Hands (palms, backs, and fingers, including under the nails)  Buttocks and genitals  Legs (front, back, and sides)  Feet (tops, soles, toes, including under the nails, and between toes)  If you have a lot of moles, take digital photos of them each month. Make sure to take photos both up close and from a distance. These can help you see if any moles change over time.   Most skin changes are not cancer. But if you see any changes in your skin, call your doctor right away. Only he or she can diagnose a problem. If you have skin cancer, seeing your doctor can be the first step toward getting the treatment that could save your life.   [employer] last reviewed this educational content on 4/1/2019 2000-2020 The for; to (do). [address] [place] [address]. All rights reserved. This information is not intended as a substitute for professional medical care. Always follow your healthcare professional's instructions.       When should I call my doctor?  If you are worsening or not improving, please, contact us or seek urgent care as noted below.     Who should I call with questions (adults)?    [facility] [phone]  For urgent needs outside of business hours call the [facility] at [phone] and ask for the dermatology resident on call to be paged  If this is a medical emergency and you  are unable to reach an ER, Call 911      If you need a prescription refill, please contact your pharmacy. Refills are approved or denied by our Physicians during normal business hours, Monday through Fridays  Per office policy, refills will not be granted if you have not been seen within the past year (or sooner depending on your child's condition)

## 2024-11-11 ENCOUNTER — OFFICE VISIT (OUTPATIENT)
Dept: DERMATOLOGY | Facility: CLINIC | Age: 84
End: 2024-11-11
Payer: COMMERCIAL

## 2024-11-11 DIAGNOSIS — Z48.00 ENCOUNTER FOR CHANGE OF DRESSING: Primary | ICD-10-CM

## 2024-11-11 PROCEDURE — 99207 PR NO CHARGE NURSE ONLY: CPT

## 2024-11-11 NOTE — PATIENT INSTRUCTIONS
WOUND CARE INSTRUCTIONS  for  ONE WEEK AFTER SURGERY          Leave flat bandage on your skin for one week after today s bandage change.   ON 11/18 when you remove the bandage, you may resume your regular skin care routine, including washing with mild soap and water, applying moisturizer, make-up and sunscreen.    If there are any open or bleeding areas at the incision/graft site you should begin to cover the area with a bandage daily as follows:    Clean and dry the area with plain tap water using a Q-tip or sterile gauze pad.  Apply Polysporin or Bacitracin ointment to the open area.  Cover the wound with a band-aid or a sterile non-stick gauze pad and micropore paper tape.         SIGNS OF INFECTION  - If you notice any of these signs of infection, call your doctor right away: expanding redness around the wound.  - Yellow or greenish-colored pus or cloudy wound drainage.    - Red streaking spreading from the wound.  - Increased swelling, tenderness, or pain around the wound.   - Fever.    Please remember that yellow and clear drainage from a wound can be normal and related to normal wound healing.  Isolated drainage from a wound without a combination of the above features does not indicate infection.       *Once the bandages are removed, the scar will be red and firm (especially in the lip/chin area). This is normal and will fade in time. It might take 6-12 months for this to happen.     *Massaging the area will help the scar soften and fade quicker. Begin to massage the area one month after the bandages have been removed. To massage apply pressure directly and firmly over the scar with the fingertips and move in a circular motion. Massage the area for a few minutes several times a day. Continue to massage the site for several months.    *Approximately 6-8 weeks after surgery it is not uncommon to see the formation of  tender pimple-like  bump along the scar. This is normal. As the scar continues to mature and  the stitches underneath the skin begin to dissolve, this might occur. Do not pick or squeeze, this will resolve on it s own. Should one break open producing a small amount of drainage, apply Polysporin or Bacitracin ointment a few times a day until the wound is completely healed.    *Numbness in the surgical area is expected. It might take 12-18 months for the feeling to return to normal. During this time sensations of itchiness, tingling and occasional sharp pains might be noted. These feelings are normal and will subside once the nerves have completely healed.         IN CASE OF EMERGENCY: Dr Anderson [phone]     If you were seen in [place] call: [phone]    If you were seen in [place] call: [phone]

## 2024-11-11 NOTE — PROGRESS NOTES
Marlene Millan comes into clinic today at the request of Monica Ordering Provider for Wound Check Action taken: See Below.    This service provided today was under the supervising provider of the day Monica, who was available if needed.    Pt returned to clinic for post surgery 1 week follow up bandage change. Pt has no complaints, denies pain. Bandage removed from Back, area cleansed with normal saline. Site is healing and wound edges approximating well. Reapplied new steri strips and paper tape.    Advised to watch for signs/sx of infection; spreading redness, drainage, odor, fever. Call or report promptly to clinic. Pt given written instructions and informed to rtc as needed. Patient verbalized understanding.       Reina Alvarado MA

## 2024-11-21 DIAGNOSIS — L64.9 ANDROGENETIC ALOPECIA: ICD-10-CM

## 2024-11-21 RX ORDER — MINOXIDIL 2.5 MG/1
TABLET ORAL
Qty: 60 TABLET | Refills: 3 | Status: SHIPPED | OUTPATIENT
Start: 2024-11-21

## 2024-11-21 NOTE — TELEPHONE ENCOUNTER
Requested Prescriptions   Pending Prescriptions Disp Refills    minoxidil (LONITEN) 2.5 MG tablet 60 tablet 3     Si/4 tabelt daily       There is no refill protocol information for this order          Last office visit: 2024 ; last virtual visit: Visit date not found with prescribing provider:  Errol Anderson      Future Office Visit:      Melita Pyle   [facility] [place]   [facility]  [phone]

## 2025-02-06 ENCOUNTER — TRANSFERRED RECORDS (OUTPATIENT)
Dept: HEALTH INFORMATION MANAGEMENT | Facility: CLINIC | Age: 85
End: 2025-02-06
Payer: COMMERCIAL

## 2025-02-11 ENCOUNTER — PATIENT OUTREACH (OUTPATIENT)
Dept: CARE COORDINATION | Facility: CLINIC | Age: 85
End: 2025-02-11
Payer: COMMERCIAL

## 2025-02-18 ENCOUNTER — OFFICE VISIT (OUTPATIENT)
Dept: DERMATOLOGY | Facility: CLINIC | Age: 85
End: 2025-02-18
Payer: COMMERCIAL

## 2025-02-18 DIAGNOSIS — L81.4 LENTIGO: ICD-10-CM

## 2025-02-18 DIAGNOSIS — L82.0 INFLAMED SEBORRHEIC KERATOSIS: ICD-10-CM

## 2025-02-18 DIAGNOSIS — D23.9 DERMAL NEVUS: Primary | ICD-10-CM

## 2025-02-18 DIAGNOSIS — D18.01 ANGIOMA OF SKIN: ICD-10-CM

## 2025-02-18 DIAGNOSIS — L82.1 SEBORRHEIC KERATOSES: ICD-10-CM

## 2025-02-18 PROCEDURE — 17110 DESTRUCTION B9 LES UP TO 14: CPT | Performed by: DERMATOLOGY

## 2025-02-18 PROCEDURE — 99213 OFFICE O/P EST LOW 20 MIN: CPT | Mod: 25 | Performed by: DERMATOLOGY

## 2025-02-18 NOTE — LETTER
2/18/2025      Marlene Millan  [address] [address] [place] [address]      Dear Colleague,    Thank you for referring your patient, Marlene Millan, to the [facility]. Please see a copy of my visit note below.    Marlene Millan is an extremely pleasant 84 year old year old female patient here today for itching spots on neck and back.  Patient has no other skin complaints today.  Remainder of the HPI, Meds, PMH, Allergies, FH, and SH was reviewed in chart.      Past Medical History:   Diagnosis Date     Basal cell carcinoma      Hypertension 06/07/2010     Macular degeneration      PONV (postoperative nausea and vomiting)      Pulmonary embolism (H)      Pulmonary embolism, bilateral (H) 02/19/2012    Post-surgical at [facility] following total knee replacement      Shingles outbreak 12/2016     Squamous cell carcinoma of skin, unspecified        Past Surgical History:   Procedure Laterality Date     basal cell cancer of nose  Oct 2012     BREAST SURGERY      breast reduction     CARPAL TUNNEL RELEASE RT/LT      right     COLONOSCOPY N/A 1/10/2023    Procedure: COLONOSCOPY with cold polypectomies;  Surgeon: Sachin Tan DO;  Location: [facility] OR     COMBINED REPAIR PTOSIS WITH BLEPHAROPLASTY BILATERAL Bilateral 6/30/2015    Procedure: COMBINED REPAIR PTOSIS WITH BLEPHAROPLASTY BILATERAL;  Surgeon: Ilir Marvin MD;  Location: [facility]     ESOPHAGOSCOPY, GASTROSCOPY, DUODENOSCOPY (EGD), COMBINED N/A 1/10/2023    Procedure: ESOPHAGOGASTRODUODENOSCOPY WITH biopsies and 45 Jordanian Savary Dilation;  Surgeon: Sachin Tan DO;  Location: [facility] OR     HC REMOVAL GALLBLADDER       LIGATE VEIN VARICOSE, PHLEBECTOMY MULTIPLE STAB, COMBINED Bilateral 8/13/2015    Procedure: COMBINED LIGATE VEIN VARICOSE, PHLEBECTOMY MULTIPLE STAB;  Surgeon: Alphonse Pro MD;  Location: [facility] OR     ORTHOPEDIC SURGERY      bilateral knee     PHACOEMULSIFICATION WITH STANDARD  INTRAOCULAR LENS IMPLANT Right 2017    Procedure: PHACOEMULSIFICATION WITH STANDARD INTRAOCULAR LENS IMPLANT;  Right Cataract Removal with Implant;  Surgeon: Clif Michel MD;  Location: [facility] OR     SURGICAL HISTORY OF -       breast reduction mammoplasty      SURGICAL HISTORY OF -       bilateral lower extremity vein stripping     SURGICAL HISTORY OF -   2010    left total knee arthroplasty        Family History   Problem Relation Age of Onset     Diabetes Father         last both legs to the disease     Heart Disease Father          age 91       Social History     Socioeconomic History     Marital status:      Spouse name: Not on file     Number of children: Not on file     Years of education: Not on file     Highest education level: Not on file   Occupational History     Not on file   Tobacco Use     Smoking status: Former     Current packs/day: 0.00     Types: Cigarettes     Start date: 1962     Quit date: 1982     Years since quittin.0     Smokeless tobacco: Never   Vaping Use     Vaping status: Never Used   Substance and Sexual Activity     Alcohol use: Yes     Comment: occ wine     Drug use: No     Sexual activity: Not Currently   Other Topics Concern     Parent/sibling w/ CABG, MI or angioplasty before 65F 55M? No   Social History Narrative     Not on file     Social Drivers of Health     Financial Resource Strain: Low Risk  (3/13/2024)    Financial Resource Strain      Within the past 12 months, have you or your family members you live with been unable to get utilities (heat, electricity) when it was really needed?: No   Food Insecurity: Low Risk  (3/13/2024)    Food Insecurity      Within the past 12 months, did you worry that your food would run out before you got money to buy more?: No      Within the past 12 months, did the food you bought just not last and you didn t have money to get more?: No   Transportation Needs: Low Risk  (3/13/2024)    Transportation  Needs      Within the past 12 months, has lack of transportation kept you from medical appointments, getting your medicines, non-medical meetings or appointments, work, or from getting things that you need?: No   Physical Activity: Sufficiently Active (3/13/2024)    Exercise Vital Sign      Days of Exercise per Week: 7 days      Minutes of Exercise per Session: 30 min   Stress: No Stress Concern Present (3/13/2024)    [place] [employer] - Occupational Stress Questionnaire      Feeling of Stress : Not at all   Social Connections: Unknown (3/13/2024)    Social Connection and Isolation Panel [NHANES]      Frequency of Communication with Friends and Family: Not on file      Frequency of Social Gatherings with Friends and Family: Twice a week      Attends Gnosticism Services: Not on file      Active Member of Clubs or Organizations: Not on file      Attends Club or Organization Meetings: Not on file      Marital Status: Not on file   Interpersonal Safety: Low Risk  (10/17/2024)    Interpersonal Safety      Do you feel physically and emotionally safe where you currently live?: Yes      Within the past 12 months, have you been hit, slapped, kicked or otherwise physically hurt by someone?: No      Within the past 12 months, have you been humiliated or emotionally abused in other ways by your partner or ex-partner?: No   Housing Stability: Low Risk  (3/13/2024)    Housing Stability      Do you have housing? : Yes      Are you worried about losing your housing?: No       Outpatient Encounter Medications as of 2/18/2025   Medication Sig Dispense Refill     amLODIPine (NORVASC) 10 MG tablet Take 1 tablet (10 mg) by mouth daily. 90 tablet 4     atorvastatin (LIPITOR) 40 MG tablet Take 1 tablet (40 mg) by mouth daily 90 tablet 3     Cholecalciferol (VITAMIN D3) 25 MCG (1000 UT) CAPS Take 1 capsule by mouth daily       diclofenac (VOLTAREN) 1 % topical gel Apply 2 g topically 4 times daily 50 g 0     minoxidil  (LONITEN) 2.5 MG tablet 1/4 tabelt daily 60 tablet 3     Multiple Vitamins-Minerals (HAIR SKIN AND NAILS FORMULA PO) Take 2 chew tab by mouth       Multiple Vitamins-Minerals (MACULAR VITAMIN BENEFIT PO) Take 1 tablet by mouth daily Focus MaculaPro       ondansetron (ZOFRAN) 8 MG tablet Take 0.5-1 tablets (4-8 mg) by mouth every 8 hours as needed for nausea. 20 tablet 1     tiZANidine (ZANAFLEX) 2 MG tablet TAKE 1 TABLET (2 MG) BY MOUTH NIGHTLY AS NEEDED FOR MUSCLE SPASMS 10 tablet 0     valACYclovir (VALTREX) 1000 mg tablet 2 tabs at onset of cold sores. Repeat dose after 12 hours. 12 tablet 4     No facility-administered encounter medications on file as of 2/18/2025.             O:   NAD, WDWN, Alert & Oriented, Mood & Affect wnl, Vitals stable   General appearance normal   Vitals stable   Alert, oriented and in no acute distress     Inflamed seborrheic keratosis on neck and back    Stuck on papules and brown macules on trunk and ext   Red papules on trunk  Flesh colored papules on trunk         Eyes: Conjunctivae/lids:Normal     ENT: Lips, mucosa: normal    MSK:Normal    Cardiovascular: peripheral edema none    Pulm: Breathing Normal    Neuro/Psych: Orientation:Alert and Orientedx3 ; Mood/Affect:normal       A/P:  1. Seborrheic keratosis, lentigo, angioma, dermal nevus  2. Inflamed seborrheic keratosis   Back x10  Neck x4  LN2:  Treated with LN2 for 5s for 1-2 cycles. Warned risks of blistering, pain, pigment change, scarring, and incomplete resolution.  Advised patient to return if lesions do not completely resolve.  Wound care sheet given.  It was a pleasure speaking to Marlene Millan today.  Previous clinic notes and pertinent laboratory tests were reviewed prior to Marlene Millan's visit.  Patient encouraged to perform monthly skin exams.  UV precautions reviewed with patient.  Return to clinic next appt      Again, thank you for allowing me to participate in the care of your patient.         Sincerely,        Errol Anderson MD    Electronically signed

## 2025-02-18 NOTE — PROGRESS NOTES
Marlene Millan is an extremely pleasant 84 year old year old female patient here today for itching spots on neck and back.  Patient has no other skin complaints today.  Remainder of the HPI, Meds, PMH, Allergies, FH, and SH was reviewed in chart.      Past Medical History:   Diagnosis Date    Basal cell carcinoma     Hypertension 06/07/2010    Macular degeneration     PONV (postoperative nausea and vomiting)     Pulmonary embolism (H)     Pulmonary embolism, bilateral (H) 02/19/2012    Post-surgical at [facility] following total knee replacement     Shingles outbreak 12/2016    Squamous cell carcinoma of skin, unspecified        Past Surgical History:   Procedure Laterality Date    basal cell cancer of nose  Oct 2012    BREAST SURGERY      breast reduction    CARPAL TUNNEL RELEASE RT/LT      right    COLONOSCOPY N/A 1/10/2023    Procedure: COLONOSCOPY with cold polypectomies;  Surgeon: Sachin Tan DO;  Location: [facility] OR    COMBINED REPAIR PTOSIS WITH BLEPHAROPLASTY BILATERAL Bilateral 6/30/2015    Procedure: COMBINED REPAIR PTOSIS WITH BLEPHAROPLASTY BILATERAL;  Surgeon: Ilir Marvin MD;  Location: [facility]    ESOPHAGOSCOPY, GASTROSCOPY, DUODENOSCOPY (EGD), COMBINED N/A 1/10/2023    Procedure: ESOPHAGOGASTRODUODENOSCOPY WITH biopsies and 45 Polish Savary Dilation;  Surgeon: Sachin Tan DO;  Location: [facility] OR    HC REMOVAL GALLBLADDER      LIGATE VEIN VARICOSE, PHLEBECTOMY MULTIPLE STAB, COMBINED Bilateral 8/13/2015    Procedure: COMBINED LIGATE VEIN VARICOSE, PHLEBECTOMY MULTIPLE STAB;  Surgeon: Alphonse Pro MD;  Location: [facility] OR    ORTHOPEDIC SURGERY      bilateral knee    PHACOEMULSIFICATION WITH STANDARD INTRAOCULAR LENS IMPLANT Right 12/21/2017    Procedure: PHACOEMULSIFICATION WITH STANDARD INTRAOCULAR LENS IMPLANT;  Right Cataract Removal with Implant;  Surgeon: Clif Michel MD;  Location: [facility] OR    SURGICAL HISTORY OF -       breast reduction  mammoplasty     SURGICAL HISTORY OF -       bilateral lower extremity vein stripping    SURGICAL HISTORY OF -   2010    left total knee arthroplasty        Family History   Problem Relation Age of Onset    Diabetes Father         last both legs to the disease    Heart Disease Father          age 91       Social History     Socioeconomic History    Marital status:      Spouse name: Not on file    Number of children: Not on file    Years of education: Not on file    Highest education level: Not on file   Occupational History    Not on file   Tobacco Use    Smoking status: Former     Current packs/day: 0.00     Types: Cigarettes     Start date: 1962     Quit date: 1982     Years since quittin.0    Smokeless tobacco: Never   Vaping Use    Vaping status: Never Used   Substance and Sexual Activity    Alcohol use: Yes     Comment: occ wine    Drug use: No    Sexual activity: Not Currently   Other Topics Concern    Parent/sibling w/ CABG, MI or angioplasty before 65F 55M? No   Social History Narrative    Not on file     Social Drivers of Health     Financial Resource Strain: Low Risk  (3/13/2024)    Financial Resource Strain     Within the past 12 months, have you or your family members you live with been unable to get utilities (heat, electricity) when it was really needed?: No   Food Insecurity: Low Risk  (3/13/2024)    Food Insecurity     Within the past 12 months, did you worry that your food would run out before you got money to buy more?: No     Within the past 12 months, did the food you bought just not last and you didn t have money to get more?: No   Transportation Needs: Low Risk  (3/13/2024)    Transportation Needs     Within the past 12 months, has lack of transportation kept you from medical appointments, getting your medicines, non-medical meetings or appointments, work, or from getting things that you need?: No   Physical Activity: Sufficiently Active (3/13/2024)     Exercise Vital Sign     Days of Exercise per Week: 7 days     Minutes of Exercise per Session: 30 min   Stress: No Stress Concern Present (3/13/2024)    [employer] [place] [employer] - Occupational Stress Questionnaire     Feeling of Stress : Not at all   Social Connections: Unknown (3/13/2024)    Social Connection and Isolation Panel [NHANES]     Frequency of Communication with Friends and Family: Not on file     Frequency of Social Gatherings with Friends and Family: Twice a week     Attends Mormon Services: Not on file     Active Member of Clubs or Organizations: Not on file     Attends Club or Organization Meetings: Not on file     Marital Status: Not on file   Interpersonal Safety: Low Risk  (10/17/2024)    Interpersonal Safety     Do you feel physically and emotionally safe where you currently live?: Yes     Within the past 12 months, have you been hit, slapped, kicked or otherwise physically hurt by someone?: No     Within the past 12 months, have you been humiliated or emotionally abused in other ways by your partner or ex-partner?: No   Housing Stability: Low Risk  (3/13/2024)    Housing Stability     Do you have housing? : Yes     Are you worried about losing your housing?: No       Outpatient Encounter Medications as of 2/18/2025   Medication Sig Dispense Refill    amLODIPine (NORVASC) 10 MG tablet Take 1 tablet (10 mg) by mouth daily. 90 tablet 4    atorvastatin (LIPITOR) 40 MG tablet Take 1 tablet (40 mg) by mouth daily 90 tablet 3    Cholecalciferol (VITAMIN D3) 25 MCG (1000 UT) CAPS Take 1 capsule by mouth daily      diclofenac (VOLTAREN) 1 % topical gel Apply 2 g topically 4 times daily 50 g 0    minoxidil (LONITEN) 2.5 MG tablet 1/4 tabelt daily 60 tablet 3    Multiple Vitamins-Minerals (HAIR SKIN AND NAILS FORMULA PO) Take 2 chew tab by mouth      Multiple Vitamins-Minerals (MACULAR VITAMIN BENEFIT PO) Take 1 tablet by mouth daily Focus MaculaPro      ondansetron (ZOFRAN) 8 MG tablet  Take 0.5-1 tablets (4-8 mg) by mouth every 8 hours as needed for nausea. 20 tablet 1    tiZANidine (ZANAFLEX) 2 MG tablet TAKE 1 TABLET (2 MG) BY MOUTH NIGHTLY AS NEEDED FOR MUSCLE SPASMS 10 tablet 0    valACYclovir (VALTREX) 1000 mg tablet 2 tabs at onset of cold sores. Repeat dose after 12 hours. 12 tablet 4     No facility-administered encounter medications on file as of 2/18/2025.             O:   NAD, WDWN, Alert & Oriented, Mood & Affect wnl, Vitals stable   General appearance normal   Vitals stable   Alert, oriented and in no acute distress     Inflamed seborrheic keratosis on neck and back    Stuck on papules and brown macules on trunk and ext   Red papules on trunk  Flesh colored papules on trunk         Eyes: Conjunctivae/lids:Normal     ENT: Lips, mucosa: normal    MSK:Normal    Cardiovascular: peripheral edema none    Pulm: Breathing Normal    Neuro/Psych: Orientation:Alert and Orientedx3 ; Mood/Affect:normal       A/P:  1. Seborrheic keratosis, lentigo, angioma, dermal nevus  2. Inflamed seborrheic keratosis   Back x10  Neck x4  LN2:  Treated with LN2 for 5s for 1-2 cycles. Warned risks of blistering, pain, pigment change, scarring, and incomplete resolution.  Advised patient to return if lesions do not completely resolve.  Wound care sheet given.  It was a pleasure speaking to Marlene Millan today.  Previous clinic notes and pertinent laboratory tests were reviewed prior to Marlene Millan's visit.  Patient encouraged to perform monthly skin exams.  UV precautions reviewed with patient.  Return to clinic next appt

## 2025-02-18 NOTE — PATIENT INSTRUCTIONS
WOUND CARE INSTRUCTIONS   FOR CRYOSURGERY   This area treated with liquid nitrogen should form a blister (areas treated may or may not blister-skin may just turn dark and slough off). You do not need to bandage the area unless a blister forms and breaks (which may be a few days). When the blister breaks, begin daily dressing changes as follows:  1) Clean and dry the area with tap water using clean Q-tip or sterile gauze pad.   2) Apply Polysporin ointment or Bacitracin ointment over entire wound. Do NOT use Neosporin ointment.   3) Cover the wound with a band-aid or sterile non-stick gauze pad and micropore paper tape.   REPEAT THESE INSTRUCTIONS AT LEAST ONCE A DAY UNTIL THE WOUND HAS COMPLETELY HEALED.   It is an old wives tale that a wound heals better when it is exposed to air and allowed to dry out. The wound will heal faster with a better cosmetic result if it is kept moist with ointment and covered with a bandage.   Do not let the wound dry out.   IMPORTANT INFORMATION ON REVERSE SIDE   Supplies Needed:   *Cotton tipped applicators (Q-tips)   *Polysporin ointment or Bacitracin ointment (NOT NEOSPORIN)   *Band-aids, or non stick gauze pads and micropore paper tape   PATIENT INFORMATION   During the healing process you will notice a number of changes. All wounds develop a small halo of redness surrounding the wound. This means healing is occurring. Severe itching with extensive redness usually indicates sensitivity to the ointment or bandage tape used to dress the wound. You should call our office if this develops.   Swelling and/or discoloration around your surgical site is common, particularly when performed around the eye.   All wounds normally drain. The larger the wound the more drainage there will be. After 7-10 days, you will notice the wound beginning to shrink and new skin will begin to grow. The wound is healed when you can see skin has formed over the entire area. A healed wound has a healthy,  shiny look to the surface and is red to dark pink in color to normalize. Wounds may take approximately 4-6 weeks to heal. Larger wounds may take 6-8 weeks. After the wound is healed you may discontinue dressing changes.   You may experience a sensation of tightness as your wound heals. This is normal and will gradually subside.   Your healed wound may be sensitive to temperature changes. This sensitivity improves with time, but if you re having a lot of discomfort, try to avoid temperature extremes.   Patients frequently experience itching after their wound appears to have healed because of the continue healing under the skin. Plain Vaseline will help relieve the itching.                Proper skin care from [facility]:    -Eliminate harsh soaps as they strip the natural oils from the skin, often resulting in dry itchy skin ( i.e. Dial, Zest, Lesley Spring)  -Use mild soaps such as Cetaphil or Dove Sensitive Skin in the shower. You do not need to use soap on arms, legs, and trunk every time you shower unless visibly soiled.   -Avoid hot or cold showers.  -After showering, lightly dry off and apply moisturizing within 2-3 minutes. This will help trap moisture in the skin.   -Aggressive use of a moisturizer at least 1-2 times a day to the entire body (including -Vanicream, Cetaphil, Aquaphor or Cerave) and moisturize hands after every washing.  -We recommend using moisturizers that come in a tub that needs to be scooped out, not a pump. This has more of an oil base. It will hold moisture in your skin much better than a water base moisturizer. The above recommended are non-pore clogging.      Wear a sunscreen with at least SPF 30 on your face, ears, neck and V of the chest daily. Wear sunscreen on other areas of the body if those areas are exposed to the sun throughout the day. Sunscreens can contain physical and/or chemical blockers. Physical blockers are less likely to clog pores, these include zinc oxide  and titanium dioxide. Reapply every two hour and after swimming.     Sunscreen examples: https://www.ewg.org/sunscreen/    UV radiation  UVA radiation remains constant throughout the day and throughout the year. It is a longer wavelength than UVB and therefore penetrates deeper into the skin leading to immediate and delayed tanning, photoaging, and skin cancer. 70-80% of UVA and UVB radiation occurs between the hours of 10am-2pm.  UVB radiation  UVB radiation causes the most harmful effects and is more significant during the summer months. However, snow and ice can reflect UVB radiation leading to skin damage during the winter months as well. UVB radiation is responsible for tanning, burning, inflammation, delayed erythema (pinkness), pigmentation (brown spots), and skin cancer.     I recommend self monthly full body exams and yearly full body exams with a dermatology provider. If you develop a new or changing lesion please follow up for examination. Most skin cancers are pink and scaly or pink and pearly. However, we do see blue/brown/black skin cancers.  Consider the ABCDEs of melanoma when giving yourself your monthly full body exam ( don't forget the groin, buttocks, feet, toes, etc). A-asymmetry, B-borders, C-color, D-diameter, E-elevation or evolving. If you see any of these changes please follow up in clinic. If you cannot see your back I recommend purchasing a hand held mirror to use with a larger wall mirror.       Checking for Skin Cancer  You can find cancer early by checking your skin each month. There are 3 kinds of skin cancer. They are melanoma, basal cell carcinoma, and squamous cell carcinoma. Doing monthly skin checks is the best way to find new marks or skin changes. Follow the instructions below for checking your skin.   The ABCDEs of checking moles for melanoma   Check your moles or growths for signs of melanoma using ABCDE:   Asymmetry: the sides of the mole or growth don t match  Border: the  edges are ragged, notched, or blurred  Color: the color within the mole or growth varies  Diameter: the mole or growth is larger than 6 mm (size of a pencil eraser)  Evolving: the size, shape, or color of the mole or growth is changing (evolving is not shown in the images below)    Checking for other types of skin cancer  Basal cell carcinoma or squamous cell carcinoma have symptoms such as:     A spot or mole that looks different from all other marks on your skin  Changes in how an area feels, such as itching, tenderness, or pain  Changes in the skin's surface, such as oozing, bleeding, or scaliness  A sore that does not heal  New swelling or redness beyond the border of a mole    Who s at risk?  Anyone can get skin cancer. But you are at greater risk if you have:   Fair skin, light-colored hair, or light-colored eyes  Many moles or abnormal moles on your skin  A history of sunburns from sunlight or tanning beds  A family history of skin cancer  A history of exposure to radiation or chemicals  A weakened immune system  If you have had skin cancer in the past, you are at risk for recurring skin cancer.   How to check your skin  Do your monthly skin checkups in front of a full-length mirror. Check all parts of your body, including your:   Head (ears, face, neck, and scalp)  Torso (front, back, and sides)  Arms (tops, undersides, upper, and lower armpits)  Hands (palms, backs, and fingers, including under the nails)  Buttocks and genitals  Legs (front, back, and sides)  Feet (tops, soles, toes, including under the nails, and between toes)  If you have a lot of moles, take digital photos of them each month. Make sure to take photos both up close and from a distance. These can help you see if any moles change over time.   Most skin changes are not cancer. But if you see any changes in your skin, call your doctor right away. Only he or she can diagnose a problem. If you have skin cancer, seeing your doctor can be the  first step toward getting the treatment that could save your life.   [facility] last reviewed this educational content on 4/1/2019 2000-2020 [employer]. [address] [place] [address], [place] [address]. All rights reserved. This information is not intended as a substitute for professional medical care. Always follow your healthcare professional's instructions.       When should I call my doctor?  If you are worsening or not improving, please, contact us or seek urgent care as noted below.     Who should I call with questions (adults)?    [facility] [phone]  For urgent needs outside of business hours call the [facility] at [phone] and ask for the dermatology resident on call to be paged  If this is a medical emergency and you are unable to reach an ER, Call [phone]      If you need a prescription refill, please contact your pharmacy. Refills are approved or denied by our Physicians during normal business hours, Monday through Friday.  Per office policy, refills will not be granted if you have not been seen within the past year (or sooner depending on the condition).

## 2025-02-25 ENCOUNTER — PATIENT OUTREACH (OUTPATIENT)
Dept: CARE COORDINATION | Facility: CLINIC | Age: 85
End: 2025-02-25
Payer: COMMERCIAL

## 2025-03-18 ENCOUNTER — TELEPHONE (OUTPATIENT)
Dept: FAMILY MEDICINE | Facility: CLINIC | Age: 85
End: 2025-03-18
Payer: COMMERCIAL

## 2025-03-18 DIAGNOSIS — R13.19 ESOPHAGEAL DYSPHAGIA: Primary | ICD-10-CM

## 2025-03-18 NOTE — TELEPHONE ENCOUNTER
Order/Referral Request    Who is requesting: PCP    Orders being requested: REFERRAL TO [facility] FOR ENDOSCOPY    Reason service is needed/diagnosis: ESOPHAGUS NEEDING TO BE STRETCHED    When are orders needed by: ASAP    Has this been discussed with Provider: Yes    Does patient have a preference on a Group/Provider/Facility? [facility] TO DR. KAMLA ARZOLA    Does patient have an appointment scheduled?: No    Where to send orders: Fax [phone]    Could we send this information to you in [facility] or would you prefer to receive a phone call?:   Patient would prefer a phone call   Okay to leave a detailed message?: Yes at Cell number on file:    Telephone Information:   Mobile [phone]

## 2025-03-19 NOTE — TELEPHONE ENCOUNTER
Referral printed and faxed.    Informed patient via detailed message on patient's personal voicemail where first and last name were given in the greeting.    Lorraine ALBRECHT LPN

## 2025-04-16 ENCOUNTER — NURSE TRIAGE (OUTPATIENT)
Dept: FAMILY MEDICINE | Facility: CLINIC | Age: 85
End: 2025-04-16
Payer: COMMERCIAL

## 2025-04-16 NOTE — TELEPHONE ENCOUNTER
Symptoms    Describe your symptoms: Right breast pain-     Any pain: Yes: off and on pain    How long have you been having symptoms: 2  months    Have you been seen for this:  No    Appointment offered?: Yes: wanting to be seen this week- nothing currently available     Triage offered?: Yes- declined    Home remedies tried: No    Preferred Pharmacy:     [place], [place] - [address]  [address] [facility] ([place] [address]  Phone: [phone] Fax: [phone] Alternate Fax: [phone]        Could we send this information to you in [employer] or would you prefer to receive a phone call?:   Patient would prefer a phone call   Okay to leave a detailed message?: Yes at Cell number on file:    Telephone Information:   Mobile [phone]

## 2025-04-17 ENCOUNTER — OFFICE VISIT (OUTPATIENT)
Dept: FAMILY MEDICINE | Facility: CLINIC | Age: 85
End: 2025-04-17
Payer: COMMERCIAL

## 2025-04-17 VITALS
HEIGHT: 63 IN | BODY MASS INDEX: 28 KG/M2 | TEMPERATURE: 97.6 F | OXYGEN SATURATION: 98 % | HEART RATE: 74 BPM | RESPIRATION RATE: 16 BRPM | DIASTOLIC BLOOD PRESSURE: 64 MMHG | SYSTOLIC BLOOD PRESSURE: 118 MMHG | WEIGHT: 158 LBS

## 2025-04-17 DIAGNOSIS — N64.4 MASTALGIA: ICD-10-CM

## 2025-04-17 DIAGNOSIS — R07.9 CHEST PAIN, UNSPECIFIED TYPE: Primary | ICD-10-CM

## 2025-04-17 PROCEDURE — 3074F SYST BP LT 130 MM HG: CPT | Performed by: FAMILY MEDICINE

## 2025-04-17 PROCEDURE — 99214 OFFICE O/P EST MOD 30 MIN: CPT | Performed by: FAMILY MEDICINE

## 2025-04-17 PROCEDURE — 3078F DIAST BP <80 MM HG: CPT | Performed by: FAMILY MEDICINE

## 2025-04-17 PROCEDURE — 93000 ELECTROCARDIOGRAM COMPLETE: CPT | Performed by: FAMILY MEDICINE

## 2025-04-17 PROCEDURE — 1126F AMNT PAIN NOTED NONE PRSNT: CPT | Performed by: FAMILY MEDICINE

## 2025-04-17 ASSESSMENT — PAIN SCALES - GENERAL: PAINLEVEL_OUTOF10: NO PAIN (0)

## 2025-04-17 NOTE — TELEPHONE ENCOUNTER
Contacted patient   States she has had right sided pain under her breast for the past six weeks  Patient is very sad, her dog  on Friday, tearful on phone  Reassured her she did the best she could  States he ate something he dug up and was ill and did not recover despite medication  Patient scheduled for appointment today  Has upper endoscopy scheduled tomorrow. Will discuss at appointment today.     Sunitha Fuentes RN on 2025 at 9:31 AM

## 2025-04-17 NOTE — NURSING NOTE
"Initial There were no vitals taken for this visit. Estimated body mass index is 28.7 kg/m  as calculated from the following:    Height as of 10/17/24: 1.6 m (5' 3\").    Weight as of 10/17/24: 73.5 kg (162 lb). .    "

## 2025-04-17 NOTE — PROGRESS NOTES
{PROVIDER CHARTING PREFERENCE:203559}    Subjective   Janee is a 84 year old, presenting for the following health issues:  Breast Pain        4/17/2025     1:26 PM   Additional Questions   Roomed by Giselle REESE CMA     History of Present Illness       Reason for visit:  Pain in my right breast  Symptom onset:  More than a month  Symptoms include:  Off and on pain  Symptom intensity:  Mild  Symptom progression:  Staying the same  Had these symptoms before:  No  What makes it worse:  No  What makes it better:  No She is missing 2 dose(s) of medications per week.        {MA/LPN/RN Pre-Provider Visit Orders- hCG/UA/Strep (Optional):769244}  {SUPERLIST (Optional):397428}  {additonal problems for provider to add (Optional):998393}    {ROS Picklists (Optional):452234}      Objective    There were no vitals taken for this visit.  There is no height or weight on file to calculate BMI.  Physical Exam   {Exam List (Optional):583354}    {Diagnostic Test Results (Optional):030096}        Signed Electronically by: Ktay Rodriguez MD  {Email feedback regarding this note to [email or web address]   :495074}

## 2025-04-17 NOTE — NURSING NOTE
"Initial /64   Pulse 74   Temp 97.6  F (36.4  C) (Tympanic)   Resp 16   Ht 1.6 m (5' 3\")   Wt 71.7 kg (158 lb)   SpO2 98%   BMI 27.99 kg/m   Estimated body mass index is 27.99 kg/m  as calculated from the following:    Height as of this encounter: 1.6 m (5' 3\").    Weight as of this encounter: 71.7 kg (158 lb). .    "

## 2025-04-17 NOTE — TELEPHONE ENCOUNTER
"Reason for Disposition    [1] Breast pain AND [2] cause is not known    Answer Assessment - Initial Assessment Questions  1. SYMPTOM: \"What's the main symptom you're concerned about?\"  (e.g., lump, pain, rash, nipple discharge)      Right sided pain, below right breast  2. LOCATION: \"Where is the pain located?\"      Right breast  3. ONSET: \"When did pain  start?\"      6 weeks ago  4. PRIOR HISTORY: \"Do you have any history of prior problems with your breasts?\" (e.g., lumps, cancer, fibrocystic breast disease)      denies  5. CAUSE: \"What do you think is causing this symptom?\"      unsure  6. OTHER SYMPTOMS: \"Do you have any other symptoms?\" (e.g., fever, breast pain, redness or rash, nipple discharge)      pain  7. PREGNANCY-BREASTFEEDING: \"Is there any chance you are pregnant?\" \"When was your last menstrual period?\" \"Are you breastfeeding?\"      na    Protocols used: Breast Symptoms-A-AH    "

## 2025-05-06 ENCOUNTER — OFFICE VISIT (OUTPATIENT)
Dept: DERMATOLOGY | Facility: CLINIC | Age: 85
End: 2025-05-06
Payer: COMMERCIAL

## 2025-05-06 DIAGNOSIS — L82.0 INFLAMED SEBORRHEIC KERATOSIS: Primary | ICD-10-CM

## 2025-05-06 PROCEDURE — 1126F AMNT PAIN NOTED NONE PRSNT: CPT | Performed by: DERMATOLOGY

## 2025-05-06 PROCEDURE — 17111 DESTRUCTION B9 LESIONS 15/>: CPT | Performed by: DERMATOLOGY

## 2025-05-06 ASSESSMENT — PAIN SCALES - GENERAL: PAINLEVEL_OUTOF10: NO PAIN (0)

## 2025-05-06 NOTE — NURSING NOTE
Marlene Millan's chief complaint for this visit includes:  Chief Complaint   Patient presents with    Derm Problem     Patient would like inflamed spots froze on her back.      PCP: Katy Rodriguez    Referring Provider:  Errol Anderson MD  [address] [place] [address]    There were no vitals taken for this visit.  No Pain (0)        Allergies   Allergen Reactions    Erythromycin Itching    Sulfa Antibiotics Visual Disturbance     Affected eyesight         Do you need any medication refills at today's visit? No    Reina Alvarado MA

## 2025-05-06 NOTE — LETTER
5/6/2025      Marlene Millan  [address] [address] [facility] [address]      Dear Colleague,    Thank you for referring your patient, Marlene Millan, to the [facility]. Please see a copy of my visit note below.    Marlene Millan is an extremely pleasant 84 year old year old female patient here today for itching spots on neck and trunk.  Patient has no other skin complaints today.  Remainder of the HPI, Meds, PMH, Allergies, FH, and SH was reviewed in chart.      Past Medical History:   Diagnosis Date     Basal cell carcinoma      Hypertension 06/07/2010     Macular degeneration      PONV (postoperative nausea and vomiting)      Pulmonary embolism (H)      Pulmonary embolism, bilateral (H) 02/19/2012    Post-surgical at [facility] following total knee replacement      Shingles outbreak 12/2016     Squamous cell carcinoma of skin, unspecified        Past Surgical History:   Procedure Laterality Date     basal cell cancer of nose  Oct 2012     BREAST SURGERY      breast reduction     CARPAL TUNNEL RELEASE RT/LT      right     COLONOSCOPY N/A 1/10/2023    Procedure: COLONOSCOPY with cold polypectomies;  Surgeon: Sachin Tan DO;  Location: [facility] OR     COMBINED REPAIR PTOSIS WITH BLEPHAROPLASTY BILATERAL Bilateral 6/30/2015    Procedure: COMBINED REPAIR PTOSIS WITH BLEPHAROPLASTY BILATERAL;  Surgeon: Ilir Marvin MD;  Location: [facility]     ESOPHAGOSCOPY, GASTROSCOPY, DUODENOSCOPY (EGD), COMBINED N/A 1/10/2023    Procedure: ESOPHAGOGASTRODUODENOSCOPY WITH biopsies and 45 [place] Savary Dilation;  Surgeon: Sachin Tan DO;  Location: [facility] OR     HC REMOVAL GALLBLADDER       LIGATE VEIN VARICOSE, PHLEBECTOMY MULTIPLE STAB, COMBINED Bilateral 8/13/2015    Procedure: COMBINED LIGATE VEIN VARICOSE, PHLEBECTOMY MULTIPLE STAB;  Surgeon: Alphonse Pro MD;  Location: [facility] OR     ORTHOPEDIC SURGERY      bilateral knee     PHACOEMULSIFICATION WITH STANDARD  INTRAOCULAR LENS IMPLANT Right 2017    Procedure: PHACOEMULSIFICATION WITH STANDARD INTRAOCULAR LENS IMPLANT;  Right Cataract Removal with Implant;  Surgeon: Clif Michel MD;  Location: [facility] OR     SURGICAL HISTORY OF -       breast reduction mammoplasty      SURGICAL HISTORY OF -       bilateral lower extremity vein stripping     SURGICAL HISTORY OF -   2010    left total knee arthroplasty        Family History   Problem Relation Age of Onset     Diabetes Father         last both legs to the disease     Heart Disease Father          age 91       Social History     Socioeconomic History     Marital status:      Spouse name: Not on file     Number of children: Not on file     Years of education: Not on file     Highest education level: Not on file   Occupational History     Not on file   Tobacco Use     Smoking status: Former     Current packs/day: 0.00     Types: Cigarettes     Start date: 1962     Quit date: 1982     Years since quittin.2     Smokeless tobacco: Never   Vaping Use     Vaping status: Never Used   Substance and Sexual Activity     Alcohol use: Yes     Comment: occ wine     Drug use: No     Sexual activity: Not Currently   Other Topics Concern     Parent/sibling w/ CABG, MI or angioplasty before 65F 55M? No   Social History Narrative     Not on file     Social Drivers of Health     Financial Resource Strain: Low Risk  (3/13/2024)    Financial Resource Strain      Within the past 12 months, have you or your family members you live with been unable to get utilities (heat, electricity) when it was really needed?: No   Food Insecurity: Low Risk  (3/13/2024)    Food Insecurity      Within the past 12 months, did you worry that your food would run out before you got money to buy more?: No      Within the past 12 months, did the food you bought just not last and you didn t have money to get more?: No   Transportation Needs: Low Risk  (3/13/2024)    Transportation  Needs      Within the past 12 months, has lack of transportation kept you from medical appointments, getting your medicines, non-medical meetings or appointments, work, or from getting things that you need?: No   Physical Activity: Sufficiently Active (3/13/2024)    Exercise Vital Sign      Days of Exercise per Week: 7 days      Minutes of Exercise per Session: 30 min   Stress: No Stress Concern Present (3/13/2024)    [place] [employer] [place] [employer] - Occupational Stress Questionnaire      Feeling of Stress : Not at all   Social Connections: Unknown (3/13/2024)    Social Connection and Isolation Panel [NHANES]      Frequency of Communication with Friends and Family: Not on file      Frequency of Social Gatherings with Friends and Family: Twice a week      Attends Sabianism Services: Not on file      Active Member of Clubs or Organizations: Not on file      Attends Club or Organization Meetings: Not on file      Marital Status: Not on file   Interpersonal Safety: Low Risk  (4/17/2025)    Interpersonal Safety      Do you feel physically and emotionally safe where you currently live?: Yes      Within the past 12 months, have you been hit, slapped, kicked or otherwise physically hurt by someone?: No      Within the past 12 months, have you been humiliated or emotionally abused in other ways by your partner or ex-partner?: No   Housing Stability: Low Risk  (3/13/2024)    Housing Stability      Do you have housing? : Yes      Are you worried about losing your housing?: No       Outpatient Encounter Medications as of 5/6/2025   Medication Sig Dispense Refill     amLODIPine (NORVASC) 10 MG tablet Take 1 tablet (10 mg) by mouth daily. 90 tablet 4     atorvastatin (LIPITOR) 40 MG tablet Take 1 tablet (40 mg) by mouth daily 90 tablet 3     Cholecalciferol (VITAMIN D3) 25 MCG (1000 UT) CAPS Take 1 capsule by mouth daily       diclofenac (VOLTAREN) 1 % topical gel Apply 2 g topically 4 times daily 50 g 0     minoxidil  (LONITEN) 2.5 MG tablet 1/4 tabelt daily 60 tablet 3     Multiple Vitamins-Minerals (HAIR SKIN AND NAILS FORMULA PO) Take 2 chew tab by mouth       Multiple Vitamins-Minerals (MACULAR VITAMIN BENEFIT PO) Take 1 tablet by mouth daily Focus MaculaPro       ondansetron (ZOFRAN) 8 MG tablet Take 0.5-1 tablets (4-8 mg) by mouth every 8 hours as needed for nausea. 20 tablet 1     tiZANidine (ZANAFLEX) 2 MG tablet TAKE 1 TABLET (2 MG) BY MOUTH NIGHTLY AS NEEDED FOR MUSCLE SPASMS 10 tablet 0     valACYclovir (VALTREX) 1000 mg tablet 2 tabs at onset of cold sores. Repeat dose after 12 hours. 12 tablet 4     No facility-administered encounter medications on file as of 5/6/2025.             O:   NAD, WDWN, Alert & Oriented, Mood & Affect wnl, Vitals stable   General appearance normal   Vitals stable   Alert, oriented and in no acute distress     Inflamed seborrheic keratosis on trunk and neck       Eyes: Conjunctivae/lids:Normal     ENT: Lips, mucosa: normal    MSK:Normal    Cardiovascular: peripheral edema none    Pulm: Breathing Normal    Neuro/Psych: Orientation:Alert and Orientedx3 ; Mood/Affect:normal       A/P:  Inflamed seborrheic keratosis 20   LN2:  Treated with LN2 for 5s for 1-2 cycles. Warned risks of blistering, pain, pigment change, scarring, and incomplete resolution.  Advised patient to return if lesions do not completely resolve.  Wound care sheet given.  It was a pleasure speaking to Marlene Millan today.  Previous clinic notes and pertinent laboratory tests were reviewed prior to Marlene Millan's visit.  Patient encouraged to perform monthly skin exams.  UV precautions reviewed with patient.      Again, thank you for allowing me to participate in the care of your patient.        Sincerely,        Errol Anderson MD    Electronically signed

## 2025-05-06 NOTE — PATIENT INSTRUCTIONS
WOUND CARE INSTRUCTIONS   FOR CRYOSURGERY - instructions provided in exam room.  This area treated with liquid nitrogen should form a blister (areas treated may or may not blister-skin may just turn dark and slough off). You do not need to bandage the area unless a blister forms and breaks (which may be a few days). When the blister breaks, begin daily dressing changes as follows:  1) Clean and dry the area with tap water using clean Q-tip or sterile gauze pad.   2) Apply Polysporin ointment or Bacitracin ointment over entire wound. Do NOT use Neosporin ointment.   3) Cover the wound with a band-aid or sterile non-stick gauze pad and micropore paper tape.   REPEAT THESE INSTRUCTIONS AT LEAST ONCE A DAY UNTIL THE WOUND HAS COMPLETELY HEALED.   It is an old wives tale that a wound heals better when it is exposed to air and allowed to dry out. The wound will heal faster with a better cosmetic result if it is kept moist with ointment and covered with a bandage.   Do not let the wound dry out.   IMPORTANT INFORMATION ON REVERSE SIDE   Supplies Needed:   *Cotton tipped applicators (Q-tips)   *Polysporin ointment or Bacitracin ointment (NOT NEOSPORIN)   *Band-aids, or non stick gauze pads and micropore paper tape   PATIENT INFORMATION   During the healing process you will notice a number of changes. All wounds develop a small halo of redness surrounding the wound. This means healing is occurring. Severe itching with extensive redness usually indicates sensitivity to the ointment or bandage tape used to dress the wound. You should call our office if this develops.   Swelling and/or discoloration around your surgical site is common, particularly when performed around the eye.   All wounds normally drain. The larger the wound the more drainage there will be. After 7-10 days, you will notice the wound beginning to shrink and new skin will begin to grow. The wound is healed when you can see skin has formed over the entire  area. A healed wound has a healthy, shiny look to the surface and is red to dark pink in color to normalize. Wounds may take approximately 4-6 weeks to heal. Larger wounds may take 6-8 weeks. After the wound is healed you may discontinue dressing changes.   You may experience a sensation of tightness as your wound heals. This is normal and will gradually subside.   Your healed wound may be sensitive to temperature changes. This sensitivity improves with time, but if you re having a lot of discomfort, try to avoid temperature extremes.   Patients frequently experience itching after their wound appears to have healed because of the continue healing under the skin. Plain Vaseline will help relieve the itching.          Proper skin care from [facility]:    -Eliminate harsh soaps as they strip the natural oils from the skin, often resulting in dry itchy skin ( i.e. Dial, Zest, [place] Islander Spring)  -Use mild soaps such as Cetaphil or Dove Sensitive Skin in the shower. You do not need to use soap on arms, legs, and trunk every time you shower unless visibly soiled.   -Avoid hot or cold showers.  -After showering, lightly dry off and apply moisturizing within 2-3 minutes. This will help trap moisture in the skin.   -Aggressive use of a moisturizer at least 1-2 times a day to the entire body (including -Vanicream, Cetaphil, Aquaphor or Cerave) and moisturize hands after every washing.  -We recommend using moisturizers that come in a tub that needs to be scooped out, not a pump. This has more of an oil base. It will hold moisture in your skin much better than a water base moisturizer. The above recommended are non-pore clogging.      Wear a sunscreen with at least SPF 30 on your face, ears, neck and V of the chest daily. Wear sunscreen on other areas of the body if those areas are exposed to the sun throughout the day. Sunscreens can contain physical and/or chemical blockers. Physical blockers are less likely to clog  pores, these include zinc oxide and titanium dioxide. Reapply every two hour and after swimming.     Sunscreen examples: https://www.ewg.org/sunscreen/    UV radiation  UVA radiation remains constant throughout the day and throughout the year. It is a longer wavelength than UVB and therefore penetrates deeper into the skin leading to immediate and delayed tanning, photoaging, and skin cancer. 70-80% of UVA and UVB radiation occurs between the hours of 10am-2pm.  UVB radiation  UVB radiation causes the most harmful effects and is more significant during the summer months. However, snow and ice can reflect UVB radiation leading to skin damage during the winter months as well. UVB radiation is responsible for tanning, burning, inflammation, delayed erythema (pinkness), pigmentation (brown spots), and skin cancer.     I recommend self monthly full body exams and yearly full body exams with a dermatology provider. If you develop a new or changing lesion please follow up for examination. Most skin cancers are pink and scaly or pink and pearly. However, we do see blue/brown/black skin cancers.  Consider the ABCDEs of melanoma when giving yourself your monthly full body exam ( don't forget the groin, buttocks, feet, toes, etc). A-asymmetry, B-borders, C-color, D-diameter, E-elevation or evolving. If you see any of these changes please follow up in clinic. If you cannot see your back I recommend purchasing a hand held mirror to use with a larger wall mirror.       Checking for Skin Cancer  You can find cancer early by checking your skin each month. There are 3 kinds of skin cancer. They are melanoma, basal cell carcinoma, and squamous cell carcinoma. Doing monthly skin checks is the best way to find new marks or skin changes. Follow the instructions below for checking your skin.   The ABCDEs of checking moles for melanoma   Check your moles or growths for signs of melanoma using ABCDE:   Asymmetry: the sides of the mole or  growth don t match  Border: the edges are ragged, notched, or blurred  Color: the color within the mole or growth varies  Diameter: the mole or growth is larger than 6 mm (size of a pencil eraser)  Evolving: the size, shape, or color of the mole or growth is changing (evolving is not shown in the images below)    Checking for other types of skin cancer  Basal cell carcinoma or squamous cell carcinoma have symptoms such as:     A spot or mole that looks different from all other marks on your skin  Changes in how an area feels, such as itching, tenderness, or pain  Changes in the skin's surface, such as oozing, bleeding, or scaliness  A sore that does not heal  New swelling or redness beyond the border of a mole    Who s at risk?  Anyone can get skin cancer. But you are at greater risk if you have:   Fair skin, light-colored hair, or light-colored eyes  Many moles or abnormal moles on your skin  A history of sunburns from sunlight or tanning beds  A family history of skin cancer  A history of exposure to radiation or chemicals  A weakened immune system  If you have had skin cancer in the past, you are at risk for recurring skin cancer.   How to check your skin  Do your monthly skin checkups in front of a full-length mirror. Check all parts of your body, including your:   Head (ears, face, neck, and scalp)  Torso (front, back, and sides)  Arms (tops, undersides, upper, and lower armpits)  Hands (palms, backs, and fingers, including under the nails)  Buttocks and genitals  Legs (front, back, and sides)  Feet (tops, soles, toes, including under the nails, and between toes)  If you have a lot of moles, take digital photos of them each month. Make sure to take photos both up close and from a distance. These can help you see if any moles change over time.   Most skin changes are not cancer. But if you see any changes in your skin, call your doctor right away. Only he or she can diagnose a problem. If you have skin cancer,  seeing your doctor can be the first step toward getting the treatment that could save your life.   [employer] last reviewed this educational content on 4/1/2019 2000-2020 [employer], [facility]. [address] [place] [address], [place] [address]. All rights reserved. This information is not intended as a substitute for professional medical care. Always follow your healthcare professional's instructions.       When should I call my doctor?  If you are worsening or not improving, please, contact us or seek urgent care as noted below.     Who should I call with questions (adults)?    [facility] [phone]  For urgent needs outside of business hours call the [facility] at [phone] and ask for the dermatology resident on call to be paged  If this is a medical emergency and you are unable to reach an ER, Call [phone]      If you need a prescription refill, please contact your pharmacy. Refills are approved or denied by our Physicians during normal business hours, Monday through Friday.  Per office policy, refills will not be granted if you have not been seen within the past year (or sooner depending on the condition).

## 2025-05-06 NOTE — PROGRESS NOTES
Marlene Millan is an extremely pleasant 84 year old year old female patient here today for itching spots on neck and trunk.  Patient has no other skin complaints today.  Remainder of the HPI, Meds, PMH, Allergies, FH, and SH was reviewed in chart.      Past Medical History:   Diagnosis Date    Basal cell carcinoma     Hypertension 06/07/2010    Macular degeneration     PONV (postoperative nausea and vomiting)     Pulmonary embolism (H)     Pulmonary embolism, bilateral (H) 02/19/2012    Post-surgical at [facility] following total knee replacement     Shingles outbreak 12/2016    Squamous cell carcinoma of skin, unspecified        Past Surgical History:   Procedure Laterality Date    basal cell cancer of nose  Oct 2012    BREAST SURGERY      breast reduction    CARPAL TUNNEL RELEASE RT/LT      right    COLONOSCOPY N/A 1/10/2023    Procedure: COLONOSCOPY with cold polypectomies;  Surgeon: Sachin Tan DO;  Location: [facility] OR    COMBINED REPAIR PTOSIS WITH BLEPHAROPLASTY BILATERAL Bilateral 6/30/2015    Procedure: COMBINED REPAIR PTOSIS WITH BLEPHAROPLASTY BILATERAL;  Surgeon: Ilir Marvin MD;  Location: [facility]    ESOPHAGOSCOPY, GASTROSCOPY, DUODENOSCOPY (EGD), COMBINED N/A 1/10/2023    Procedure: ESOPHAGOGASTRODUODENOSCOPY WITH biopsies and 45 [place] Savary Dilation;  Surgeon: Sachin Tan DO;  Location: [facility] OR    HC REMOVAL GALLBLADDER      LIGATE VEIN VARICOSE, PHLEBECTOMY MULTIPLE STAB, COMBINED Bilateral 8/13/2015    Procedure: COMBINED LIGATE VEIN VARICOSE, PHLEBECTOMY MULTIPLE STAB;  Surgeon: Alphonse Pro MD;  Location: [facility] OR    ORTHOPEDIC SURGERY      bilateral knee    PHACOEMULSIFICATION WITH STANDARD INTRAOCULAR LENS IMPLANT Right 12/21/2017    Procedure: PHACOEMULSIFICATION WITH STANDARD INTRAOCULAR LENS IMPLANT;  Right Cataract Removal with Implant;  Surgeon: Clif Michel MD;  Location: [facility] OR    SURGICAL HISTORY OF -       breast reduction  mammoplasty     SURGICAL HISTORY OF -       bilateral lower extremity vein stripping    SURGICAL HISTORY OF -   2010    left total knee arthroplasty        Family History   Problem Relation Age of Onset    Diabetes Father         last both legs to the disease    Heart Disease Father          age 91       Social History     Socioeconomic History    Marital status:      Spouse name: Not on file    Number of children: Not on file    Years of education: Not on file    Highest education level: Not on file   Occupational History    Not on file   Tobacco Use    Smoking status: Former     Current packs/day: 0.00     Types: Cigarettes     Start date: 1962     Quit date: 1982     Years since quittin.2    Smokeless tobacco: Never   Vaping Use    Vaping status: Never Used   Substance and Sexual Activity    Alcohol use: Yes     Comment: occ wine    Drug use: No    Sexual activity: Not Currently   Other Topics Concern    Parent/sibling w/ CABG, MI or angioplasty before 65F 55M? No   Social History Narrative    Not on file     Social Drivers of Health     Financial Resource Strain: Low Risk  (3/13/2024)    Financial Resource Strain     Within the past 12 months, have you or your family members you live with been unable to get utilities (heat, electricity) when it was really needed?: No   Food Insecurity: Low Risk  (3/13/2024)    Food Insecurity     Within the past 12 months, did you worry that your food would run out before you got money to buy more?: No     Within the past 12 months, did the food you bought just not last and you didn t have money to get more?: No   Transportation Needs: Low Risk  (3/13/2024)    Transportation Needs     Within the past 12 months, has lack of transportation kept you from medical appointments, getting your medicines, non-medical meetings or appointments, work, or from getting things that you need?: No   Physical Activity: Sufficiently Active (3/13/2024)     Exercise Vital Sign     Days of Exercise per Week: 7 days     Minutes of Exercise per Session: 30 min   Stress: No Stress Concern Present (3/13/2024)    [employer] [place] [employer] - Occupational Stress Questionnaire     Feeling of Stress : Not at all   Social Connections: Unknown (3/13/2024)    Social Connection and Isolation Panel [NHANES]     Frequency of Communication with Friends and Family: Not on file     Frequency of Social Gatherings with Friends and Family: Twice a week     Attends Islam Services: Not on file     Active Member of Clubs or Organizations: Not on file     Attends Club or Organization Meetings: Not on file     Marital Status: Not on file   Interpersonal Safety: Low Risk  (4/17/2025)    Interpersonal Safety     Do you feel physically and emotionally safe where you currently live?: Yes     Within the past 12 months, have you been hit, slapped, kicked or otherwise physically hurt by someone?: No     Within the past 12 months, have you been humiliated or emotionally abused in other ways by your partner or ex-partner?: No   Housing Stability: Low Risk  (3/13/2024)    Housing Stability     Do you have housing? : Yes     Are you worried about losing your housing?: No       Outpatient Encounter Medications as of 5/6/2025   Medication Sig Dispense Refill    amLODIPine (NORVASC) 10 MG tablet Take 1 tablet (10 mg) by mouth daily. 90 tablet 4    atorvastatin (LIPITOR) 40 MG tablet Take 1 tablet (40 mg) by mouth daily 90 tablet 3    Cholecalciferol (VITAMIN D3) 25 MCG (1000 UT) CAPS Take 1 capsule by mouth daily      diclofenac (VOLTAREN) 1 % topical gel Apply 2 g topically 4 times daily 50 g 0    minoxidil (LONITEN) 2.5 MG tablet 1/4 tabelt daily 60 tablet 3    Multiple Vitamins-Minerals (HAIR SKIN AND NAILS FORMULA PO) Take 2 chew tab by mouth      Multiple Vitamins-Minerals (MACULAR VITAMIN BENEFIT PO) Take 1 tablet by mouth daily Focus MaculaPro      ondansetron (ZOFRAN) 8 MG tablet  Take 0.5-1 tablets (4-8 mg) by mouth every 8 hours as needed for nausea. 20 tablet 1    tiZANidine (ZANAFLEX) 2 MG tablet TAKE 1 TABLET (2 MG) BY MOUTH NIGHTLY AS NEEDED FOR MUSCLE SPASMS 10 tablet 0    valACYclovir (VALTREX) 1000 mg tablet 2 tabs at onset of cold sores. Repeat dose after 12 hours. 12 tablet 4     No facility-administered encounter medications on file as of 5/6/2025.             O:   NAD, WDWN, Alert & Oriented, Mood & Affect wnl, Vitals stable   General appearance normal   Vitals stable   Alert, oriented and in no acute distress     Inflamed seborrheic keratosis on trunk and neck       Eyes: Conjunctivae/lids:Normal     ENT: Lips, mucosa: normal    MSK:Normal    Cardiovascular: peripheral edema none    Pulm: Breathing Normal    Neuro/Psych: Orientation:Alert and Orientedx3 ; Mood/Affect:normal       A/P:  Inflamed seborrheic keratosis 20   LN2:  Treated with LN2 for 5s for 1-2 cycles. Warned risks of blistering, pain, pigment change, scarring, and incomplete resolution.  Advised patient to return if lesions do not completely resolve.  Wound care sheet given.  It was a pleasure speaking to Marlene Millan today.  Previous clinic notes and pertinent laboratory tests were reviewed prior to Marlene Millan's visit.  Patient encouraged to perform monthly skin exams.  UV precautions reviewed with patient.

## 2025-05-08 ENCOUNTER — OFFICE VISIT (OUTPATIENT)
Dept: FAMILY MEDICINE | Facility: CLINIC | Age: 85
End: 2025-05-08
Attending: FAMILY MEDICINE
Payer: COMMERCIAL

## 2025-05-08 ENCOUNTER — HOSPITAL ENCOUNTER (OUTPATIENT)
Dept: MAMMOGRAPHY | Facility: CLINIC | Age: 85
Discharge: HOME | End: 2025-05-08
Attending: FAMILY MEDICINE
Payer: COMMERCIAL

## 2025-05-08 VITALS
HEIGHT: 63 IN | DIASTOLIC BLOOD PRESSURE: 80 MMHG | SYSTOLIC BLOOD PRESSURE: 132 MMHG | TEMPERATURE: 97.4 F | WEIGHT: 158 LBS | OXYGEN SATURATION: 99 % | RESPIRATION RATE: 16 BRPM | HEART RATE: 60 BPM | BODY MASS INDEX: 28 KG/M2

## 2025-05-08 DIAGNOSIS — G47.00 INSOMNIA, UNSPECIFIED TYPE: ICD-10-CM

## 2025-05-08 DIAGNOSIS — Z00.00 ENCOUNTER FOR MEDICARE ANNUAL WELLNESS EXAM: Primary | ICD-10-CM

## 2025-05-08 DIAGNOSIS — E78.5 HYPERLIPIDEMIA LDL GOAL <130: ICD-10-CM

## 2025-05-08 DIAGNOSIS — B00.9 HSV (HERPES SIMPLEX VIRUS) INFECTION: ICD-10-CM

## 2025-05-08 DIAGNOSIS — N64.4 MASTALGIA: ICD-10-CM

## 2025-05-08 DIAGNOSIS — I10 ESSENTIAL HYPERTENSION: ICD-10-CM

## 2025-05-08 PROCEDURE — 77062 BREAST TOMOSYNTHESIS BI: CPT

## 2025-05-08 PROCEDURE — 77066 DX MAMMO INCL CAD BI: CPT

## 2025-05-08 RX ORDER — ATORVASTATIN CALCIUM 40 MG/1
40 TABLET, FILM COATED ORAL DAILY
Qty: 90 TABLET | Refills: 4 | Status: SHIPPED | OUTPATIENT
Start: 2025-05-08

## 2025-05-08 RX ORDER — AMLODIPINE BESYLATE 10 MG/1
10 TABLET ORAL DAILY
Qty: 90 TABLET | Refills: 4 | Status: SHIPPED | OUTPATIENT
Start: 2025-05-08

## 2025-05-08 RX ORDER — TRAZODONE HYDROCHLORIDE 50 MG/1
50 TABLET ORAL AT BEDTIME
Qty: 30 TABLET | Refills: 1 | Status: SHIPPED | OUTPATIENT
Start: 2025-05-08

## 2025-05-08 RX ORDER — VALACYCLOVIR HYDROCHLORIDE 1 G/1
TABLET, FILM COATED ORAL
Qty: 12 TABLET | Refills: 4 | Status: SHIPPED | OUTPATIENT
Start: 2025-05-08

## 2025-05-08 SDOH — HEALTH STABILITY: PHYSICAL HEALTH: ON AVERAGE, HOW MANY DAYS PER WEEK DO YOU ENGAGE IN MODERATE TO STRENUOUS EXERCISE (LIKE A BRISK WALK)?: 3 DAYS

## 2025-05-08 SDOH — HEALTH STABILITY: PHYSICAL HEALTH: ON AVERAGE, HOW MANY MINUTES DO YOU ENGAGE IN EXERCISE AT THIS LEVEL?: 60 MIN

## 2025-05-08 ASSESSMENT — SOCIAL DETERMINANTS OF HEALTH (SDOH)
HOW OFTEN DO YOU GET TOGETHER WITH FRIENDS OR RELATIVES?: TWICE A WEEK
HOW OFTEN DO YOU GET TOGETHER WITH FRIENDS OR RELATIVES?: TWICE A WEEK

## 2025-05-08 ASSESSMENT — PAIN SCALES - GENERAL: PAINLEVEL_OUTOF10: NO PAIN (0)

## 2025-05-08 NOTE — PATIENT INSTRUCTIONS
Patient Education   Preventive Care Advice   This is general advice given by our system to help you stay healthy. However, your care team may have specific advice just for you. Please talk to your care team about your preventive care needs.  Nutrition  Eat 5 or more servings of fruits and vegetables each day.  Try wheat bread, brown rice and whole grain pasta (instead of white bread, rice, and pasta).  Get enough calcium and vitamin D. Check the label on foods and aim for 100% of the RDA (recommended daily allowance).  Lifestyle  Exercise at least 150 minutes each week  (30 minutes a day, 5 days a week).  Do muscle strengthening activities 2 days a week. These help control your weight and prevent disease.  No smoking.  Wear sunscreen to prevent skin cancer.  Have a dental exam and cleaning every 6 months.  Yearly exams  See your health care team every year to talk about:  Any changes in your health.  Any medicines your care team has prescribed.  Preventive care, family planning, and ways to prevent chronic diseases.  Shots (vaccines)   HPV shots (up to age 26), if you've never had them before.  Hepatitis B shots (up to age 59), if you've never had them before.  COVID-19 shot: Get this shot when it's due.  Flu shot: Get a flu shot every year.  Tetanus shot: Get a tetanus shot every 10 years.  Pneumococcal, hepatitis A, and RSV shots: Ask your care team if you need these based on your risk.  Shingles shot (for age 50 and up)  General health tests  Diabetes screening:  Starting at age 35, Get screened for diabetes at least every 3 years.  If you are younger than age 35, ask your care team if you should be screened for diabetes.  Cholesterol test: At age 39, start having a cholesterol test every 5 years, or more often if advised.  Bone density scan (DEXA): At age 50, ask your care team if you should have this scan for osteoporosis (brittle bones).  Hepatitis C: Get tested at least once in your life.  STIs (sexually  transmitted infections)  Before age 24: Ask your care team if you should be screened for STIs.  After age 24: Get screened for STIs if you're at risk. You are at risk for STIs (including HIV) if:  You are sexually active with more than one person.  You don't use condoms every time.  You or a partner was diagnosed with a sexually transmitted infection.  If you are at risk for HIV, ask about PrEP medicine to prevent HIV.  Get tested for HIV at least once in your life, whether you are at risk for HIV or not.  Cancer screening tests  Cervical cancer screening: If you have a cervix, begin getting regular cervical cancer screening tests starting at age 21.  Breast cancer scan (mammogram): If you've ever had breasts, begin having regular mammograms starting at age 40. This is a scan to check for breast cancer.  Colon cancer screening: It is important to start screening for colon cancer at age 45.  Have a colonoscopy test every 10 years (or more often if you're at risk) Or, ask your provider about stool tests like a FIT test every year or Cologuard test every 3 years.  To learn more about your testing options, visit:   .  For help making a decision, visit:   https://bit.ly/rc63858.  Prostate cancer screening test: If you have a prostate, ask your care team if a prostate cancer screening test (PSA) at age 55 is right for you.  Lung cancer screening: If you are a current or former smoker ages 50 to 80, ask your care team if ongoing lung cancer screenings are right for you.  For informational purposes only. Not to replace the advice of your health care provider. Copyright   2023 [facility]. All rights reserved. Clinically reviewed by the [facility] Transitions Program. Bilims 929361 - REV 01/24.  Learning About Stress  What is stress?     Stress is your body's response to a hard situation. Your body can have a physical, emotional, or mental response. Stress is a fact of life for most people, and it  affects everyone differently. What causes stress for you may not be stressful for someone else.  A lot of things can cause stress. You may feel stress when you go on a job interview, take a test, or run a race. This kind of short-term stress is normal and even useful. It can help you if you need to work hard or react quickly. For example, stress can help you finish an important job on time.  Long-term stress is caused by ongoing stressful situations or events. Examples of long-term stress include long-term health problems, ongoing problems at work, or conflicts in your family. Long-term stress can harm your health.  How does stress affect your health?  When you are stressed, your body responds as though you are in danger. It makes hormones that speed up your heart, make you breathe faster, and give you a burst of energy. This is called the fight-or-flight stress response. If the stress is over quickly, your body goes back to normal and no harm is done.  But if stress happens too often or lasts too long, it can have bad effects. Long-term stress can make you more likely to get sick, and it can make symptoms of some diseases worse. If you tense up when you are stressed, you may develop neck, shoulder, or low back pain. Stress is linked to high blood pressure and heart disease.  Stress also harms your emotional health. It can make you ni, tense, or depressed. Your relationships may suffer, and you may not do well at work or school.  What can you do to manage stress?  You can try these things to help manage stress:   Do something active. Exercise or activity can help reduce stress. Walking is a great way to get started. Even everyday activities such as housecleaning or yard work can help.  Try yoga or rocio chi. These techniques combine exercise and meditation. You may need some training at first to learn them.  Do something you enjoy. For example, listen to music or go to a movie. Practice your hobby or do volunteer  "work.  Meditate. This can help you relax, because you are not worrying about what happened before or what may happen in the future.  Do guided imagery. Imagine yourself in any setting that helps you feel calm. You can use online videos, books, or a teacher to guide you.  Do breathing exercises. For example:  From a standing position, bend forward from the waist with your knees slightly bent. Let your arms dangle close to the floor.  Breathe in slowly and deeply as you return to a standing position. Roll up slowly and lift your head last.  Hold your breath for just a few seconds in the standing position.  Breathe out slowly and bend forward from the waist.  Let your feelings out. Talk, laugh, cry, and express anger when you need to. Talking with supportive friends or family, a counselor, or a lana leader about your feelings is a healthy way to relieve stress. Avoid discussing your feelings with people who make you feel worse.  Write. It may help to write about things that are bothering you. This helps you find out how much stress you feel and what is causing it. When you know this, you can find better ways to cope.  What can you do to prevent stress?  You might try some of these things to help prevent stress:  Manage your time. This helps you find time to do the things you want and need to do.  Get enough sleep. Your body recovers from the stresses of the day while you are sleeping.  Get support. Your family, friends, and community can make a difference in how you experience stress.  Limit your news feed. Avoid or limit time on social media or news that may make you feel stressed.  Do something active. Exercise or activity can help reduce stress. Walking is a great way to get started.  Where can you learn more?  Go to https://www.Swype.net/patiented  Enter N032 in the search box to learn more about \"Learning About Stress.\"  Current as of: October 24, 2024  Content Version: 14.4 2024-2025 [employer].   Care instructions adapted under license by your healthcare professional. If you have questions about a medical condition or this instruction, always ask your healthcare professional. [employer] disclaims any warranty or liability for your use of this information.

## 2025-05-08 NOTE — NURSING NOTE
"Initial /80   Pulse 60   Temp 97.4  F (36.3  C) (Tympanic)   Resp 16   Ht 1.6 m (5' 3\")   Wt 71.7 kg (158 lb)   SpO2 99%   BMI 27.99 kg/m   Estimated body mass index is 27.99 kg/m  as calculated from the following:    Height as of this encounter: 1.6 m (5' 3\").    Weight as of this encounter: 71.7 kg (158 lb). .    "

## 2025-05-08 NOTE — PROGRESS NOTES
"Preventive Care Visit  [facility]  Katy Rodriguez MD, Family Medicine  May 8, 2025      Assessment & Plan     Encounter for Medicare annual wellness exam    Insomnia, unspecified type  Struggling with insomnia since the death of her dog.  Discussed we could try trazodone on a as needed basis. Discussed use and side effects. Follow-up if not improving or if worsening.   - traZODone (DESYREL) 50 MG tablet; Take 1 tablet (50 mg) by mouth at bedtime.    Essential hypertension  Well controlled. Refilled medication.   Labs up-to-date.  - amLODIPine (NORVASC) 10 MG tablet; Take 1 tablet (10 mg) by mouth daily.    Hyperlipidemia LDL goal <130  Well controlled. Refilled medication.   Labs up-to-date.  - atorvastatin (LIPITOR) 40 MG tablet; Take 1 tablet (40 mg) by mouth daily.    HSV (herpes simplex virus) infection  Well controlled. Refilled medication.     - valACYclovir (VALTREX) 1000 mg tablet; 2 tabs at onset of cold sores. Repeat dose after 12 hours.    Patient has been advised of split billing requirements and indicates understanding: Yes        BMI  Estimated body mass index is 27.99 kg/m  as calculated from the following:    Height as of this encounter: 1.6 m (5' 3\").    Weight as of this encounter: 71.7 kg (158 lb).       Counseling  Appropriate preventive services were addressed with this patient via screening, questionnaire, or discussion as appropriate for fall prevention, nutrition, physical activity, Tobacco-use cessation, social engagement, weight loss and cognition.  Checklist reviewing preventive services available has been given to the patient.  Reviewed patient's diet, addressing concerns and/or questions.   She is at risk for lack of exercise and has been provided with information to increase physical activity for the benefit of her well-being.   She is at risk for psychosocial distress and has been provided with information to reduce risk.           Subjective   Janee " is a 84 year old, presenting for the following:  Wellness Visit        5/8/2025    11:18 AM   Additional Questions   Roomed by Giselle REESE CMA           HPI           Advance Care Planning    Document on file is a Health Care Directive or POLST.        5/8/2025   General Health   How would you rate your overall physical health? Good   Feel stress (tense, anxious, or unable to sleep) Very much   (!) STRESS CONCERN      5/8/2025   Nutrition   Diet: Breakfast skipped         5/8/2025   Exercise   Days per week of moderate/strenous exercise 3 days   Average minutes spent exercising at this level 60 min         5/8/2025   Social Factors   Frequency of gathering with friends or relatives Twice a week   Worry food won't last until get money to buy more No   Food not last or not have enough money for food? No   Do you have housing? (Housing is defined as stable permanent housing and does not include staying outside in a car, in a tent, in an abandoned building, in an overnight shelter, or couch-surfing.) Yes   Are you worried about losing your housing? No   Lack of transportation? No   Unable to get utilities (heat,electricity)? No         5/8/2025   Fall Risk   Fallen 2 or more times in the past year? No     No   Trouble with walking or balance? No     No       Proxy-reported    Multiple values from one day are sorted in reverse-chronological order          5/8/2025   Activities of Daily Living- Home Safety   Needs help with the following daily activites None of the above   Safety concerns in the home None of the above         5/8/2025   Dental   Dentist two times every year? Yes         5/8/2025   Hearing Screening   Hearing concerns? None of the above         5/8/2025   Driving Risk Screening   Patient/family members have concerns about driving No         5/8/2025   General Alertness/Fatigue Screening   Have you been more tired than usual lately? No         5/8/2025   Urinary Incontinence Screening   Bothered by leaking  urine in past 6 months No         Today's PHQ-2 Score:       2025    10:52 AM   PHQ-2 (  [employer])   Q1: Little interest or pleasure in doing things 0   Q2: Feeling down, depressed or hopeless 0   PHQ-2 Score 0    Q1: Little interest or pleasure in doing things Not at all   Q2: Feeling down, depressed or hopeless Not at all   PHQ-2 Score 0       Patient-reported           2025   Substance Use   Alcohol more than 3/day or more than 7/wk No   Do you have a current opioid prescription? No   How severe/bad is pain from 1 to 10? 3/10   Do you use any other substances recreationally? No     Social History     Tobacco Use    Smoking status: Former     Current packs/day: 0.00     Types: Cigarettes     Start date: 1962     Quit date: 1982     Years since quittin.2    Smokeless tobacco: Never   Vaping Use    Vaping status: Never Used   Substance Use Topics    Alcohol use: Yes     Comment: occ wine    Drug use: No           2025   LAST FHS-7 RESULTS   1st degree relative breast or ovarian cancer No   Any relative bilateral breast cancer No   Any male have breast cancer No   Any ONE woman have BOTH breast AND ovarian cancer No   Any woman with breast cancer before 50yrs No   2 or more relatives with breast AND/OR ovarian cancer No   2 or more relatives with breast AND/OR bowel cancer No        Mammogram Screening - After age 74- determine frequency with patient based on health status, life expectancy and patient goals              Reviewed and updated as needed this visit by Provider   Tobacco  Allergies  Meds  Problems  Med Hx  Surg Hx  Fam Hx            Past Medical History:   Diagnosis Date    Basal cell carcinoma     Hypertension 2010    Macular degeneration     PONV (postoperative nausea and vomiting)     Pulmonary embolism (H)     Pulmonary embolism, bilateral (H) 2012    Post-surgical at [facility] following total knee replacement     Shingles outbreak 2016     Squamous cell carcinoma of skin, unspecified      Past Surgical History:   Procedure Laterality Date    basal cell cancer of nose  Oct 2012    BREAST SURGERY      breast reduction    CARPAL TUNNEL RELEASE RT/LT      right    COLONOSCOPY N/A 1/10/2023    Procedure: COLONOSCOPY with cold polypectomies;  Surgeon: Sachin Tan DO;  Location: [facility] Main OR    COMBINED REPAIR PTOSIS WITH BLEPHAROPLASTY BILATERAL Bilateral 6/30/2015    Procedure: COMBINED REPAIR PTOSIS WITH BLEPHAROPLASTY BILATERAL;  Surgeon: Ilir Marvin MD;  Location: [facility]    ESOPHAGOSCOPY, GASTROSCOPY, DUODENOSCOPY (EGD), COMBINED N/A 1/10/2023    Procedure: ESOPHAGOGASTRODUODENOSCOPY WITH biopsies and 45 Niuean Savary Dilation;  Surgeon: Sachin Tan DO;  Location: [facility] Main OR    HC REMOVAL GALLBLADDER      LIGATE VEIN VARICOSE, PHLEBECTOMY MULTIPLE STAB, COMBINED Bilateral 8/13/2015    Procedure: COMBINED LIGATE VEIN VARICOSE, PHLEBECTOMY MULTIPLE STAB;  Surgeon: Alphonse Pro MD;  Location: [facility] OR    ORTHOPEDIC SURGERY      bilateral knee    PHACOEMULSIFICATION WITH STANDARD INTRAOCULAR LENS IMPLANT Right 12/21/2017    Procedure: PHACOEMULSIFICATION WITH STANDARD INTRAOCULAR LENS IMPLANT;  Right Cataract Removal with Implant;  Surgeon: Clif Michel MD;  Location: [facility] OR    SURGICAL HISTORY OF -       breast reduction mammoplasty 1990s    SURGICAL HISTORY OF -       bilateral lower extremity vein stripping    SURGICAL HISTORY OF -   2/22/2010    left total knee arthroplasty     Labs reviewed in EPIC  Patient Active Problem List   Diagnosis    Macular degeneration    Spinal stenosis, lumbar region, without neurogenic claudication    Essential hypertension    HYPERLIPIDEMIA LDL GOAL <130    GERD (gastroesophageal reflux disease)    Patent foramen ovale    ASD (atrial septal defect)    Senile nuclear cataract    Acute pulmonary embolism without acute cor pulmonale, unspecified pulmonary embolism type (H)    HSV  (herpes simplex virus) infection    Pain in right upper arm    Acute pain of right shoulder    Traumatic tear of right rotator cuff, unspecified tear extent, subsequent encounter    Neck pain     Past Surgical History:   Procedure Laterality Date    basal cell cancer of nose  Oct 2012    BREAST SURGERY      breast reduction    CARPAL TUNNEL RELEASE RT/LT      right    COLONOSCOPY N/A 1/10/2023    Procedure: COLONOSCOPY with cold polypectomies;  Surgeon: Sachin Tan DO;  Location: [facility] Main OR    COMBINED REPAIR PTOSIS WITH BLEPHAROPLASTY BILATERAL Bilateral 2015    Procedure: COMBINED REPAIR PTOSIS WITH BLEPHAROPLASTY BILATERAL;  Surgeon: Ilir Marvin MD;  Location: [facility]    ESOPHAGOSCOPY, GASTROSCOPY, DUODENOSCOPY (EGD), COMBINED N/A 1/10/2023    Procedure: ESOPHAGOGASTRODUODENOSCOPY WITH biopsies and 45 Nigerien Savary Dilation;  Surgeon: Sachin Tan DO;  Location: [facility] Main OR    HC REMOVAL GALLBLADDER      LIGATE VEIN VARICOSE, PHLEBECTOMY MULTIPLE STAB, COMBINED Bilateral 2015    Procedure: COMBINED LIGATE VEIN VARICOSE, PHLEBECTOMY MULTIPLE STAB;  Surgeon: Alphonse Pro MD;  Location: [facility] OR    ORTHOPEDIC SURGERY      bilateral knee    PHACOEMULSIFICATION WITH STANDARD INTRAOCULAR LENS IMPLANT Right 2017    Procedure: PHACOEMULSIFICATION WITH STANDARD INTRAOCULAR LENS IMPLANT;  Right Cataract Removal with Implant;  Surgeon: Clif Michel MD;  Location: [facility] OR    SURGICAL HISTORY OF -       breast reduction mammoplasty     SURGICAL HISTORY OF -       bilateral lower extremity vein stripping    SURGICAL HISTORY OF -   2010    left total knee arthroplasty       Social History     Tobacco Use    Smoking status: Former     Current packs/day: 0.00     Types: Cigarettes     Start date: 1962     Quit date: 1982     Years since quittin.2    Smokeless tobacco: Never   Substance Use Topics    Alcohol use: Yes     Comment: occ wine     Family  History   Problem Relation Age of Onset    Diabetes Father         last both legs to the disease    Heart Disease Father          age 91         Current Outpatient Medications   Medication Sig Dispense Refill    amLODIPine (NORVASC) 10 MG tablet Take 1 tablet (10 mg) by mouth daily. 90 tablet 4    atorvastatin (LIPITOR) 40 MG tablet Take 1 tablet (40 mg) by mouth daily. 90 tablet 4    Cholecalciferol (VITAMIN D3) 25 MCG (1000 UT) CAPS Take 1 capsule by mouth daily      minoxidil (LONITEN) 2.5 MG tablet 1/4 tabelt daily 60 tablet 3    Multiple Vitamins-Minerals (HAIR SKIN AND NAILS FORMULA PO) Take 2 chew tab by mouth      Multiple Vitamins-Minerals (MACULAR VITAMIN BENEFIT PO) Take 1 tablet by mouth daily Focus MaculaPro      ondansetron (ZOFRAN) 8 MG tablet Take 0.5-1 tablets (4-8 mg) by mouth every 8 hours as needed for nausea. 20 tablet 1    traZODone (DESYREL) 50 MG tablet Take 1 tablet (50 mg) by mouth at bedtime. 30 tablet 1    valACYclovir (VALTREX) 1000 mg tablet 2 tabs at onset of cold sores. Repeat dose after 12 hours. 12 tablet 4     Allergies   Allergen Reactions    Erythromycin Itching    Sulfa Antibiotics Visual Disturbance     Affected eyesight     Current providers sharing in care for this patient include:  Patient Care Team:  Katy Rodriguez MD as PCP - General (Family Practice)  Katy Rodriguez MD as Assigned PCP  Errol Anderson MD as MD (Dermatology)  Chirag Potter MD as Assigned Neuroscience Provider  Errol Anderson MD as Assigned Dermatology Provider    The following health maintenance items are reviewed in Epic and correct as of today:  Health Maintenance   Topic Date Due    RSV VACCINE (1 - 1-dose 75+ series) Never done    COVID-19 Vaccine ( season) 2024    COLORECTAL CANCER SCREENING  01/10/2026    BMP  2026    LIPID  2026    ANNUAL REVIEW OF HM ORDERS  2026    MEDICARE ANNUAL WELLNESS VISIT  2026    FALL RISK  "ASSESSMENT  05/08/2026    DTAP/TDAP/TD IMMUNIZATION (2 - Td or Tdap) 12/13/2027    ADVANCE CARE PLANNING  05/08/2030    DEXA  11/18/2035    PHQ-2 (once per calendar year)  Completed    INFLUENZA VACCINE  Completed    Pneumococcal Vaccine: 50+ Years  Completed    ZOSTER IMMUNIZATION  Completed    HPV IMMUNIZATION  Aged Out    MENINGITIS IMMUNIZATION  Aged Out    LUNG CANCER SCREENING  Discontinued         Review of Systems  Constitutional, neuro, ENT, endocrine, pulmonary, cardiac, gastrointestinal, genitourinary, musculoskeletal, integument and psychiatric systems are negative, except as otherwise noted.     Objective    Exam  /80   Pulse 60   Temp 97.4  F (36.3  C) (Tympanic)   Resp 16   Ht 1.6 m (5' 3\")   Wt 71.7 kg (158 lb)   SpO2 99%   BMI 27.99 kg/m     Estimated body mass index is 27.99 kg/m  as calculated from the following:    Height as of this encounter: 1.6 m (5' 3\").    Weight as of this encounter: 71.7 kg (158 lb).    Physical Exam  GENERAL: alert and no distress  EYES: Eyes grossly normal to inspection, PERRL and conjunctivae and sclerae normal  HENT: normal cephalic/atraumatic and ear canals and TM's normal  NECK: no adenopathy, no asymmetry, masses, or scars  RESP: lungs clear to auscultation - no rales, rhonchi or wheezes  CV: regular rate and rhythm, normal S1 S2, no S3 or S4, no murmur, click or rub, no peripheral edema  ABDOMEN: soft, nontender, no hepatosplenomegaly, no masses and bowel sounds normal  MS: no gross musculoskeletal defects noted, no edema  SKIN: no suspicious lesions or rashes  NEURO: Normal strength and tone, mentation intact and speech normal  PSYCH: mentation appears normal, affect normal/bright         5/8/2025   Mini Cog   Clock Draw Score 2 Normal    2 Normal   3 Item Recall 3 objects recalled    3 objects recalled   Mini Cog Total Score 5    5       Multiple values from one day are sorted in reverse-chronological order              Signed Electronically by: " Katy Rodriguez MD

## 2025-05-27 ENCOUNTER — NURSE TRIAGE (OUTPATIENT)
Dept: FAMILY MEDICINE | Facility: CLINIC | Age: 85
End: 2025-05-27
Payer: COMMERCIAL

## 2025-05-27 NOTE — TELEPHONE ENCOUNTER
Reason for Disposition   Pain radiates into the thigh or further down the leg    Additional Information   Negative: Passed out (e.g., fainted, lost consciousness, blacked out and was not responding)   Negative: Shock suspected (e.g., cold/pale/clammy skin, too weak to stand, low BP, rapid pulse)   Negative: Sounds like a life-threatening emergency to the triager   Negative: Major injury to the back (e.g., MVA, fall > 10 feet or 3 meters, penetrating injury, etc.)   Negative: Pain in the upper back over the ribs (rib cage) that radiates (travels) into the chest   Negative: Pain in the upper back over the ribs (rib cage) and worsened by coughing (or clearly increases with breathing)   Negative: Back pain during pregnancy   Negative: SEVERE back pain of sudden onset and age > 60 years   Negative: SEVERE abdominal pain (e.g., excruciating)   Negative: Abdominal pain and age > 60 years   Negative: Unable to urinate (or only a few drops) and bladder feels very full   Negative: Loss of bladder or bowel control (urine or bowel incontinence; wetting self, leaking stool) of new-onset   Negative: Numbness (loss of sensation) in groin or rectal area   Negative: Pain radiates into groin, scrotum   Negative: Blood in urine (red, pink, or tea-colored)   Negative: Vomiting and pain over lower ribs of back (i.e., flank - kidney area)   Negative: Weakness of a leg or foot (e.g., unable to bear weight, dragging foot)   Negative: Patient sounds very sick or weak to the triager   Negative: Fever > 100.4 F (38.0 C) and flank pain   Negative: Pain or burning with passing urine (urination)   Negative: SEVERE back pain (e.g., excruciating, unable to do any normal activities) and not improved after pain medicine and CARE ADVICE   Negative: Numbness in an arm or hand (i.e., loss of sensation) and upper back pain   Negative: Numbness in a leg or foot (i.e., loss of sensation)   Negative: High-risk adult (e.g., history of cancer, history of  HIV, or history of IV Drug Use)   Negative: Soft tissue infection (e.g., abscess, cellulitis) or other serious infection (e.g., bacteremia) in last 2 weeks   Negative: Painful rash with multiple small blisters grouped together (i.e., dermatomal distribution or 'band' or 'stripe')   Negative: Pain radiates into the thigh or further down the leg, and in both legs    Protocols used: Back Pain-A-[place]    RN scheduled appointment for tomorrow.    Aylin Fonseca RN on 5/27/2025 at 1:05 PM

## 2025-05-27 NOTE — TELEPHONE ENCOUNTER
Patient Returning Call    Reason for call:  Pt requesting for call back from care team for medical advice     Information relayed to patient: N/A    Patient has additional questions:  Yes    What are your questions/concerns:  Pt slept crooked / wrong on their right side last Thursday and wants to receive medical advice, pt is having issues with their right side leg - thinks it's a nerve issue from their back or hip area due to bad posture while sleeping. Pt seeking medical advice     Who does the patient want to speak with:  RN    Is an  needed?:  No      Could we send this information to you in [place] or would you prefer to receive a phone call?:   Patient would prefer a phone call   Okay to leave a detailed message?: Yes at Cell number on file:    Telephone Information:   Mobile [phone]        no

## 2025-05-28 ENCOUNTER — OFFICE VISIT (OUTPATIENT)
Dept: FAMILY MEDICINE | Facility: CLINIC | Age: 85
End: 2025-05-28
Payer: COMMERCIAL

## 2025-05-28 VITALS
RESPIRATION RATE: 18 BRPM | BODY MASS INDEX: 27.75 KG/M2 | HEART RATE: 78 BPM | SYSTOLIC BLOOD PRESSURE: 134 MMHG | TEMPERATURE: 97.8 F | WEIGHT: 156.6 LBS | HEIGHT: 63 IN | OXYGEN SATURATION: 99 % | DIASTOLIC BLOOD PRESSURE: 75 MMHG

## 2025-05-28 DIAGNOSIS — M54.41 ACUTE RIGHT-SIDED LOW BACK PAIN WITH RIGHT-SIDED SCIATICA: Primary | ICD-10-CM

## 2025-05-28 PROCEDURE — 99214 OFFICE O/P EST MOD 30 MIN: CPT | Performed by: NURSE PRACTITIONER

## 2025-05-28 PROCEDURE — 3075F SYST BP GE 130 - 139MM HG: CPT | Performed by: NURSE PRACTITIONER

## 2025-05-28 PROCEDURE — 3078F DIAST BP <80 MM HG: CPT | Performed by: NURSE PRACTITIONER

## 2025-05-28 PROCEDURE — 1125F AMNT PAIN NOTED PAIN PRSNT: CPT | Performed by: NURSE PRACTITIONER

## 2025-05-28 RX ORDER — TIZANIDINE 2 MG/1
2-4 TABLET ORAL 3 TIMES DAILY
Qty: 30 TABLET | Refills: 0 | Status: SHIPPED | OUTPATIENT
Start: 2025-05-28

## 2025-05-28 ASSESSMENT — ENCOUNTER SYMPTOMS: BACK PAIN: 1

## 2025-05-28 ASSESSMENT — PAIN SCALES - GENERAL: PAINLEVEL_OUTOF10: SEVERE PAIN (8)

## 2025-05-28 NOTE — PATIENT INSTRUCTIONS
Naproxen (Aleve) 2 tablets twice daily as needed for pain - take with food.  Can try tizanidine as needed for pain.  PT order is in place, someone will call you to schedule.  Let me know if worsening.

## 2025-05-28 NOTE — PROGRESS NOTES
"  Assessment & Plan     Acute right-sided low back pain with right-sided sciatica  No chronic steroid use, no recent trauma, no IVDU, no history of malignancy, no focal neurological deficit, no saddle anesthesia, incontinence or bowel or bladder. Pain present for about the last week, right lower back radiating to anterior right thigh. Did recommend L spine XR today, however patient declines as she needs to  her grandchild. Low suspicion for fracture, tumor, so ok to defer today, can reconsider at future visit if needed. Tylenol has not been helpful for pain, normal renal function, ok to try naproxen for brief period. She would like to try a muscle relaxer, cautioned can cause drowsiness, gait instability. PT referral placed. She will let me know if not improving as expected.  - Physical Therapy  Referral; Future  - tiZANidine (ZANAFLEX) 2 MG tablet; Take 1-2 tablets (2-4 mg) by mouth 3 times daily.          BMI  Estimated body mass index is 27.52 kg/m  as calculated from the following:    Height as of this encounter: 1.607 m (5' 3.25\").    Weight as of this encounter: 71 kg (156 lb 9.6 oz).         Follow-up  Return in about 1 month (around 6/28/2025) for worsening or continued symptoms.    Pablo Weller is a 84 year old, presenting for the following health issues:  Back Pain (Right hip, thigh, and leg. Comes and goes)      5/28/2025     9:15 AM   Additional Questions   Roomed by AVA Davis CMA   Accompanied by Self     Back Pain     History of Present Illness       Back Pain:  She presents for follow up of back pain. Patient's back pain is a new problem.    Original cause of back pain: other  First noticed back pain: in the last week  Patient feels back pain: constantlyLocation of back pain:  Other  Description of back pain: shooting  Back pain spreads: right thigh    Since patient first noticed back pain, pain is: unchanged  Does back pain interfere with her job:  Yes  On a scale of 1-10 (10 " "being the worst), patient describes pain as:  8  What makes back pain worse: other   Acetaminophen: not helpful  Activity or exercise: helpful  Chiropractor:  Not tried  Cold: not tried  Heat: not tried  Massage: not helpful  Muscle relaxants: not tried  NSAIDS: not helpful  Opioids: not tried  Physical Therapy: not tried  Rest: helpful  Steroid Injection: not tried  Stretching: helpful  Surgery: not tried  TENS unit: not tried  Topical pain relievers: not tried  Other healthcare providers patient is seeing for back pain: None She is missing 2 dose(s) of medications per week.  She is not taking prescribed medications regularly due to remembering to take.        Awoke after sleeping awkwardly in her recliner the morning of 5/22/2025 with significant right sided low back pain radiating to the anterior thigh. Pain has persisted since that time. Denies injury. No paresthesias, saddle anesthesia, loss of bowel or bladder control, LE weakness, UTI symptoms, fever, abd pain. Has tried Tylenol without significant relief, although notes today seems a little better. Does note she has some chronic bilateral lower back pain, but generally not severe.       Review of Systems  Constitutional, neuro, ENT, endocrine, pulmonary, cardiac, gastrointestinal, genitourinary, musculoskeletal, integument and psychiatric systems are negative, except as otherwise noted.      Objective    /75   Pulse 78   Temp 97.8  F (36.6  C) (Oral)   Resp 18   Ht 1.607 m (5' 3.25\")   Wt 71 kg (156 lb 9.6 oz)   SpO2 99%   BMI 27.52 kg/m    Body mass index is 27.52 kg/m .  Physical Exam   General Appearance:  Alert, cooperative, no distress, appears stated age. Afebrile. Appears comfortable sitting in chair. Rises from seated position without difficulty.  Integument: Warm, dry, no rashes or lesions.  HEENT: Atraumatic, normocephalic. Face nontraumatic. Conjunctiva clear, Lids normal.  Neck: Supple, no meningismus. No Cspine tenderness. Neck ROM " intact.  Respiratory: No distress.   Cardiovascular: Regular rate   Abdomen: soft, nontender, non-distended. No masses. No CVAT.  Musculoskeletal: Back: No Lspine or Tspine tenderness or crepitus to palpation. No TTP lumbar paraspinal musculature. Strength testing: hip flexion, extension 5/5 bilaterally, knee flexion/extension 5/5 bilaterally, ankle plantar/dorsiflexion 5/5 bilaterally.  Straight leg raise negative bilaterally. Gait: observed, no antalgia or abnormalities. Steady gait. 2+ bilateral pedal pulses.  Normal toe raise, heel walk. Normal flexion and extension of toes.  Neurologic: Alert and orientated appropriately. No focal deficits. Sensation intact in distal LEs. DTRs: patellar 2+ bilaterally, achilles 2+ bilaterally.  Psych: Normal mood and affect.              Signed Electronically by: NOBLE Hayes CNP

## 2025-05-31 ENCOUNTER — APPOINTMENT (OUTPATIENT)
Dept: GENERAL RADIOLOGY | Facility: CLINIC | Age: 85
End: 2025-05-31
Payer: COMMERCIAL

## 2025-05-31 ENCOUNTER — HOSPITAL ENCOUNTER (EMERGENCY)
Facility: CLINIC | Age: 85
Discharge: HOME | End: 2025-05-31
Payer: COMMERCIAL

## 2025-05-31 VITALS
DIASTOLIC BLOOD PRESSURE: 85 MMHG | HEART RATE: 66 BPM | TEMPERATURE: 97.8 F | SYSTOLIC BLOOD PRESSURE: 163 MMHG | OXYGEN SATURATION: 98 % | RESPIRATION RATE: 16 BRPM

## 2025-05-31 DIAGNOSIS — M54.41 ACUTE RIGHT-SIDED LOW BACK PAIN WITH RIGHT-SIDED SCIATICA: ICD-10-CM

## 2025-05-31 PROCEDURE — 99213 OFFICE O/P EST LOW 20 MIN: CPT

## 2025-05-31 PROCEDURE — G0463 HOSPITAL OUTPT CLINIC VISIT: HCPCS

## 2025-05-31 PROCEDURE — 72100 X-RAY EXAM L-S SPINE 2/3 VWS: CPT

## 2025-05-31 RX ORDER — METHYLPREDNISOLONE 4 MG/1
TABLET ORAL
Qty: 21 TABLET | Refills: 0 | Status: SHIPPED | OUTPATIENT
Start: 2025-05-31

## 2025-05-31 ASSESSMENT — ACTIVITIES OF DAILY LIVING (ADL): ADLS_ACUITY_SCORE: 52

## 2025-05-31 NOTE — ED PROVIDER NOTES
History   No chief complaint on file.    HPI  Marlene Millan is a 84 year old female who presents for evaluation of right-sided low back/thigh/leg pain.  Symptoms initially began about 1 week ago, after she slept with her leg twisted for about 3 hours.  She was seen in primary office 3 days ago and was diagnosed with right-sided low back pain with sciatica, at the time a lumbar spine x-ray was recommended however patient declined at the time.  She was provided with muscle relaxant and recommended to follow-up with physical therapy.  Patient returns today requesting x-ray as she has had consistent pain despite recommendations.  In addition she is taking at 1000 mg of Tylenol every few hours which does seem to help.  She did try tizanidine which she was prescribed however does not feel like this was helpful for her so she stopped.  In addition she feels that the pain is better when she sits upright and when she tries stretching her leg.  She denies bowel or bladder dysfunction, saddle anesthesia, weakness into lower extremities, fever or chills, history of IV drug use, chest pain, shortness of breath or abdominal pain.    Allergies:  Allergies   Allergen Reactions    Erythromycin Itching    Sulfa Antibiotics Visual Disturbance     Affected eyesight       Problem List:    Patient Active Problem List    Diagnosis Date Noted    Neck pain 05/21/2024     Priority: Medium    Traumatic tear of right rotator cuff, unspecified tear extent, subsequent encounter 09/30/2022     Priority: Medium    Pain in right upper arm 08/12/2022     Priority: Medium    Acute pain of right shoulder 08/12/2022     Priority: Medium    HSV (herpes simplex virus) infection 04/13/2022     Priority: Medium    Acute pulmonary embolism without acute cor pulmonale, unspecified pulmonary embolism type (H) 02/20/2022     Priority: Medium     Ct 2/20/2022- The study is positive for multiple bilateral pulmonary emboli. No evidence of right heart  strain.      Senile nuclear cataract 12/19/2017     Priority: Medium    ASD (atrial septal defect) 12/14/2017     Priority: Medium    Patent foramen ovale 02/27/2012     Priority: Medium     Incidental finding on transesophageal echocardiogram      GERD (gastroesophageal reflux disease) 02/25/2011     Priority: Medium     Controlled by diet      HYPERLIPIDEMIA LDL GOAL <130 10/31/2010     Priority: Medium    Essential hypertension 06/07/2010     Priority: Medium    Macular degeneration 07/14/2008     Priority: Medium     Left eye      Spinal stenosis, lumbar region, without neurogenic claudication 07/14/2008     Priority: Medium        Past Medical History:    Past Medical History:   Diagnosis Date    Basal cell carcinoma     Hypertension 06/07/2010    Macular degeneration     PONV (postoperative nausea and vomiting)     Pulmonary embolism (H)     Pulmonary embolism, bilateral (H) 02/19/2012    Shingles outbreak 12/2016    Squamous cell carcinoma of skin, unspecified        Past Surgical History:    Past Surgical History:   Procedure Laterality Date    basal cell cancer of nose  Oct 2012    BREAST SURGERY      breast reduction    CARPAL TUNNEL RELEASE RT/LT      right    COLONOSCOPY N/A 1/10/2023    Procedure: COLONOSCOPY with cold polypectomies;  Surgeon: Sachin Tan DO;  Location: [facility] Main OR    COMBINED REPAIR PTOSIS WITH BLEPHAROPLASTY BILATERAL Bilateral 6/30/2015    Procedure: COMBINED REPAIR PTOSIS WITH BLEPHAROPLASTY BILATERAL;  Surgeon: Ilir Marvin MD;  Location: [facility]    ESOPHAGOSCOPY, GASTROSCOPY, DUODENOSCOPY (EGD), COMBINED N/A 1/10/2023    Procedure: ESOPHAGOGASTRODUODENOSCOPY WITH biopsies and 45 [address] Savary Dilation;  Surgeon: Sachin Tan DO;  Location: [facility] OR    HC REMOVAL GALLBLADDER      LIGATE VEIN VARICOSE, PHLEBECTOMY MULTIPLE STAB, COMBINED Bilateral 8/13/2015    Procedure: COMBINED LIGATE VEIN VARICOSE, PHLEBECTOMY MULTIPLE STAB;  Surgeon: Alphonse Pro  MD Mary;  Location: [facility] OR    ORTHOPEDIC SURGERY      bilateral knee    PHACOEMULSIFICATION WITH STANDARD INTRAOCULAR LENS IMPLANT Right 2017    Procedure: PHACOEMULSIFICATION WITH STANDARD INTRAOCULAR LENS IMPLANT;  Right Cataract Removal with Implant;  Surgeon: Clif Michel MD;  Location: [facility] OR    SURGICAL HISTORY OF -       breast reduction mammoplasty     SURGICAL HISTORY OF -       bilateral lower extremity vein stripping    SURGICAL HISTORY OF -   2010    left total knee arthroplasty       Family History:    Family History   Problem Relation Age of Onset    Diabetes Father         last both legs to the disease    Heart Disease Father          age 91       Social History:  Marital Status:   [5]  Social History     Tobacco Use    Smoking status: Former     Current packs/day: 0.00     Types: Cigarettes     Start date: 1962     Quit date: 1982     Years since quittin.3    Smokeless tobacco: Never   Vaping Use    Vaping status: Never Used   Substance Use Topics    Alcohol use: Yes     Comment: occ wine    Drug use: No        Medications:    methylPREDNISolone (MEDROL DOSEPAK) 4 MG tablet therapy pack  amLODIPine (NORVASC) 10 MG tablet  atorvastatin (LIPITOR) 40 MG tablet  Cholecalciferol (VITAMIN D3) 25 MCG (1000 UT) CAPS  minoxidil (LONITEN) 2.5 MG tablet  Multiple Vitamins-Minerals (HAIR SKIN AND NAILS FORMULA PO)  Multiple Vitamins-Minerals (MACULAR VITAMIN BENEFIT PO)  tiZANidine (ZANAFLEX) 2 MG tablet  traZODone (DESYREL) 50 MG tablet  valACYclovir (VALTREX) 1000 mg tablet          Review of Systems  Pertinent review of systems as documented per HPI above.    Physical Exam   BP: (!) 163/85  Pulse: 66  Temp: 97.8  F (36.6  C)  Resp: 16  SpO2: 98 %      Physical Exam  Vitals and nursing note reviewed.   Constitutional:       General: She is not in acute distress.     Appearance: Normal appearance. She is not ill-appearing, toxic-appearing or diaphoretic.   HENT:       Head: Atraumatic.   Cardiovascular:      Rate and Rhythm: Normal rate.   Pulmonary:      Effort: Pulmonary effort is normal. No respiratory distress.   Musculoskeletal:      Lumbar back: Spasms, tenderness and bony tenderness present. No swelling, edema, deformity or signs of trauma. Normal range of motion. Positive right straight leg raise test. Negative left straight leg raise test.      Comments: No overlying swelling, bruising or signs of trauma to the low back and thighs.  Has tenderness along right sided lumbar paraspinal musculature with minimal tenderness to the lower lumbar spine.  Distal strength, sensation and DTRs are intact and symmetric.   Skin:     General: Skin is warm and dry.      Capillary Refill: Capillary refill takes less than 2 seconds.   Neurological:      General: No focal deficit present.      Mental Status: She is alert and oriented to person, place, and time.      Motor: Motor function is intact.      Gait: Gait is intact.   Psychiatric:         Mood and Affect: Mood normal.         Behavior: Behavior normal.         ED Course       Results for orders placed or performed during the hospital encounter of 05/31/25 (from the past 24 hours)   Lumbar spine XR, 2-3 views    Narrative    EXAM: XR LUMBAR SPINE 2/3 VIEWS  LOCATION: [facility]  DATE: 5/31/2025    INDICATION: Acute low back pain atraumatic  COMPARISON: None.      Impression    IMPRESSION: No compression fracture. Preserved vertebral body heights. S-shaped mild thoracolumbar curvature. Degenerative grade 1 L2-L3, L3-L4 retrolisthesis. Otherwise preserved vertebral alignment. Interspace degeneration at L5-S1. Otherwise   relatively preserved disc spaces for patient age. Suspect calcified uterine fibroid in the pelvis. Otherwise radiographically normal extraspinal structures.       Medications - No data to display    Assessments & Plan (with Medical Decision Making)     I have reviewed the nursing  notes.    I have reviewed the findings, diagnosis, plan and need for follow up with the patient.  84-year-old female who presents for evaluation of right sided low back/thigh/leg pain that began about 1 week ago.  Was seen in primary office and prescribed muscle relaxant due to concern of right-sided low back pain with sciatica.  X-ray of the spine recommended at that time however patient deferred.  Would like 1 today.    On examination patient is well-appearing with vital signs stable aside from hypertension.  To the back there is no overlying swelling, bruising or signs of trauma.  She has tenderness to right sided lumbar paraspinal musculature with minimal tenderness along the lower lumbar spine.  Distal strength, sensation and DTRs are intact and symmetric.  Remainder of exam reassuring as above.  Lumbar spine x-ray was obtained.  This was reviewed and without compression fracture, does show degeneration at L5-S1 along with degenerative grade 1 L2-L3, L3-L4 retrolisthesis.  This is possibly contributing to her back pain and sciatica.  Given she has not had improvement with Tylenol or muscle relaxant, week we will trial Medrol Dosepak.  In addition I recommended that she follow-up with physical therapy for further treatment and we did discuss some exercises she can try in the interim.  Discussed signs that would warrant sooner return to UC/ED.  All questions were answered.  Patient verbalized understanding and agreement with the above plan.    Disclaimer: This note consists of symbols derived from keyboarding, dictation, and/or voice recognition software. As a result, there may be errors in the script that have gone undetected.  Please consider this when interpreting information found in the chart.      New Prescriptions    METHYLPREDNISOLONE (MEDROL DOSEPAK) 4 MG TABLET THERAPY PACK    Follow Package Directions       Final diagnoses:   Acute right-sided low back pain with right-sided sciatica       5/31/2025   M  [facility] EMERGENCY DEPT       Alannah Kapadia PA-C  06/02/25 [id number]

## 2025-05-31 NOTE — DISCHARGE INSTRUCTIONS
- The x-ray of your low spine shows some degeneration between your L5-S1 spine, which can cause the pain that you are currently experiencing, which is called sciatica.  - Start taking Medrol Dosepak, which is a steroid medication that will help decrease the inflammation.  You can take Tylenol with this, make sure not to exceed 4000 mg in a 24-hour period.  -I placed a referral to hopefully get you in sooner with physical therapy.  In the meantime please try to do the exercises that I have attached here to help with pain.  -Please return for reevaluation if your pain gets worse or you develop other symptoms that are concerning to you.

## 2025-06-05 ENCOUNTER — THERAPY VISIT (OUTPATIENT)
Dept: PHYSICAL THERAPY | Facility: CLINIC | Age: 85
End: 2025-06-05
Attending: INTERNAL MEDICINE
Payer: COMMERCIAL

## 2025-06-05 DIAGNOSIS — M54.41 ACUTE RIGHT-SIDED LOW BACK PAIN WITH RIGHT-SIDED SCIATICA: ICD-10-CM

## 2025-06-05 PROCEDURE — 97110 THERAPEUTIC EXERCISES: CPT | Mod: GP

## 2025-06-05 PROCEDURE — 97161 PT EVAL LOW COMPLEX 20 MIN: CPT | Mod: GP

## 2025-06-05 NOTE — PROGRESS NOTES
"PHYSICAL THERAPY EVALUATION  Type of Visit: Evaluation       Fall Risk Screen:  Have you fallen 2 or more times in the past year?: No  Have you fallen and had an injury in the past year?: No  Is patient receiving Physical Therapy Services?: Yes    Subjective     She fell asleep on her L side on the recliner and when she awoke she had shooting pain down her R leg 2.5 weeks ago. Pt reports having 8 sleepless nights in a row. She was taking 1000mg of ibuprofen and it wasn't helping. She went into ER and she was prescribed prednisone which has helped.         Presenting condition or subjective complaint: sciatica  Date of onset: 27    Relevant medical history:     Dates & types of surgery:      Prior diagnostic imaging/testing results: X-ray     Prior therapy history for the same diagnosis, illness or injury: No      Prior Level of Function  Independent    Living Environment  Social support: Alone   Type of home: 1 level   Stairs to enter the home: No       Ramp: No   Stairs inside the home: No       Help at home: None  Equipment owned:       Employment: Not Applicable    Hobbies/Interests:      Patient goals for therapy:      Pain assessment: Pain present  Location: medial R LE to the ankle/Ratin/10     Objective   LUMBAR SPINE EVALUATION  PAIN: Pain Level at Rest: 0/10  Pain Level with Use: 10/10  Pain Location: lumbar spine  Pain Quality: Sharp, Shooting, and radiates  Pain Frequency: intermittent  Pain is Worst: at night  Pain is Exacerbated By: lying down (supine, SLing), sleeping, reach up to open shades  Pain is Relieved By: otc medications, rest, stretch, and crossing legs  Pain Progression: Improved (on prednisone)  POSTURE: Standing Posture: Rounded shoulders, Forward head  GAIT:   normal  BALANCE/PROPRIOCEPTION: Single Leg Stance Eyes Open (seconds): R 20\", L 10\"  ROM:    Left AROM Right AROM   Lumbar Rotation WFL WFL with incr pain   Lumbar Side glide WFL WFL   Lumbar Flexion Fingers to floor decr " pain   Lumbar Extension WFL   PELVIC/SI SCREEN: Delmis (March): neg  STRENGTH: WFL, except R hip IR 3+/5, ER 4-/5, L hip IR 4-/5, ER 4/5 plank hold unable due to incr pain  FLEXIBILITY: WFL  LUMBAR/HIP Special Tests:    Left Right   LUKAS Negative  Negative    FADIR/Labrum/BARBY Negative  Negative    SLR Negative  Negative    Slump Negative  Negative       PELVIS/SI SPECIAL TESTS: WFL  PALPATION: WFL        Assessment & Plan   CLINICAL IMPRESSIONS  Medical Diagnosis: Acute right-sided low back pain with right-sided sciatica    Treatment Diagnosis: R medial LE pain. Signs and sxs suggest radiculopathy.   Impression/Assessment: Patient is a 84 year old female with R medial LE pain complaints.  The following significant findings have been identified: Pain, Decreased strength, Impaired balance, and Impaired posture. These impairments interfere with their ability to perform self care tasks, household chores, household mobility, and community mobility as compared to previous level of function.     Clinical Decision Making (Complexity):  Clinical Presentation: Stable/Uncomplicated  Clinical Presentation Rationale: based on medical and personal factors listed in PT evaluation  Clinical Decision Making (Complexity): Low complexity    PLAN OF CARE  Treatment Interventions:  Interventions: Manual Therapy, Therapeutic Activity, Therapeutic Exercise    Long Term Goals     PT Goal 1  Goal Identifier: 1  Goal Description: Pt will be able to sleep through the night without waking from pain.  Target Date: 07/17/25  PT Goal 2  Goal Identifier: 2  Goal Description: Pt will be able to reach up to open shades without incr pain.  Target Date: 08/07/25  PT Goal 3  Goal Identifier: 3  Goal Description: Pt will be able to hold a plank 15 seconds to demonstrate increased core strength for better support of her LB.  Target Date: 08/28/25  PT Goal 4  Goal Identifier: 4  Goal Description: Pt will be independent with HEP for optimal functional  recovery.  Target Date: 08/28/25      Frequency of Treatment: 1x/week  Duration of Treatment: 12 weeks    Education Assessment:   Learner/Method: Patient;Listening;Demonstration;Pictures/Video;No Barriers to Learning    Risks and benefits of evaluation/treatment have been explained.   Patient/Family/caregiver agrees with Plan of Care.     Evaluation Time:     PT Eval, Low Complexity Minutes (62359): 20       Signing Clinician: NILESH Leger [facility]                                                                                   OUTPATIENT PHYSICAL THERAPY      PLAN OF TREATMENT FOR OUTPATIENT REHABILITATION   Patient's Last Name, First Name, Marlene Quiñones YOB: 1940   Provider's Name   [facility]   Medical Record No.  [id number]     Onset Date: 05/23/25  Start of Care Date: 06/05/25     Medical Diagnosis:  Acute right-sided low back pain with right-sided sciatica      PT Treatment Diagnosis:  R medial LE pain. Signs and sxs suggest radiculopathy. Plan of Treatment  Frequency/Duration: 1x/week/ 12 weeks    Certification date from 06/05/25 to 08/28/25         See note for plan of treatment details and functional goals     Genna Fuentes PT                         I CERTIFY THE NEED FOR THESE SERVICES FURNISHED UNDER        THIS PLAN OF TREATMENT AND WHILE UNDER MY CARE     (Physician attestation of this document indicates review and certification of the therapy plan).              Referring Provider:  Cassidy Gudio    Initial Assessment  See Epic Evaluation- Start of Care Date: 06/05/25

## 2025-06-24 ENCOUNTER — THERAPY VISIT (OUTPATIENT)
Dept: PHYSICAL THERAPY | Facility: CLINIC | Age: 85
End: 2025-06-24
Attending: NURSE PRACTITIONER
Payer: COMMERCIAL

## 2025-06-24 DIAGNOSIS — M54.41 ACUTE RIGHT-SIDED LOW BACK PAIN WITH RIGHT-SIDED SCIATICA: Primary | ICD-10-CM

## 2025-06-24 PROCEDURE — 97110 THERAPEUTIC EXERCISES: CPT | Mod: GP

## 2025-07-03 ENCOUNTER — THERAPY VISIT (OUTPATIENT)
Dept: PHYSICAL THERAPY | Facility: CLINIC | Age: 85
End: 2025-07-03
Attending: NURSE PRACTITIONER
Payer: COMMERCIAL

## 2025-07-03 DIAGNOSIS — M54.41 ACUTE RIGHT-SIDED LOW BACK PAIN WITH RIGHT-SIDED SCIATICA: Primary | ICD-10-CM

## 2025-07-03 PROCEDURE — 97110 THERAPEUTIC EXERCISES: CPT | Mod: GP

## 2025-07-22 ENCOUNTER — TRANSFERRED RECORDS (OUTPATIENT)
Dept: ADMINISTRATIVE | Facility: CLINIC | Age: 85
End: 2025-07-22
Payer: COMMERCIAL

## 2025-07-22 NOTE — PROCEDURES
[facility]   [address], [address]  [place] [address]    Patient Name: Marlene Millan (Penny) Gender: Female  Exam Date: 2025 Visit Number: [id number]  Age: 85 Years 1 Month : 1940  Attending MD: Sachin Tan DO Medical Record #: [id number]  ______________________________________________________________________________________________  Procedure:    Upper GI Endoscopy   Indications:    Dysphagia   Off PPI  Provider:        Sachin Tan DO   Referring MD: Referral Self   Primary MD:      Katy Rodriguez MD  Medications:  Admitting Medication:   0.9% Normal Saline at TKO   Intra Procedure Medications:   Patient received monitored anesthesia care.     Complications: No immediate complications  ______________________________________________________________________________________________  Procedure:   An examination of the heart and lungs was performed within acceptable limits.  The patient was therefore deemed a reasonable candidate for endoscopy and monitored anesthesia care.  The risks, benefits and plan were discussed to the patient and/or patient representative and all questions answered. After obtaining informed consent, the patient received monitored anesthesia care and I passed the scope.   Throughout the procedure the patient's blood pressure, pulse and oxygen saturations were monitored.  The scope was introduced through the mouth and advanced to the second portion of duodenum.         Findings:   Esophagus:    The z-line is 35 centimeters from the incisors.  Top of the gastric folds is 35 centimeters from the incisors.     Benign Stricture. Location - GE junction. Size of opening - 11 mm. No ulceration present. Stricture was traversed.  Dilatation of the Esophagus:  Instrument:  Savary, size:  39 Pakistani, 42 Pakistani, 45 Pakistani.  Impression: The dilation of the esophagus was successful, there was mild resistance with minor bleeding. Relook after dilation to 39 did not  note disruption of the stricture.  Relook after dilating to 45 French did note mild disruption at the GE junction.     Esophagitis. Location - GE junction. Description  - Reflux. LA Classification - Grade B.  Stomach:    The diaphragm hiatus is at 38 centimeters from the incisors.     Medium Hiatal Hernia. Normal mucosa.  *Stomach Comments:  The gastric mucosa was otherwise normal.  Duodenum:    Normal duodenum.    Impression:   Esophageal stricture  Hiatal hernia      Plan:  Recommendation Comments:  Take esomeprazole 40 mg twice a day for 8 weeks.  Then reduce the dose down to esomeprazole 40 mg daily.  You should stay on at least low-dose PPI indefinitely.  Repeat the upper endoscopy as needed for repeat dilation      Orders    Instruction(s)/Education:  Instruction/Education Timeframe Assessment   Hiatal Hernia  K44.9   Hiatal Hernia  K44.9       Electronically Signed                                                             Sachin Tan DO   07/22/2025 9:30 AM     Medications:  Medication Dose Sig Description PRN Status PRN Reason Comments   amlodipine 10 mg tablet 10 mg take 1 tablet by oral route  every day N  taking as directed   atorvastatin 40 mg tablet 40 mg take 1 tablet by oral route  every day N  taking as directed   esomeprazole magnesium 40 mg capsule,delayed release 40 mg take 1 capsule by oral route  2 times every day. N  taking as directed   fluconazole 200 mg tablet 200 mg take 2 tablets by oral route on day 1, followed by 1 tablet per day every day until they are gone. N  taking as directed   minoxidil 2.5 mg tablet 2.5 mg take 2 tablet by oral route 2 times every day N  taking as directed   valacyclovir 1 gram tablet 1,000 mg take 2 tablet by oral route  every 12 hours N  taking as directed     Allergies:  Medication Name Ingredient Reaction Comment   Sulfa SULFA (SULFONAMIDE ANTIBIOTICS) Effected my eye sight    Erythomycin ERYTHROMYCIN BASE Itching      Vital Signs:  Date Time Systolic  Diastolic Height Weight BMI   07/22/2025 9:07  79 63.50 in 156.80 27.30   07/22/2025 1002  68 .80 27.30   07/22/2025 1012  69 .80 27.30     Race:  White  Preferred Language:  English      cc: Katy Rodriguez MD        [facility] [phone]

## 2025-07-24 ENCOUNTER — TRANSFERRED RECORDS (OUTPATIENT)
Dept: HEALTH INFORMATION MANAGEMENT | Facility: CLINIC | Age: 85
End: 2025-07-24
Payer: COMMERCIAL

## 2025-08-20 ENCOUNTER — OFFICE VISIT (OUTPATIENT)
Dept: FAMILY MEDICINE | Facility: CLINIC | Age: 85
End: 2025-08-20
Payer: COMMERCIAL

## 2025-08-20 VITALS
BODY MASS INDEX: 27.82 KG/M2 | OXYGEN SATURATION: 99 % | RESPIRATION RATE: 16 BRPM | HEIGHT: 63 IN | TEMPERATURE: 97.2 F | DIASTOLIC BLOOD PRESSURE: 68 MMHG | SYSTOLIC BLOOD PRESSURE: 128 MMHG | WEIGHT: 157 LBS | HEART RATE: 68 BPM

## 2025-08-20 DIAGNOSIS — I10 ESSENTIAL HYPERTENSION: Primary | ICD-10-CM

## 2025-08-20 DIAGNOSIS — K22.2 ESOPHAGEAL STRICTURE: ICD-10-CM

## 2025-08-20 PROCEDURE — 3074F SYST BP LT 130 MM HG: CPT | Performed by: FAMILY MEDICINE

## 2025-08-20 PROCEDURE — 3078F DIAST BP <80 MM HG: CPT | Performed by: FAMILY MEDICINE

## 2025-08-20 PROCEDURE — 99214 OFFICE O/P EST MOD 30 MIN: CPT | Performed by: FAMILY MEDICINE

## 2025-08-20 PROCEDURE — 1126F AMNT PAIN NOTED NONE PRSNT: CPT | Performed by: FAMILY MEDICINE

## 2025-08-20 RX ORDER — LOSARTAN POTASSIUM 25 MG/1
25 TABLET ORAL DAILY
Qty: 30 TABLET | Refills: 3 | Status: SHIPPED | OUTPATIENT
Start: 2025-08-20

## 2025-08-20 RX ORDER — PANTOPRAZOLE SODIUM 40 MG/1
TABLET, DELAYED RELEASE ORAL
COMMUNITY
Start: 2025-08-04

## 2025-08-20 ASSESSMENT — PAIN SCALES - GENERAL: PAINLEVEL_OUTOF10: NO PAIN (0)

## (undated) DEVICE — ENDO SNARE EXACTO COLD 9MM LOOP 2.4MMX230CM 00711115

## (undated) DEVICE — SOL WATER IRRIG 1000ML BOTTLE 2F7114

## (undated) DEVICE — FORCEP BIOPSY 2.3MM DISP COATED 000388

## (undated) DEVICE — SUCTION MANIFOLD NEPTUNE 2 SYS 1 PORT 702-025-000

## (undated) DEVICE — TUBING SUCTION MEDI-VAC 1/4"X20' N620A - HE

## (undated) RX ORDER — ONDANSETRON 2 MG/ML
INJECTION INTRAMUSCULAR; INTRAVENOUS
Status: DISPENSED
Start: 2023-01-10

## (undated) RX ORDER — FENTANYL CITRATE 50 UG/ML
INJECTION, SOLUTION INTRAMUSCULAR; INTRAVENOUS
Status: DISPENSED
Start: 2017-12-21

## (undated) RX ORDER — PROPOFOL 10 MG/ML
INJECTION, EMULSION INTRAVENOUS
Status: DISPENSED
Start: 2023-01-10

## (undated) RX ORDER — SCOLOPAMINE TRANSDERMAL SYSTEM 1 MG/1
PATCH, EXTENDED RELEASE TRANSDERMAL
Status: DISPENSED
Start: 2017-12-21

## (undated) RX ORDER — ONDANSETRON 2 MG/ML
INJECTION INTRAMUSCULAR; INTRAVENOUS
Status: DISPENSED
Start: 2017-12-21

## (undated) RX ORDER — LIDOCAINE HYDROCHLORIDE 10 MG/ML
INJECTION, SOLUTION EPIDURAL; INFILTRATION; INTRACAUDAL; PERINEURAL
Status: DISPENSED
Start: 2017-12-21

## (undated) RX ORDER — DEXAMETHASONE SODIUM PHOSPHATE 4 MG/ML
INJECTION, SOLUTION INTRA-ARTICULAR; INTRALESIONAL; INTRAMUSCULAR; INTRAVENOUS; SOFT TISSUE
Status: DISPENSED
Start: 2023-01-10

## (undated) RX ORDER — IBUPROFEN 400 MG/1
TABLET, FILM COATED ORAL
Status: DISPENSED
Start: 2017-12-21

## (undated) RX ORDER — DEXAMETHASONE SODIUM PHOSPHATE 4 MG/ML
INJECTION, SOLUTION INTRA-ARTICULAR; INTRALESIONAL; INTRAMUSCULAR; INTRAVENOUS; SOFT TISSUE
Status: DISPENSED
Start: 2017-12-21

## (undated) RX ORDER — LIDOCAINE HYDROCHLORIDE 10 MG/ML
INJECTION, SOLUTION EPIDURAL; INFILTRATION; INTRACAUDAL; PERINEURAL
Status: DISPENSED
Start: 2023-01-10

## (undated) RX ORDER — FLURBIPROFEN SODIUM 0.3 MG/ML
SOLUTION/ DROPS OPHTHALMIC
Status: DISPENSED
Start: 2017-12-21